# Patient Record
Sex: FEMALE | Race: WHITE | NOT HISPANIC OR LATINO | Employment: OTHER | URBAN - METROPOLITAN AREA
[De-identification: names, ages, dates, MRNs, and addresses within clinical notes are randomized per-mention and may not be internally consistent; named-entity substitution may affect disease eponyms.]

---

## 2017-03-28 ENCOUNTER — HOSPITAL ENCOUNTER (OUTPATIENT)
Dept: OTHER | Facility: HOSPITAL | Age: 62
Discharge: HOME | End: 2017-03-28
Attending: FAMILY MEDICINE | Admitting: FAMILY MEDICINE

## 2017-09-18 ENCOUNTER — HOSPITAL ENCOUNTER (OUTPATIENT)
Dept: OTHER | Facility: HOSPITAL | Age: 62
Discharge: HOME | End: 2017-09-18
Attending: FAMILY MEDICINE | Admitting: FAMILY MEDICINE

## 2017-10-09 ENCOUNTER — HOSPITAL ENCOUNTER (OUTPATIENT)
Dept: OTHER | Facility: HOSPITAL | Age: 62
Discharge: HOME | End: 2017-10-09
Attending: FAMILY MEDICINE | Admitting: FAMILY MEDICINE

## 2018-08-23 ENCOUNTER — HOSPITAL ENCOUNTER (OUTPATIENT)
Dept: GENERAL RADIOLOGY | Facility: HOSPITAL | Age: 63
Discharge: HOME | End: 2018-08-23
Attending: FAMILY MEDICINE | Admitting: FAMILY MEDICINE

## 2019-05-20 ENCOUNTER — HOSPITAL ENCOUNTER (OUTPATIENT)
Dept: OTHER | Facility: HOSPITAL | Age: 64
Discharge: HOME | End: 2019-05-20
Attending: FAMILY MEDICINE | Admitting: FAMILY MEDICINE

## 2019-05-20 ENCOUNTER — CONVERSION ENCOUNTER (OUTPATIENT)
Dept: FAMILY MEDICINE CLINIC | Facility: CLINIC | Age: 64
End: 2019-05-20

## 2019-06-12 VITALS
SYSTOLIC BLOOD PRESSURE: 138 MMHG | HEIGHT: 66 IN | OXYGEN SATURATION: 97 % | WEIGHT: 210 LBS | RESPIRATION RATE: 16 BRPM | HEART RATE: 90 BPM | DIASTOLIC BLOOD PRESSURE: 70 MMHG | BODY MASS INDEX: 33.75 KG/M2

## 2019-09-04 RX ORDER — ROSUVASTATIN CALCIUM 5 MG/1
5 TABLET, COATED ORAL DAILY
Qty: 90 TABLET | Refills: 4 | Status: SHIPPED | OUTPATIENT
Start: 2019-09-04 | End: 2020-12-31

## 2019-09-18 ENCOUNTER — TELEPHONE (OUTPATIENT)
Dept: FAMILY MEDICINE CLINIC | Facility: CLINIC | Age: 64
End: 2019-09-18

## 2019-09-18 DIAGNOSIS — Z12.31 SCREENING MAMMOGRAM, ENCOUNTER FOR: Primary | ICD-10-CM

## 2019-10-01 ENCOUNTER — TELEPHONE (OUTPATIENT)
Dept: FAMILY MEDICINE CLINIC | Facility: CLINIC | Age: 64
End: 2019-10-01

## 2019-10-02 ENCOUNTER — TELEPHONE (OUTPATIENT)
Dept: FAMILY MEDICINE CLINIC | Facility: CLINIC | Age: 64
End: 2019-10-02

## 2019-10-04 ENCOUNTER — HOSPITAL ENCOUNTER (OUTPATIENT)
Dept: MAMMOGRAPHY | Facility: HOSPITAL | Age: 64
Discharge: HOME | End: 2019-10-04
Admitting: FAMILY MEDICINE

## 2019-10-04 DIAGNOSIS — Z12.31 SCREENING MAMMOGRAM, ENCOUNTER FOR: ICD-10-CM

## 2019-10-04 PROCEDURE — 77067 SCR MAMMO BI INCL CAD: CPT

## 2019-10-04 PROCEDURE — 77063 BREAST TOMOSYNTHESIS BI: CPT

## 2019-10-14 ENCOUNTER — OFFICE VISIT (OUTPATIENT)
Dept: FAMILY MEDICINE CLINIC | Facility: CLINIC | Age: 64
End: 2019-10-14

## 2019-10-14 VITALS
HEART RATE: 62 BPM | OXYGEN SATURATION: 97 % | SYSTOLIC BLOOD PRESSURE: 136 MMHG | TEMPERATURE: 98.6 F | DIASTOLIC BLOOD PRESSURE: 80 MMHG | RESPIRATION RATE: 16 BRPM | HEIGHT: 65 IN | BODY MASS INDEX: 35.85 KG/M2 | WEIGHT: 215.2 LBS

## 2019-10-14 DIAGNOSIS — Z12.31 VISIT FOR SCREENING MAMMOGRAM: ICD-10-CM

## 2019-10-14 DIAGNOSIS — Z12.11 COLON CANCER SCREENING: ICD-10-CM

## 2019-10-14 DIAGNOSIS — E66.09 CLASS 2 OBESITY DUE TO EXCESS CALORIES WITHOUT SERIOUS COMORBIDITY WITH BODY MASS INDEX (BMI) OF 36.0 TO 36.9 IN ADULT: ICD-10-CM

## 2019-10-14 DIAGNOSIS — Z23 NEED FOR PNEUMOCOCCAL VACCINATION: ICD-10-CM

## 2019-10-14 DIAGNOSIS — Z00.01 ANNUAL VISIT FOR GENERAL ADULT MEDICAL EXAMINATION WITH ABNORMAL FINDINGS: Primary | ICD-10-CM

## 2019-10-14 DIAGNOSIS — E78.2 MIXED HYPERLIPIDEMIA: ICD-10-CM

## 2019-10-14 DIAGNOSIS — Z78.0 ASYMPTOMATIC MENOPAUSAL STATE: ICD-10-CM

## 2019-10-14 DIAGNOSIS — J30.1 CHRONIC ALLERGIC RHINITIS DUE TO POLLEN: ICD-10-CM

## 2019-10-14 DIAGNOSIS — Z23 NEED FOR INFLUENZA VACCINATION: ICD-10-CM

## 2019-10-14 DIAGNOSIS — Z12.4 SCREENING FOR MALIGNANT NEOPLASM OF CERVIX: ICD-10-CM

## 2019-10-14 DIAGNOSIS — Z11.59 NEED FOR HEPATITIS C SCREENING TEST: ICD-10-CM

## 2019-10-14 PROBLEM — E66.3 OVERWEIGHT: Status: ACTIVE | Noted: 2019-10-14

## 2019-10-14 PROBLEM — E66.812 CLASS 2 OBESITY DUE TO EXCESS CALORIES WITHOUT SERIOUS COMORBIDITY IN ADULT: Status: ACTIVE | Noted: 2019-10-14

## 2019-10-14 PROBLEM — N13.30 HYDRONEPHROSIS OF LEFT KIDNEY: Status: ACTIVE | Noted: 2019-10-14

## 2019-10-14 PROBLEM — E78.5 HYPERLIPIDEMIA: Status: ACTIVE | Noted: 2019-10-14

## 2019-10-14 PROBLEM — N13.30 HYDRONEPHROSIS OF LEFT KIDNEY: Status: RESOLVED | Noted: 2019-10-14 | Resolved: 2019-10-14

## 2019-10-14 PROBLEM — Z85.3 HISTORY OF MALIGNANT NEOPLASM OF FEMALE BREAST: Status: ACTIVE | Noted: 2019-10-14

## 2019-10-14 LAB
BILIRUB BLD-MCNC: NEGATIVE MG/DL
CLARITY, POC: CLEAR
COLOR UR: YELLOW
GLUCOSE UR STRIP-MCNC: NEGATIVE MG/DL
KETONES UR QL: NEGATIVE
LEUKOCYTE EST, POC: ABNORMAL
NITRITE UR-MCNC: NEGATIVE MG/ML
PH UR: 6 [PH] (ref 5–8)
PROT UR STRIP-MCNC: NEGATIVE MG/DL
RBC # UR STRIP: ABNORMAL /UL
SP GR UR: 1 (ref 1–1.03)
UROBILINOGEN UR QL: NORMAL

## 2019-10-14 PROCEDURE — 90674 CCIIV4 VAC NO PRSV 0.5 ML IM: CPT | Performed by: FAMILY MEDICINE

## 2019-10-14 PROCEDURE — 81002 URINALYSIS NONAUTO W/O SCOPE: CPT | Performed by: FAMILY MEDICINE

## 2019-10-14 PROCEDURE — 99397 PER PM REEVAL EST PAT 65+ YR: CPT | Performed by: FAMILY MEDICINE

## 2019-10-14 PROCEDURE — 90471 IMMUNIZATION ADMIN: CPT | Performed by: FAMILY MEDICINE

## 2019-10-14 RX ORDER — CETIRIZINE HYDROCHLORIDE 10 MG/1
1 TABLET ORAL DAILY
COMMUNITY
Start: 2019-05-20 | End: 2020-06-05

## 2019-10-14 RX ORDER — EPINEPHRINE 0.3 MG/.3ML
0.3 INJECTION SUBCUTANEOUS AS NEEDED
Refills: 3 | COMMUNITY
Start: 2019-09-19

## 2019-10-14 RX ORDER — FLUTICASONE PROPIONATE 50 MCG
2 SPRAY, SUSPENSION (ML) NASAL DAILY
COMMUNITY
Start: 2019-05-20 | End: 2019-10-14

## 2019-10-14 RX ORDER — ALBUTEROL SULFATE 90 UG/1
2 AEROSOL, METERED RESPIRATORY (INHALATION)
COMMUNITY
Start: 2019-05-20 | End: 2019-12-16

## 2019-10-14 RX ORDER — BUDESONIDE AND FORMOTEROL FUMARATE DIHYDRATE 160; 4.5 UG/1; UG/1
2 AEROSOL RESPIRATORY (INHALATION) 2 TIMES DAILY
Refills: 11 | COMMUNITY
Start: 2019-09-19 | End: 2020-12-22

## 2019-10-14 RX ORDER — MONTELUKAST SODIUM 10 MG/1
1 TABLET ORAL DAILY
COMMUNITY
Start: 2019-05-20 | End: 2020-05-26

## 2019-10-14 NOTE — PROGRESS NOTES
Subjective   Montserrat Cueva is a 64 y.o. female.     Chief Complaint   Patient presents with   • Annual Exam       The patient is here: for coordination of medical care to discuss health maintenance and disease prevention to follow up on multiple medical problems. Overall has: moderate activity with work/home activities, good appetite, feels well with minor complaints, good energy level and is sleeping well. Nutrition: balanced diet. Last tetanus shot was 2012.     Hyperlipidemia   This is a chronic problem. The current episode started more than 1 year ago. The problem is uncontrolled. She has no history of diabetes. Pertinent negatives include no chest pain, leg pain, myalgias or shortness of breath. Current antihyperlipidemic treatment includes statins. The current treatment provides significant improvement of lipids. Risk factors for coronary artery disease include dyslipidemia, obesity and post-menopausal.        I personally reviewed and updated the patient's allergies, medications, problem list, and past medical, surgical, social, and family history.     Allergies:  No Known Allergies    Social History:  Social History     Socioeconomic History   • Marital status:      Spouse name: Not on file   • Number of children: Not on file   • Years of education: Not on file   • Highest education level: Not on file   Tobacco Use   • Smoking status: Never Smoker   • Smokeless tobacco: Never Used   Substance and Sexual Activity   • Alcohol use: No     Frequency: Never   • Drug use: No   • Sexual activity: Defer       Family History:  Family History   Problem Relation Age of Onset   • Heart disease Father    • Stroke Father    • Heart disease Brother    • Kidney disease Brother    • Leukemia Son        Past Medical History :  Patient Active Problem List   Diagnosis   • Chronic allergic rhinitis due to pollen   • History of malignant neoplasm of female breast   • Hyperlipidemia   • Asymptomatic menopausal state   •  Class 2 obesity due to excess calories without serious comorbidity in adult   • Screening for malignant neoplasm of cervix   • Annual visit for general adult medical examination with abnormal findings   • Visit for screening mammogram   • Colon cancer screening       Medication List:    Current Outpatient Medications:   •  albuterol sulfate HFA (PROVENTIL HFA) 108 (90 Base) MCG/ACT inhaler, Inhale 2 puffs Every 4 (Four) Hours., Disp: , Rfl:   •  aspirin 81 MG tablet, Take 1 tablet by mouth Daily., Disp: , Rfl:   •  cetirizine (ZYRTEC ALLERGY) 10 MG tablet, Take 1 tablet by mouth Daily., Disp: , Rfl:   •  EPINEPHrine (EPIPEN) 0.3 MG/0.3ML solution auto-injector injection, Inject 0.3 mL into the appropriate muscle as directed by prescriber As Needed for Allergies., Disp: , Rfl: 3  •  montelukast (SINGULAIR) 10 MG tablet, Take 1 tablet by mouth Daily., Disp: , Rfl:   •  rosuvastatin (CRESTOR) 5 MG tablet, Take 1 tablet by mouth Daily., Disp: 90 tablet, Rfl: 4  •  SYMBICORT 160-4.5 MCG/ACT inhaler, Inhale 2 puffs 2 (Two) Times a Day., Disp: , Rfl: 11    Past Surgical History:  Past Surgical History:   Procedure Laterality Date   • MASTECTOMY MODIFIED RADICAL / SIMPLE / COMPLETE Right    • TOTAL ABDOMINAL HYSTERECTOMY      Comments: Ovaries Remain       Depression Screen:   PHQ-2/PHQ-9 Depression Screening 10/14/2019   Little interest or pleasure in doing things 0   Feeling down, depressed, or hopeless 0   Total Score 0       Fall Risk Screen:  ANTONY Fall Risk Assessment was completed, and patient is at LOW risk for falls.Assessment completed on:10/14/2019    Review Of Systems:  Review of Systems   Constitutional: Negative for activity change, appetite change, fatigue, fever, unexpected weight gain and unexpected weight loss.   HENT: Negative for congestion, ear pain, hearing loss, rhinorrhea, sinus pressure, sore throat, swollen glands, trouble swallowing and voice change.    Eyes: Negative for visual disturbance.  "  Respiratory: Negative for apnea, cough, shortness of breath and wheezing.    Cardiovascular: Negative for chest pain and palpitations.   Gastrointestinal: Negative for abdominal pain, blood in stool, constipation, diarrhea, nausea, vomiting and indigestion.   Endocrine: Negative for cold intolerance, heat intolerance, polydipsia and polyuria.   Genitourinary: Negative for breast discharge, breast lump, breast pain, dysuria, flank pain, frequency, pelvic pain and vaginal bleeding.   Musculoskeletal: Negative for arthralgias, joint swelling and myalgias.   Skin: Negative for rash, skin lesions and bruise.   Allergic/Immunologic: Negative for environmental allergies and immunocompromised state.   Neurological: Negative for dizziness, syncope, weakness, numbness, headache and memory problem.   Hematological: Negative for adenopathy. Does not bruise/bleed easily.   Psychiatric/Behavioral: Negative for suicidal ideas and depressed mood. The patient is not nervous/anxious.        Vital Signs:  Visit Vitals  /80 (BP Location: Left arm, Patient Position: Sitting, Cuff Size: Adult)   Pulse 62   Temp 98.6 °F (37 °C) (Oral)   Resp 16   Ht 165.1 cm (65\")   Wt 97.6 kg (215 lb 3.2 oz)   LMP 01/01/1985   SpO2 97% Comment: Room air   BMI 35.81 kg/m²       Physical Exam:  Physical Exam   Constitutional: She is oriented to person, place, and time. She appears well-developed and well-nourished. No distress.   HENT:   Head: Normocephalic. Hair is normal.   Right Ear: Tympanic membrane and external ear normal.   Left Ear: Tympanic membrane and external ear normal.   Nose: Nose normal. No mucosal edema.   Mouth/Throat: Uvula is midline, oropharynx is clear and moist and mucous membranes are normal.   Eyes: Conjunctivae and EOM are normal. Pupils are equal, round, and reactive to light. Right eye exhibits no discharge. Left eye exhibits no discharge.   Neck: Normal range of motion. Neck supple. No JVD present. No muscular " tenderness present. No thyromegaly present.   Cardiovascular: Normal rate, regular rhythm and normal heart sounds. PMI is not displaced. Exam reveals no gallop and no friction rub.   No murmur heard.  Pulses:       Carotid pulses are 2+ on the right side, and 2+ on the left side.       Femoral pulses are 2+ on the right side, and 2+ on the left side.       Dorsalis pedis pulses are 2+ on the right side, and 2+ on the left side.   Pulmonary/Chest: Effort normal and breath sounds normal. No respiratory distress. She has no decreased breath sounds. She has no wheezes. She has no rhonchi. She has no rales. Left breast exhibits no inverted nipple, no mass, no nipple discharge, no skin change and no tenderness (Her right breast is absent ).   Abdominal: Soft. Bowel sounds are normal. She exhibits no distension, no abdominal bruit and no mass. There is no hepatosplenomegaly. There is no CVA tenderness. Hernia confirmed negative in the right inguinal area and confirmed negative in the left inguinal area.   Musculoskeletal: Normal range of motion. She exhibits no edema, tenderness or deformity.   Lymphadenopathy:     She has no cervical adenopathy.        Right: No inguinal and no supraclavicular adenopathy present.        Left: No inguinal and no supraclavicular adenopathy present.   Neurological: She is alert and oriented to person, place, and time. She has normal strength and normal reflexes. No cranial nerve deficit or sensory deficit. She exhibits normal muscle tone. Coordination normal.   Skin: No ecchymosis, no lesion and no rash noted. No erythema.   Psychiatric: She has a normal mood and affect. Her speech is normal and behavior is normal. Judgment and thought content normal. Cognition and memory are normal. She does not express impulsivity. She expresses no suicidal ideation. She exhibits normal recent memory and normal remote memory.         Assessment and Plan:  Problem List Items Addressed This Visit         Medium    Hyperlipidemia    Overview     Stable on meds.         Current Assessment & Plan     Lipid abnormalities are improving with treatment.  Pharmacotherapy as ordered.  Lipids will be reassessed in 1 year.         Relevant Orders    CBC & Differential (Completed)    Comprehensive Metabolic Panel (Completed)    Lipid Panel With / Chol / HDL Ratio (Completed)    TSH (Completed)       Low    Chronic allergic rhinitis due to pollen    Overview     She has had exacerbation of sx s/p testing, she is soon to begin immunoRx         Class 2 obesity due to excess calories without serious comorbidity in adult    Overview     Diet and exercise encouraged and discussed. .  She has rejoined wt watchers.             Unprioritized    Asymptomatic menopausal state    Overview     declines  dexa scan 02/12/2014. WNL Repeat 2019         Screening for malignant neoplasm of cervix    Overview     S/P [id number] BSO for fibroids.  Pap smear 01/04/2013. WNL         Annual visit for general adult medical examination with abnormal findings - Primary    Relevant Orders    POCT urinalysis dipstick, manual (Completed)    Visit for screening mammogram    Overview     Last mammogram left breast 10/04/2019. normal         Colon cancer screening    Overview     Cologuard 05/16/2016- negative. Repeat 2019           Other Visit Diagnoses     Need for influenza vaccination        Relevant Orders    Flucelvax Quad=>4Years (PFS) (Completed)    Need for pneumococcal vaccination        Relevant Medications    pneumococcal conj. 13-valent (PREVNAR-13) vaccine 0.5 mL (Completed)    Need for hepatitis C screening test        Relevant Orders    Hepatitis C Antibody (Completed)          An After Visit Summary and PPPS were given to the patient.

## 2019-10-15 LAB
ALBUMIN SERPL-MCNC: 4.5 G/DL (ref 3.6–4.8)
ALBUMIN/GLOB SERPL: 1.5 {RATIO} (ref 1.2–2.2)
ALP SERPL-CCNC: 73 IU/L (ref 39–117)
ALT SERPL-CCNC: 21 IU/L (ref 0–32)
AST SERPL-CCNC: 23 IU/L (ref 0–40)
BASOPHILS # BLD AUTO: 0 X10E3/UL (ref 0–0.2)
BASOPHILS NFR BLD AUTO: 1 %
BILIRUB SERPL-MCNC: 0.4 MG/DL (ref 0–1.2)
BUN SERPL-MCNC: 13 MG/DL (ref 8–27)
BUN/CREAT SERPL: 21 (ref 12–28)
CALCIUM SERPL-MCNC: 9.4 MG/DL (ref 8.7–10.3)
CHLORIDE SERPL-SCNC: 102 MMOL/L (ref 96–106)
CHOLEST SERPL-MCNC: 164 MG/DL (ref 100–199)
CHOLEST/HDLC SERPL: 3 RATIO (ref 0–4.4)
CO2 SERPL-SCNC: 26 MMOL/L (ref 20–29)
CREAT SERPL-MCNC: 0.63 MG/DL (ref 0.57–1)
EOSINOPHIL # BLD AUTO: 0.3 X10E3/UL (ref 0–0.4)
EOSINOPHIL NFR BLD AUTO: 5 %
ERYTHROCYTE [DISTWIDTH] IN BLOOD BY AUTOMATED COUNT: 15 % (ref 12.3–15.4)
GLOBULIN SER CALC-MCNC: 3.1 G/DL (ref 1.5–4.5)
GLUCOSE SERPL-MCNC: 83 MG/DL (ref 65–99)
HCT VFR BLD AUTO: 41 % (ref 34–46.6)
HCV AB S/CO SERPL IA: <0.1 S/CO RATIO (ref 0–0.9)
HDLC SERPL-MCNC: 54 MG/DL
HGB BLD-MCNC: 13.7 G/DL (ref 11.1–15.9)
IMM GRANULOCYTES # BLD AUTO: 0 X10E3/UL (ref 0–0.1)
IMM GRANULOCYTES NFR BLD AUTO: 0 %
LDLC SERPL CALC-MCNC: 95 MG/DL (ref 0–99)
LYMPHOCYTES # BLD AUTO: 2.2 X10E3/UL (ref 0.7–3.1)
LYMPHOCYTES NFR BLD AUTO: 34 %
MCH RBC QN AUTO: 29.3 PG (ref 26.6–33)
MCHC RBC AUTO-ENTMCNC: 33.4 G/DL (ref 31.5–35.7)
MCV RBC AUTO: 88 FL (ref 79–97)
MONOCYTES # BLD AUTO: 0.6 X10E3/UL (ref 0.1–0.9)
MONOCYTES NFR BLD AUTO: 10 %
NEUTROPHILS # BLD AUTO: 3.3 X10E3/UL (ref 1.4–7)
NEUTROPHILS NFR BLD AUTO: 50 %
PLATELET # BLD AUTO: 329 X10E3/UL (ref 150–450)
POTASSIUM SERPL-SCNC: 4.3 MMOL/L (ref 3.5–5.2)
PROT SERPL-MCNC: 7.6 G/DL (ref 6–8.5)
RBC # BLD AUTO: 4.67 X10E6/UL (ref 3.77–5.28)
SODIUM SERPL-SCNC: 143 MMOL/L (ref 134–144)
TRIGL SERPL-MCNC: 75 MG/DL (ref 0–149)
TSH SERPL DL<=0.005 MIU/L-ACNC: 0.45 UIU/ML (ref 0.45–4.5)
VLDLC SERPL CALC-MCNC: 15 MG/DL (ref 5–40)
WBC # BLD AUTO: 6.4 X10E3/UL (ref 3.4–10.8)

## 2019-12-06 ENCOUNTER — OFFICE VISIT (OUTPATIENT)
Dept: FAMILY MEDICINE CLINIC | Facility: CLINIC | Age: 64
End: 2019-12-06

## 2019-12-06 VITALS
WEIGHT: 201.2 LBS | HEART RATE: 82 BPM | OXYGEN SATURATION: 99 % | RESPIRATION RATE: 16 BRPM | HEIGHT: 65 IN | TEMPERATURE: 98.6 F | SYSTOLIC BLOOD PRESSURE: 140 MMHG | BODY MASS INDEX: 33.52 KG/M2 | DIASTOLIC BLOOD PRESSURE: 82 MMHG

## 2019-12-06 DIAGNOSIS — E66.3 OVERWEIGHT: ICD-10-CM

## 2019-12-06 DIAGNOSIS — H92.11 DISCHARGE OF RIGHT EAR PRESENT: Primary | ICD-10-CM

## 2019-12-06 DIAGNOSIS — B96.89 ACUTE BACTERIAL SINUSITIS: ICD-10-CM

## 2019-12-06 DIAGNOSIS — J01.90 ACUTE BACTERIAL SINUSITIS: ICD-10-CM

## 2019-12-06 DIAGNOSIS — H72.91 PERFORATION OF RIGHT TYMPANIC MEMBRANE: ICD-10-CM

## 2019-12-06 PROCEDURE — 99213 OFFICE O/P EST LOW 20 MIN: CPT | Performed by: FAMILY MEDICINE

## 2019-12-06 RX ORDER — CEPHALEXIN 500 MG/1
500 CAPSULE ORAL 3 TIMES DAILY
Qty: 30 CAPSULE | Refills: 0 | Status: SHIPPED | OUTPATIENT
Start: 2019-12-06 | End: 2019-12-16 | Stop reason: SINTOL

## 2019-12-06 RX ORDER — OFLOXACIN 3 MG/ML
5 SOLUTION AURICULAR (OTIC) DAILY
Qty: 10 ML | Refills: 0 | Status: SHIPPED | OUTPATIENT
Start: 2019-12-06 | End: 2020-02-11

## 2019-12-06 NOTE — PROGRESS NOTES
Subjective   Montserrat Cueva is a 64 y.o. female.     Chief Complaint   Patient presents with   • Ear Drainage       Ear Drainage    There is pain in the right ear. This is a new problem. The current episode started today. The problem occurs constantly. The problem has been unchanged. There has been no fever. Associated symptoms include ear discharge. Pertinent negatives include no abdominal pain, coughing, headaches, neck pain or sore throat.            I personally reviewed and updated the patient's allergies, medications, problem list, and past medical, surgical, social, and family history.     Family History   Problem Relation Age of Onset   • Heart disease Father    • Stroke Father    • Heart disease Brother    • Kidney disease Brother    • Leukemia Son        Social History     Tobacco Use   • Smoking status: Never Smoker   • Smokeless tobacco: Never Used   Substance Use Topics   • Alcohol use: No     Frequency: Never   • Drug use: No       Past Surgical History:   Procedure Laterality Date   • MASTECTOMY MODIFIED RADICAL / SIMPLE / COMPLETE Right    • TOTAL ABDOMINAL HYSTERECTOMY      Comments: Ovaries Remain       Patient Active Problem List   Diagnosis   • Chronic allergic rhinitis due to pollen   • History of malignant neoplasm of female breast   • Hyperlipidemia   • Asymptomatic menopausal state   • Class 2 obesity due to excess calories without serious comorbidity in adult   • Screening for malignant neoplasm of cervix   • Annual visit for general adult medical examination with abnormal findings   • Visit for screening mammogram   • Colon cancer screening   • Discharge of right ear present   • Perforation of right tympanic membrane         Current Outpatient Medications:   •  aspirin 81 MG tablet, Take 1 tablet by mouth Daily., Disp: , Rfl:   •  cetirizine (ZYRTEC ALLERGY) 10 MG tablet, Take 1 tablet by mouth Daily., Disp: , Rfl:   •  EPINEPHrine (EPIPEN) 0.3 MG/0.3ML solution auto-injector injection,  "Inject 0.3 mL into the appropriate muscle as directed by prescriber As Needed for Allergies., Disp: , Rfl: 3  •  montelukast (SINGULAIR) 10 MG tablet, Take 1 tablet by mouth Daily., Disp: , Rfl:   •  rosuvastatin (CRESTOR) 5 MG tablet, Take 1 tablet by mouth Daily., Disp: 90 tablet, Rfl: 4  •  SYMBICORT 160-4.5 MCG/ACT inhaler, Inhale 2 puffs 2 (Two) Times a Day., Disp: , Rfl: 11  •  ofloxacin (FLOXIN OTIC) 0.3 % otic solution, Administer 5 drops to the right ear Daily., Disp: 10 mL, Rfl: 0         Review of Systems   Constitutional: Negative for chills and diaphoresis.   HENT: Positive for ear discharge. Negative for sore throat.    Eyes: Negative for visual disturbance.   Respiratory: Negative for cough and shortness of breath.    Cardiovascular: Negative for chest pain and palpitations.   Gastrointestinal: Negative for abdominal pain and nausea.   Endocrine: Negative for polydipsia and polyphagia.   Musculoskeletal: Negative for neck pain and neck stiffness.   Skin: Negative for color change and pallor.   Neurological: Negative for seizures and syncope.   Hematological: Negative for adenopathy.       Objective   /82 (BP Location: Left arm, Patient Position: Sitting, Cuff Size: Adult)   Pulse 82   Temp 98.6 °F (37 °C)   Resp 16   Ht 165.1 cm (65\")   Wt 91.3 kg (201 lb 3.2 oz)   LMP 01/01/1985   SpO2 99%   Breastfeeding No   BMI 33.48 kg/m²   Wt Readings from Last 3 Encounters:   12/16/19 89.7 kg (197 lb 12.8 oz)   12/06/19 91.3 kg (201 lb 3.2 oz)   10/14/19 97.6 kg (215 lb 3.2 oz)     Physical Exam   Constitutional: She is oriented to person, place, and time. She appears well-developed and well-nourished.   HENT:   Head: Normocephalic.   Right Ear: Hearing, external ear and ear canal normal. Tympanic membrane is erythematous. A middle ear effusion is present.   Left Ear: Hearing, external ear and ear canal normal. Tympanic membrane is erythematous. A middle ear effusion is present.   Nose: " Congestion present. Right sinus exhibits maxillary sinus tenderness and frontal sinus tenderness. Left sinus exhibits maxillary sinus tenderness and frontal sinus tenderness.   Mouth/Throat: Posterior oropharyngeal erythema present. No tonsillar abscesses. Tonsils are 1+ on the right. Tonsils are 1+ on the left.   Eyes: Pupils are equal, round, and reactive to light. Conjunctivae, EOM and lids are normal.   Neck: No Brudzinski's sign and no Kernig's sign noted.   Cardiovascular: Normal rate, regular rhythm, S1 normal, S2 normal, normal heart sounds and normal pulses. Exam reveals no gallop and no friction rub.   No murmur heard.  Pulmonary/Chest: Effort normal. No accessory muscle usage or stridor. No respiratory distress. She has no decreased breath sounds. She has wheezes in the right upper field, the right middle field, the right lower field, the left upper field, the left middle field and the left lower field. She has rhonchi in the right upper field, the right middle field, the right lower field, the left upper field, the left middle field and the left lower field. She has no rales.   Abdominal: Soft. Normal appearance and bowel sounds are normal. She exhibits no distension and no mass. There is no tenderness. No hernia.   Neurological: She is alert and oriented to person, place, and time. No cranial nerve deficit. Coordination and gait normal.   Skin: Skin is warm and dry. Turgor is normal. She is not diaphoretic. No pallor.         Assessment/Plan         Acute bacterial sinusitis.  Start antibiotics.  Continue antihistamine.  Follow-up recheck  TM perforation.  Small/peripheral.  Start eardrops.  Follow-up recheck.  Call or return if worsening symptoms  Problem List Items Addressed This Visit        Respiratory    RESOLVED: Acute bacterial sinusitis       Nervous and Auditory    Perforation of right tympanic membrane       Other    Discharge of right ear present - Primary      Other Visit Diagnoses      Overweight                  Expected course, medications, and adverse effects discussed.  Call or return if worsening or persistent symptoms.

## 2019-12-16 ENCOUNTER — OFFICE VISIT (OUTPATIENT)
Dept: FAMILY MEDICINE CLINIC | Facility: CLINIC | Age: 64
End: 2019-12-16

## 2019-12-16 VITALS
HEIGHT: 65 IN | SYSTOLIC BLOOD PRESSURE: 130 MMHG | BODY MASS INDEX: 32.96 KG/M2 | TEMPERATURE: 98.1 F | RESPIRATION RATE: 18 BRPM | WEIGHT: 197.8 LBS | DIASTOLIC BLOOD PRESSURE: 80 MMHG | HEART RATE: 72 BPM | OXYGEN SATURATION: 96 %

## 2019-12-16 DIAGNOSIS — E78.2 MIXED HYPERLIPIDEMIA: ICD-10-CM

## 2019-12-16 DIAGNOSIS — H72.91 PERFORATION OF RIGHT TYMPANIC MEMBRANE: ICD-10-CM

## 2019-12-16 DIAGNOSIS — J30.1 CHRONIC ALLERGIC RHINITIS DUE TO POLLEN: ICD-10-CM

## 2019-12-16 DIAGNOSIS — R19.7 DIARRHEA, UNSPECIFIED TYPE: Primary | ICD-10-CM

## 2019-12-16 DIAGNOSIS — E66.09 CLASS 1 OBESITY DUE TO EXCESS CALORIES WITHOUT SERIOUS COMORBIDITY WITH BODY MASS INDEX (BMI) OF 32.0 TO 32.9 IN ADULT: ICD-10-CM

## 2019-12-16 PROBLEM — B96.89 ACUTE BACTERIAL SINUSITIS: Status: RESOLVED | Noted: 2019-12-06 | Resolved: 2019-12-16

## 2019-12-16 PROBLEM — J01.90 ACUTE BACTERIAL SINUSITIS: Status: RESOLVED | Noted: 2019-12-06 | Resolved: 2019-12-16

## 2019-12-16 PROCEDURE — 99214 OFFICE O/P EST MOD 30 MIN: CPT | Performed by: FAMILY MEDICINE

## 2019-12-16 NOTE — PROGRESS NOTES
Subjective   Montserrat Cueva is a 64 y.o. female.     Chief Complaint   Patient presents with   • Diarrhea       Diarrhea    This is a new problem. The current episode started in the past 7 days (Montserrat states the diarrhea started Friday evening. She was prescribed Keflex 500mg 3 times a day x 10 days on 12/6/2019). The problem occurs more than 10 times per day. The problem has been gradually worsening. The stool consistency is described as watery. The patient states that diarrhea awakens her from sleep. Pertinent negatives include no abdominal pain, arthralgias, bloating, coughing, fever, headaches, increased  flatus, myalgias, URI, vomiting or weight loss. Nothing aggravates the symptoms. There are no known risk factors. She has tried increased fluids (imodium) for the symptoms. The treatment provided mild relief.   Hyperlipidemia   This is a chronic problem. The current episode started more than 1 year ago. Recent lipid tests were reviewed and are normal. Exacerbating diseases include obesity. Pertinent negatives include no chest pain, leg pain, myalgias or shortness of breath. Current antihyperlipidemic treatment includes statins. Risk factors for coronary artery disease include obesity, dyslipidemia and post-menopausal.        The following portions of the patient's history were reviewed and updated as appropriate: allergies, current medications, past family history, past medical history, past social history, past surgical history and problem list.    Allergies:  No Known Allergies    Social History:  Social History     Socioeconomic History   • Marital status:      Spouse name: Not on file   • Number of children: Not on file   • Years of education: Not on file   • Highest education level: Not on file   Tobacco Use   • Smoking status: Never Smoker   • Smokeless tobacco: Never Used   Substance and Sexual Activity   • Alcohol use: No     Frequency: Never   • Drug use: No   • Sexual activity: Defer        Family History:  Family History   Problem Relation Age of Onset   • Heart disease Father    • Stroke Father    • Heart disease Brother    • Kidney disease Brother    • Leukemia Son        Past Medical History :  Patient Active Problem List   Diagnosis   • Chronic allergic rhinitis due to pollen   • History of malignant neoplasm of female breast   • Hyperlipidemia   • Asymptomatic menopausal state   • Class 2 obesity due to excess calories without serious comorbidity in adult   • Screening for malignant neoplasm of cervix   • Annual visit for general adult medical examination with abnormal findings   • Visit for screening mammogram   • Colon cancer screening   • Discharge of right ear present   • Perforation of right tympanic membrane       Medication List:    Current Outpatient Medications:   •  aspirin 81 MG tablet, Take 1 tablet by mouth Daily., Disp: , Rfl:   •  cetirizine (ZYRTEC ALLERGY) 10 MG tablet, Take 1 tablet by mouth Daily., Disp: , Rfl:   •  EPINEPHrine (EPIPEN) 0.3 MG/0.3ML solution auto-injector injection, Inject 0.3 mL into the appropriate muscle as directed by prescriber As Needed for Allergies., Disp: , Rfl: 3  •  montelukast (SINGULAIR) 10 MG tablet, Take 1 tablet by mouth Daily., Disp: , Rfl:   •  ofloxacin (FLOXIN OTIC) 0.3 % otic solution, Administer 5 drops to the right ear Daily., Disp: 10 mL, Rfl: 0  •  rosuvastatin (CRESTOR) 5 MG tablet, Take 1 tablet by mouth Daily., Disp: 90 tablet, Rfl: 4  •  SYMBICORT 160-4.5 MCG/ACT inhaler, Inhale 2 puffs 2 (Two) Times a Day., Disp: , Rfl: 11    Past Surgical History:  Past Surgical History:   Procedure Laterality Date   • MASTECTOMY MODIFIED RADICAL / SIMPLE / COMPLETE Right    • TOTAL ABDOMINAL HYSTERECTOMY      Comments: Ovaries Remain       Review of Systems:  Review of Systems   Constitutional: Negative for appetite change, fatigue, fever and unexpected weight loss.   HENT: Positive for congestion (moderate sxs of 5 days duration and  "worsening. ), postnasal drip, sinus pressure and tinnitus. Negative for ear pain ( she was seen last week with suspected TM ruputre, she is much improved  her upper respiratory sxs continue) and sore throat.    Eyes: Negative for visual disturbance.   Respiratory: Negative for cough, shortness of breath and wheezing.    Cardiovascular: Negative for chest pain, palpitations and leg swelling.   Gastrointestinal: Positive for diarrhea. Negative for abdominal pain, bloating, constipation, flatus, nausea and vomiting.   Endocrine: Negative.  Negative for cold intolerance, heat intolerance, polydipsia and polyuria.   Genitourinary: Negative for dysuria, frequency and hematuria.   Musculoskeletal: Negative for arthralgias and myalgias.   Skin: Negative for rash and bruise.   Allergic/Immunologic: Negative for environmental allergies (She is  presently on allergies. ).   Neurological: Negative for dizziness, syncope, weakness and headache.   Hematological: Negative for adenopathy. Does not bruise/bleed easily.   Psychiatric/Behavioral: Negative for depressed mood.       Physical Exam:  Vital Signs:  Visit Vitals  /80 (BP Location: Left arm, Patient Position: Sitting, Cuff Size: Adult)   Pulse 72   Temp 98.1 °F (36.7 °C)   Resp 18   Ht 165.1 cm (65\")   Wt 89.7 kg (197 lb 12.8 oz)   LMP 01/01/1985   SpO2 96% Comment: room air   BMI 32.92 kg/m²       Physical Exam   Constitutional: She is oriented to person, place, and time. She appears well-developed and well-nourished. No distress.   HENT:   Right Ear: Tympanic membrane and external ear normal.   Left Ear: Tympanic membrane and external ear normal.   Mouth/Throat: Oropharynx is clear and moist.   Eyes: Conjunctivae are normal.   Neck: Neck supple. No JVD present. No thyromegaly present.   Cardiovascular: Normal rate, regular rhythm, normal heart sounds and intact distal pulses. Exam reveals no gallop and no friction rub.   No murmur heard.  Pulmonary/Chest: Effort " normal and breath sounds normal. No respiratory distress. She has no wheezes. She has no rales.   Musculoskeletal: She exhibits no edema.   Lymphadenopathy:     She has no cervical adenopathy.   Neurological: She is alert and oriented to person, place, and time. Coordination normal.   Skin: Skin is warm. No rash noted. No erythema.   Psychiatric: She has a normal mood and affect.   Vitals reviewed.      Assessment and Plan:  Problem List Items Addressed This Visit        Medium    Hyperlipidemia    Overview     Stable on meds. Compliant         Current Assessment & Plan     Lipid abnormalities are improving with treatment.  Pharmacotherapy as ordered.  Lipids will be reassessed in 6 months.            Low    Chronic allergic rhinitis due to pollen    Overview     She is on immunRx and improved.   Mild exacerbation, resume meds and follow         Class 2 obesity due to excess calories without serious comorbidity in adult    Overview     Diet and exercise encouraged and discussed. .  She has rejoined wt watchers. She is congratulated on successful wt loss            Unprioritized    Perforation of right tympanic membrane      Other Visit Diagnoses     Diarrhea, unspecified type    -  Primary    negative cologuard 10/2019, sxs are improved and began after antibotic Rx cephalexin. Probiotic an follow.           An After Visit Summary and PPPS were given to the patient.

## 2020-01-23 ENCOUNTER — TELEPHONE (OUTPATIENT)
Dept: FAMILY MEDICINE CLINIC | Facility: CLINIC | Age: 65
End: 2020-01-23

## 2020-01-31 RX ORDER — AMOXICILLIN 500 MG/1
CAPSULE ORAL
Qty: 60 CAPSULE | Refills: 0 | OUTPATIENT
Start: 2020-01-31

## 2020-02-11 ENCOUNTER — OFFICE VISIT (OUTPATIENT)
Dept: FAMILY MEDICINE CLINIC | Facility: CLINIC | Age: 65
End: 2020-02-11

## 2020-02-11 VITALS
BODY MASS INDEX: 32.19 KG/M2 | RESPIRATION RATE: 18 BRPM | SYSTOLIC BLOOD PRESSURE: 138 MMHG | TEMPERATURE: 98.3 F | DIASTOLIC BLOOD PRESSURE: 70 MMHG | OXYGEN SATURATION: 99 % | HEIGHT: 65 IN | WEIGHT: 193.2 LBS | HEART RATE: 72 BPM

## 2020-02-11 DIAGNOSIS — L29.9 PRURITUS: ICD-10-CM

## 2020-02-11 DIAGNOSIS — L72.0 INCLUSION CYST: Primary | ICD-10-CM

## 2020-02-11 PROCEDURE — 11402 EXC TR-EXT B9+MARG 1.1-2 CM: CPT | Performed by: FAMILY MEDICINE

## 2020-02-11 NOTE — PROGRESS NOTES
Subjective   Montserrat Cueva is a 64 y.o. female.     Chief Complaint   Patient presents with   • Skin Lesion       Lesion:   Montserrat Cueva is here today for a lesion. The lesion appeared gradual and has been occurring for years. The course has been increasing in size. The lesion is characterized as red, swollen, evidence of infection. The lesion is located over back. The symptoms have been associated with recent enlargement, recurrent physical trama due to location, risk of rupture, pain, inflammation and infection.        The following portions of the patient's history were reviewed and updated as appropriate: allergies, current medications, past family history, past medical history, past social history, past surgical history and problem list.    Allergies:  No Known Allergies    Social History:  Social History     Socioeconomic History   • Marital status:      Spouse name: Not on file   • Number of children: Not on file   • Years of education: Not on file   • Highest education level: Not on file   Tobacco Use   • Smoking status: Never Smoker   • Smokeless tobacco: Never Used   Substance and Sexual Activity   • Alcohol use: No     Frequency: Never   • Drug use: No   • Sexual activity: Defer       Family History:  Family History   Problem Relation Age of Onset   • Heart disease Father    • Stroke Father    • Heart disease Brother    • Kidney disease Brother    • Leukemia Son        Past Medical History :  Patient Active Problem List   Diagnosis   • Chronic allergic rhinitis due to pollen   • History of malignant neoplasm of female breast   • Hyperlipidemia   • Asymptomatic menopausal state   • Class 2 obesity due to excess calories without serious comorbidity in adult   • Screening for malignant neoplasm of cervix   • Annual visit for general adult medical examination with abnormal findings   • Visit for screening mammogram   • Colon cancer screening   • Discharge of right ear present   • Perforation of  "right tympanic membrane       Medication List:  Outpatient Encounter Medications as of 2/11/2020   Medication Sig Dispense Refill   • aspirin 81 MG tablet Take 1 tablet by mouth Daily.     • cetirizine (ZYRTEC ALLERGY) 10 MG tablet Take 1 tablet by mouth Daily.     • EPINEPHrine (EPIPEN) 0.3 MG/0.3ML solution auto-injector injection Inject 0.3 mL into the appropriate muscle as directed by prescriber As Needed for Allergies.  3   • montelukast (SINGULAIR) 10 MG tablet Take 1 tablet by mouth Daily.     • rosuvastatin (CRESTOR) 5 MG tablet Take 1 tablet by mouth Daily. 90 tablet 4   • SYMBICORT 160-4.5 MCG/ACT inhaler Inhale 2 puffs 2 (Two) Times a Day.  11   • [DISCONTINUED] ofloxacin (FLOXIN OTIC) 0.3 % otic solution Administer 5 drops to the right ear Daily. 10 mL 0     No facility-administered encounter medications on file as of 2/11/2020.        Past Surgical History:  Past Surgical History:   Procedure Laterality Date   • MASTECTOMY MODIFIED RADICAL / SIMPLE / COMPLETE Right    • TOTAL ABDOMINAL HYSTERECTOMY      Comments: Ovaries Remain       Review of Systems:  Review of Systems   Constitutional: Negative for fever.   Respiratory: Negative for shortness of breath.    Cardiovascular: Negative for chest pain.   Skin: Positive for skin lesions (there is a lesion at the mid back in the bra line that has recently increased in size and darkened in color. It is puruititc).       I have reviewed and confirmed the accuracy of the ROS as documented by the MA/LPN/RN Nyla Alcazar MD    Vital Signs:  Visit Vitals  /70 (BP Location: Left arm, Patient Position: Sitting, Cuff Size: Adult)   Pulse 72   Temp 98.3 °F (36.8 °C) (Oral)   Resp 18   Ht 165.1 cm (65\")   Wt 87.6 kg (193 lb 3.2 oz)   LMP 01/01/1985   SpO2 99% Comment: Room air   BMI 32.15 kg/m²       Physical Exam   Cardiovascular: Normal rate, regular rhythm and normal heart sounds. Exam reveals no gallop and no friction rub.   No murmur " heard.  Pulmonary/Chest: Effort normal and breath sounds normal.   Skin:   There is a 6 mm flesh colored lesion with a central back center. The lesion is consistent with an inclusion cyst, inflamed with minimal surrounding erythema.      Excision of lesion   Date/Time: 2/11/2020 2:26 PM  Performed by: Nyla Alcazar MD  Authorized by: Nyla Alcazar MD   Preparation: Patient was prepped and draped in the usual sterile fashion.  Local anesthesia used: yes  Anesthesia: local infiltration    Anesthesia:  Local anesthesia used: yes  Local Anesthetic: lidocaine 2% without epinephrine  Anesthetic total: 3 mL    Sedation:  Patient sedated: no    Comments: The 6 mm lesion in the center of the back at T 12 was prepped with betadine. A 1.5 cm elliptical incision was made and the lesion was removed without event. Bleeding was minimal. The wound was closed with 4-0 nylon, with good results. Specimen was sent for pathology.  Montserrat receive wound care precautions and will follow up for suture removal in 14 days or prn problems.         Assessment and Plan:  Problem List Items Addressed This Visit     None      Visit Diagnoses     Inclusion cyst    -  Primary    Relevant Orders    Reference Histopathology    Pruritus              An After Visit Summary and PPPS were given to the patient.

## 2020-02-14 LAB
DX ICD CODE: NORMAL
PATH REPORT.FINAL DX SPEC: NORMAL
PATH REPORT.GROSS SPEC: NORMAL
PATH REPORT.SITE OF ORIGIN SPEC: NORMAL
PATHOLOGIST NAME: NORMAL
PAYMENT PROCEDURE: NORMAL

## 2020-02-24 ENCOUNTER — OFFICE VISIT (OUTPATIENT)
Dept: FAMILY MEDICINE CLINIC | Facility: CLINIC | Age: 65
End: 2020-02-24

## 2020-02-24 VITALS
WEIGHT: 191 LBS | RESPIRATION RATE: 18 BRPM | OXYGEN SATURATION: 98 % | BODY MASS INDEX: 31.82 KG/M2 | TEMPERATURE: 98.4 F | HEART RATE: 72 BPM | HEIGHT: 65 IN | SYSTOLIC BLOOD PRESSURE: 122 MMHG | DIASTOLIC BLOOD PRESSURE: 80 MMHG

## 2020-02-24 DIAGNOSIS — Z48.02 VISIT FOR SUTURE REMOVAL: Primary | ICD-10-CM

## 2020-02-24 PROCEDURE — 99024 POSTOP FOLLOW-UP VISIT: CPT | Performed by: FAMILY MEDICINE

## 2020-02-24 NOTE — PROGRESS NOTES
Subjective   Montserrat Cueva is a 64 y.o. female.     Chief Complaint   Patient presents with   • Suture / Staple Removal       Suture / Staple Removal   The sutures were placed 11 to 14 days ago (02/11/2020. Pathology shows epidermoid cyst.). She tried antibiotic ointment use and regular soap and water washings since the wound repair. The treatment provided no relief. There has been no drainage from the wound. There is no redness present. There is no swelling present. There is no pain present.        The following portions of the patient's history were reviewed and updated as appropriate: allergies, current medications, past family history, past medical history, past social history, past surgical history and problem list.    Allergies:  No Known Allergies    Social History:  Social History     Socioeconomic History   • Marital status:      Spouse name: Not on file   • Number of children: Not on file   • Years of education: Not on file   • Highest education level: Not on file   Tobacco Use   • Smoking status: Never Smoker   • Smokeless tobacco: Never Used   Substance and Sexual Activity   • Alcohol use: No     Frequency: Never   • Drug use: No   • Sexual activity: Defer       Family History:  Family History   Problem Relation Age of Onset   • Heart disease Father    • Stroke Father    • Heart disease Brother    • Kidney disease Brother    • Leukemia Son        Past Medical History :  Patient Active Problem List   Diagnosis   • Chronic allergic rhinitis due to pollen   • History of malignant neoplasm of female breast   • Hyperlipidemia   • Asymptomatic menopausal state   • Class 2 obesity due to excess calories without serious comorbidity in adult   • Screening for malignant neoplasm of cervix   • Annual visit for general adult medical examination with abnormal findings   • Visit for screening mammogram   • Colon cancer screening   • Discharge of right ear present   • Perforation of right tympanic membrane  "      Medication List:  Outpatient Encounter Medications as of 2/24/2020   Medication Sig Dispense Refill   • aspirin 81 MG tablet Take 1 tablet by mouth Daily.     • cetirizine (ZYRTEC ALLERGY) 10 MG tablet Take 1 tablet by mouth Daily.     • EPINEPHrine (EPIPEN) 0.3 MG/0.3ML solution auto-injector injection Inject 0.3 mL into the appropriate muscle as directed by prescriber As Needed for Allergies.  3   • montelukast (SINGULAIR) 10 MG tablet Take 1 tablet by mouth Daily.     • rosuvastatin (CRESTOR) 5 MG tablet Take 1 tablet by mouth Daily. 90 tablet 4   • SYMBICORT 160-4.5 MCG/ACT inhaler Inhale 2 puffs 2 (Two) Times a Day.  11     No facility-administered encounter medications on file as of 2/24/2020.        Past Surgical History:  Past Surgical History:   Procedure Laterality Date   • MASTECTOMY MODIFIED RADICAL / SIMPLE / COMPLETE Right    • TOTAL ABDOMINAL HYSTERECTOMY      Comments: Ovaries Remain       Review of Systems:  Review of Systems   Skin:        She reports that her surgical wound is well healed.       I have reviewed and confirmed the accuracy of the ROS as documented by the MA/LPN/RN Nyla Alcazar MD    Vital Signs:  Visit Vitals  /80 (BP Location: Right arm, Patient Position: Sitting, Cuff Size: Adult)   Pulse 72   Temp 98.4 °F (36.9 °C) (Oral)   Resp 18   Ht 165.1 cm (65\")   Wt 86.6 kg (191 lb)   LMP 01/01/1985   SpO2 98% Comment: Room air   BMI 31.78 kg/m²       Physical Exam   Skin:   The surgical wound in the right mid back is well healed. The on e suture was removed without difficulty.        Assessment and Plan:  Problem List Items Addressed This Visit     None      Visit Diagnoses     Visit for suture removal    -  Primary    Wound well healed suture removed. Continue wound care and notify us prn problems.           An After Visit Summary and PPPS were given to the patient.     "

## 2020-05-26 RX ORDER — MONTELUKAST SODIUM 10 MG/1
10 TABLET ORAL NIGHTLY
Qty: 90 TABLET | Refills: 3 | Status: SHIPPED | OUTPATIENT
Start: 2020-05-26 | End: 2021-05-17

## 2020-06-05 ENCOUNTER — OFFICE VISIT (OUTPATIENT)
Dept: FAMILY MEDICINE CLINIC | Facility: CLINIC | Age: 65
End: 2020-06-05

## 2020-06-05 VITALS
BODY MASS INDEX: 32.59 KG/M2 | HEART RATE: 86 BPM | WEIGHT: 195.6 LBS | TEMPERATURE: 98 F | HEIGHT: 65 IN | OXYGEN SATURATION: 97 % | DIASTOLIC BLOOD PRESSURE: 82 MMHG | RESPIRATION RATE: 18 BRPM | SYSTOLIC BLOOD PRESSURE: 132 MMHG

## 2020-06-05 DIAGNOSIS — L23.7 ALLERGIC CONTACT DERMATITIS DUE TO PLANTS, EXCEPT FOOD: Primary | ICD-10-CM

## 2020-06-05 DIAGNOSIS — J30.1 CHRONIC ALLERGIC RHINITIS DUE TO POLLEN: ICD-10-CM

## 2020-06-05 PROCEDURE — 99213 OFFICE O/P EST LOW 20 MIN: CPT | Performed by: FAMILY MEDICINE

## 2020-06-05 RX ORDER — FEXOFENADINE HCL 180 MG/1
180 TABLET ORAL DAILY
COMMUNITY
End: 2020-12-22

## 2020-06-05 RX ORDER — TRIAMCINOLONE ACETONIDE 1 MG/G
CREAM TOPICAL 2 TIMES DAILY
Qty: 80 G | Refills: 3 | Status: SHIPPED | OUTPATIENT
Start: 2020-06-05 | End: 2020-12-22

## 2020-06-05 RX ORDER — PREDNISONE 10 MG/1
10 TABLET ORAL TAKE AS DIRECTED
Qty: 35 TABLET | Refills: 0 | Status: SHIPPED | OUTPATIENT
Start: 2020-06-05 | End: 2020-06-20

## 2020-06-05 NOTE — PROGRESS NOTES
Subjective   Montserrat Cueva is a 64 y.o. female.     Chief Complaint   Patient presents with   • Rash       She is followed by allergy with [facility]. She reports slight increase in wheezing and cough. Mild nasal congestion. She is on maximum allergy meds.     Rash   This is a new problem. The current episode started in the past 7 days. The affected locations include the neck. The rash is characterized by redness and itchiness. She was exposed to nothing. Associated symptoms include congestion and coughing. Pertinent negatives include no eye pain, fatigue, fever, shortness of breath or sore throat. Past treatments include topical steroids. The treatment provided no relief.        The following portions of the patient's history were reviewed and updated as appropriate: allergies, current medications, past family history, past medical history, past social history, past surgical history and problem list.    Allergies:  No Known Allergies    Social History:  Social History     Socioeconomic History   • Marital status:      Spouse name: Not on file   • Number of children: Not on file   • Years of education: Not on file   • Highest education level: Not on file   Tobacco Use   • Smoking status: Never Smoker   • Smokeless tobacco: Never Used   Substance and Sexual Activity   • Alcohol use: No     Frequency: Never   • Drug use: No   • Sexual activity: Defer       Family History:  Family History   Problem Relation Age of Onset   • Heart disease Father    • Stroke Father    • Heart disease Brother    • Kidney disease Brother    • Leukemia Son        Past Medical History :  Patient Active Problem List   Diagnosis   • Chronic allergic rhinitis due to pollen   • History of malignant neoplasm of female breast   • Hyperlipidemia   • Asymptomatic menopausal state   • Class 2 obesity due to excess calories without serious comorbidity in adult   • Screening for malignant neoplasm of cervix   • Annual visit for general adult  medical examination with abnormal findings   • Visit for screening mammogram   • Colon cancer screening   • Discharge of right ear present   • Perforation of right tympanic membrane       Medication List:  Outpatient Encounter Medications as of 6/5/2020   Medication Sig Dispense Refill   • aspirin 81 MG tablet Take 1 tablet by mouth Daily.     • EPINEPHrine (EPIPEN) 0.3 MG/0.3ML solution auto-injector injection Inject 0.3 mL into the appropriate muscle as directed by prescriber As Needed for Allergies.  3   • fexofenadine (ALLEGRA) 180 MG tablet Take 180 mg by mouth Daily.     • montelukast (SINGULAIR) 10 MG tablet Take 1 tablet by mouth Every Night. 90 tablet 3   • rosuvastatin (CRESTOR) 5 MG tablet Take 1 tablet by mouth Daily. 90 tablet 4   • SYMBICORT 160-4.5 MCG/ACT inhaler Inhale 2 puffs 2 (Two) Times a Day.  11   • predniSONE (DELTASONE) 10 MG tablet Take 1 tablet by mouth Take As Directed for 15 days. 5 tab x 1day, 4 tab x 2 day, 3 tab x 3 day, 2 tab x 4 day, 1 tab x 5 day 35 tablet 0   • triamcinolone (KENALOG) 0.1 % cream Apply  topically to the appropriate area as directed 2 (Two) Times a Day. 80 g 3   • [DISCONTINUED] cetirizine (ZYRTEC ALLERGY) 10 MG tablet Take 1 tablet by mouth Daily.       No facility-administered encounter medications on file as of 6/5/2020.        Past Surgical History:  Past Surgical History:   Procedure Laterality Date   • MASTECTOMY MODIFIED RADICAL / SIMPLE / COMPLETE Right    • TOTAL ABDOMINAL HYSTERECTOMY      Comments: Ovaries Remain       Review of Systems:  Review of Systems   Constitutional: Negative for fatigue, fever, unexpected weight gain and unexpected weight loss.   HENT: Positive for congestion. Negative for sore throat.    Eyes: Negative for pain.   Respiratory: Positive for cough and wheezing (mild intermittent at night only. ). Negative for shortness of breath.    Gastrointestinal: Negative for abdominal pain and nausea.   Endocrine: Negative for cold  "intolerance, heat intolerance, polydipsia and polyuria.   Musculoskeletal: Negative for arthralgias and myalgias.   Skin: Positive for rash.   Neurological: Negative for weakness, numbness and headache.   Hematological: Negative for adenopathy. Does not bruise/bleed easily.       I have reviewed and confirmed the accuracy of the ROS as documented by the MA/LPN/RN Nyla Alcazar MD    Vital Signs:  Visit Vitals  /82 (BP Location: Left arm, Patient Position: Sitting, Cuff Size: Large Adult)   Pulse 86   Temp 98 °F (36.7 °C) (Oral)   Resp 18   Ht 165.1 cm (65\")   Wt 88.7 kg (195 lb 9.6 oz)   LMP 01/01/1985   SpO2 97% Comment: Room air   BMI 32.55 kg/m²       Physical Exam   Constitutional: She is oriented to person, place, and time. She appears well-developed and well-nourished. No distress.   Eyes: Conjunctivae are normal.   Neck: Neck supple. No JVD present. No thyromegaly present.   Cardiovascular: Normal rate, regular rhythm, normal heart sounds and intact distal pulses. Exam reveals no gallop and no friction rub.   No murmur heard.  Pulmonary/Chest: Effort normal and breath sounds normal. No respiratory distress. She has no wheezes. She has no rales.   Musculoskeletal: She exhibits no edema.   Lymphadenopathy:     She has no cervical adenopathy.   Neurological: She is alert and oriented to person, place, and time. Coordination normal.   Skin: Skin is warm. Rash noted. No erythema.   There is a vesicular rash over the left lateral neck.    Psychiatric: She has a normal mood and affect.   Vitals reviewed.      Assessment and Plan:  Problem List Items Addressed This Visit        Low    Chronic allergic rhinitis due to pollen    Overview     She is on [facility] and improved.   Mild exacerbation, resume meds and follow         Relevant Medications    predniSONE (DELTASONE) 10 MG tablet      Other Visit Diagnoses     Allergic contact dermatitis due to plants, except food    -  Primary    Relevant Medications    " predniSONE (DELTASONE) 10 MG tablet    triamcinolone (KENALOG) 0.1 % cream          An After Visit Summary and PPPS were given to the patient.

## 2020-08-13 DIAGNOSIS — J30.1 CHRONIC ALLERGIC RHINITIS DUE TO POLLEN: Primary | ICD-10-CM

## 2020-08-13 RX ORDER — FLUTICASONE PROPIONATE 50 MCG
2 SPRAY, SUSPENSION (ML) NASAL DAILY
Qty: 1 BOTTLE | Refills: 12 | Status: SHIPPED | OUTPATIENT
Start: 2020-08-13 | End: 2020-12-22

## 2020-10-12 DIAGNOSIS — Z12.31 VISIT FOR SCREENING MAMMOGRAM: Primary | ICD-10-CM

## 2020-10-30 ENCOUNTER — HOSPITAL ENCOUNTER (OUTPATIENT)
Dept: MAMMOGRAPHY | Facility: HOSPITAL | Age: 65
Discharge: HOME | End: 2020-10-30
Admitting: FAMILY MEDICINE

## 2020-10-30 DIAGNOSIS — Z12.31 VISIT FOR SCREENING MAMMOGRAM: ICD-10-CM

## 2020-10-30 PROCEDURE — 77067 SCR MAMMO BI INCL CAD: CPT

## 2020-10-30 PROCEDURE — 77063 BREAST TOMOSYNTHESIS BI: CPT

## 2020-11-03 ENCOUNTER — OFFICE VISIT (OUTPATIENT)
Dept: FAMILY MEDICINE CLINIC | Facility: CLINIC | Age: 65
End: 2020-11-03

## 2020-11-03 VITALS
TEMPERATURE: 98.3 F | RESPIRATION RATE: 18 BRPM | OXYGEN SATURATION: 97 % | BODY MASS INDEX: 34.32 KG/M2 | WEIGHT: 206 LBS | HEART RATE: 86 BPM | DIASTOLIC BLOOD PRESSURE: 76 MMHG | HEIGHT: 65 IN | SYSTOLIC BLOOD PRESSURE: 128 MMHG

## 2020-11-03 DIAGNOSIS — Z78.0 ASYMPTOMATIC MENOPAUSAL STATE: ICD-10-CM

## 2020-11-03 DIAGNOSIS — Z00.00 WELCOME TO MEDICARE PREVENTIVE VISIT: Primary | ICD-10-CM

## 2020-11-03 DIAGNOSIS — Z12.31 ENCOUNTER FOR SCREENING MAMMOGRAM FOR MALIGNANT NEOPLASM OF BREAST: ICD-10-CM

## 2020-11-03 DIAGNOSIS — E78.2 MIXED HYPERLIPIDEMIA: ICD-10-CM

## 2020-11-03 DIAGNOSIS — E66.09 CLASS 1 OBESITY DUE TO EXCESS CALORIES WITH SERIOUS COMORBIDITY AND BODY MASS INDEX (BMI) OF 34.0 TO 34.9 IN ADULT: ICD-10-CM

## 2020-11-03 DIAGNOSIS — J30.1 CHRONIC ALLERGIC RHINITIS DUE TO POLLEN: ICD-10-CM

## 2020-11-03 DIAGNOSIS — Z12.4 SCREENING FOR MALIGNANT NEOPLASM OF CERVIX: ICD-10-CM

## 2020-11-03 DIAGNOSIS — Z12.11 COLON CANCER SCREENING: ICD-10-CM

## 2020-11-03 PROBLEM — E66.811 CLASS 1 OBESITY DUE TO EXCESS CALORIES WITH SERIOUS COMORBIDITY AND BODY MASS INDEX (BMI) OF 34.0 TO 34.9 IN ADULT: Status: ACTIVE | Noted: 2019-10-14

## 2020-11-03 LAB
BILIRUB BLD-MCNC: NEGATIVE MG/DL
CLARITY, POC: CLEAR
COLOR UR: YELLOW
GLUCOSE UR STRIP-MCNC: NEGATIVE MG/DL
KETONES UR QL: NEGATIVE
LEUKOCYTE EST, POC: ABNORMAL
NITRITE UR-MCNC: NEGATIVE MG/ML
PH UR: 5 [PH] (ref 5–8)
PROT UR STRIP-MCNC: NEGATIVE MG/DL
RBC # UR STRIP: NEGATIVE /UL
SP GR UR: 1.02 (ref 1–1.03)
UROBILINOGEN UR QL: NORMAL

## 2020-11-03 PROCEDURE — 99213 OFFICE O/P EST LOW 20 MIN: CPT | Performed by: FAMILY MEDICINE

## 2020-11-03 PROCEDURE — 81002 URINALYSIS NONAUTO W/O SCOPE: CPT | Performed by: FAMILY MEDICINE

## 2020-11-03 PROCEDURE — G0402 INITIAL PREVENTIVE EXAM: HCPCS | Performed by: FAMILY MEDICINE

## 2020-11-03 NOTE — PROGRESS NOTES
The ABCs of the Annual Wellness Visit  Welcome to Medicare Wellness Visit    Chief Complaint   Patient presents with   • Welcome To Medicare   • Hyperlipidemia       Subjective   History of Present Illness:  Montserrat Cueva is a 65 y.o. female who presents for a [place] [facility] [employer] Wellness Visit. The patient is here: for coordination of medical care to discuss health maintenance and disease prevention. Overall has: minimal activity with work/home activities, exercises 1 time per week, good appetite, feels well with no complaints, good energy level and is sleeping well. Nutrition: balanced diet. Last tetanus shot was 2012.    Hyperlipidemia  This is a chronic problem. The current episode started more than 1 year ago. The problem is controlled. Recent lipid tests were reviewed and are normal. Exacerbating diseases include obesity. She has no history of diabetes. Pertinent negatives include no chest pain, myalgias or shortness of breath. Current antihyperlipidemic treatment includes statins. The current treatment provides significant improvement of lipids. Risk factors for coronary artery disease include family history, dyslipidemia, post-menopausal and obesity.       HEALTH RISK ASSESSMENT    Recent Hospitalizations:  No hospitalization(s) within the last year.    Current Medical Providers:  Patient Care Team:  Nyla Alcazar MD as PCP - General  OttonielNyla MD as PCP - Family Medicine    Smoking Status:  Social History     Tobacco Use   Smoking Status Never Smoker   Smokeless Tobacco Never Used       Alcohol Consumption:  Social History     Substance and Sexual Activity   Alcohol Use No   • Frequency: Never       Depression Screen:   PHQ-2/PHQ-9 Depression Screening 11/3/2020   Little interest or pleasure in doing things 0   Feeling down, depressed, or hopeless 0   Trouble falling or staying asleep, or sleeping too much 0   Feeling tired or having little energy 0   Poor appetite or overeating 0   Feeling  bad about yourself - or that you are a failure or have let yourself or your family down 0   Trouble concentrating on things, such as reading the newspaper or watching television 0   Moving or speaking so slowly that other people could have noticed. Or the opposite - being so fidgety or restless that you have been moving around a lot more than usual 0   Thoughts that you would be better off dead, or of hurting yourself in some way 0   Total Score 0   If you checked off any problems, how difficult have these problems made it for you to do your work, take care of things at home, or get along with other people? Not difficult at all     I spent more than 16 minutes asking patient questions, counseling and documenting in the chart    Fall Risk Screen:  ANTONY Fall Risk Assessment was completed, and patient is at LOW risk for falls.Assessment completed on:11/3/2020    Health Habits and Functional and Cognitive Screening:  Functional & Cognitive Status 11/3/2020   Do you have difficulty preparing food and eating? No   Do you have difficulty bathing yourself, getting dressed or grooming yourself? No   Do you have difficulty using the toilet? No   Do you have difficulty moving around from place to place? No   Do you have trouble with steps or getting out of a bed or a chair? No   Current Diet Well Balanced Diet   Dental Exam Up to date        Dental Exam Comment [facility]   Eye Exam Not up to date        Eye Exam Comment [facility]   Exercise (times per week) 1 times per week   Current Exercises Include No Regular Exercise   Do you need help using the phone?  No   Are you deaf or do you have serious difficulty hearing?  No   Do you need help with transportation? No   Do you need help shopping? No   Do you need help preparing meals?  No   Do you need help with housework?  No   Do you need help with laundry? No   Do you need help taking your medications? No   Do you need help managing money? No   Do you ever drive or ride in  a car without wearing a seat belt? No   Have you felt unusual stress, anger or loneliness in the last month? No   Who do you live with? Spouse   If you need help, do you have trouble finding someone available to you? No   Have you been bothered in the last four weeks by sexual problems? No   Do you have difficulty concentrating, remembering or making decisions? No       Does the patient have evidence of cognitive impairment? No    Asprin use counseling:Taking ASA appropriately as indicated    Visual Acuity:   Visual Acuity Screening    Right eye Left eye Both eyes   Without correction: 20/25 20/50 20/25   With correction:          Recent Lab Results:        Age-appropriate Screening Schedule:  Refer to the list below for future screening recommendations based on patient's age, sex and/or medical conditions. Orders for these recommended tests are listed in the plan section. The patient has been provided with a written plan.    Health Maintenance   Topic Date Due   • ZOSTER VACCINE (2 of 3) 01/13/2016   • INFLUENZA VACCINE  08/01/2020   • LIPID PANEL  10/14/2020   • TDAP/TD VACCINES (2 - Td) 04/12/2022   • MAMMOGRAM  10/30/2022          The following portions of the patient's history were reviewed and updated as appropriate: allergies, current medications, past family history, past medical history, past social history, past surgical history and problem list.    Outpatient Medications Prior to Visit   Medication Sig Dispense Refill   • aspirin 81 MG tablet Take 1 tablet by mouth Daily.     • EPINEPHrine (EPIPEN) 0.3 MG/0.3ML solution auto-injector injection Inject 0.3 mL into the appropriate muscle as directed by prescriber As Needed for Allergies.  3   • fexofenadine (ALLEGRA) 180 MG tablet Take 180 mg by mouth Daily.     • fluticasone (FLONASE) 50 MCG/ACT nasal spray 2 sprays into the nostril(s) as directed by provider Daily. 1 bottle 12   • montelukast (SINGULAIR) 10 MG tablet Take 1 tablet by mouth Every Night. 90  tablet 3   • rosuvastatin (CRESTOR) 5 MG tablet Take 1 tablet by mouth Daily. 90 tablet 4   • SYMBICORT 160-4.5 MCG/ACT inhaler Inhale 2 puffs 2 (Two) Times a Day.  11   • triamcinolone (KENALOG) 0.1 % cream Apply  topically to the appropriate area as directed 2 (Two) Times a Day. 80 g 3     No facility-administered medications prior to visit.        Patient Active Problem List   Diagnosis   • Chronic allergic rhinitis due to pollen   • History of malignant neoplasm of female breast   • Hyperlipidemia   • Asymptomatic menopausal state   • Class 2 obesity due to excess calories without serious comorbidity in adult   • Screening for malignant neoplasm of cervix   • Annual visit for general adult medical examination with abnormal findings   • Encounter for screening mammogram for malignant neoplasm of breast   • Colon cancer screening   • Discharge of right ear present   • Perforation of right tympanic membrane       Advanced Care Planning:  ACP discussion was held with the patient during this visit. Patient does not have an advance directive, declines further assistance.    A face-to-face visit was completed today with patient.  Counseling explanation, and discussion of advanced directives was performed.   This is the first advance care visit  In a near life ending situation, from which she is not expected to recover functionally, and she is not able to speak for herself, she does not want life sustaining measures. We discussed feeding tubes, ventilators and cardiac support as well as dialysis.    I spent more than 16 minutes discussing Advanced Care Planning information and documenting in the chart.    Review of Systems   Constitutional: Negative for activity change, appetite change, fatigue, fever and unexpected weight change.   HENT: Negative for congestion, hearing loss, rhinorrhea, sinus pain, sore throat, tinnitus, trouble swallowing and voice change.    Eyes: Negative for visual disturbance.   Respiratory:  "Negative for apnea, cough, shortness of breath and wheezing.    Cardiovascular: Negative for chest pain and palpitations.   Gastrointestinal: Negative for abdominal pain, blood in stool, constipation, diarrhea, nausea and vomiting.   Endocrine: Negative for cold intolerance, heat intolerance, polydipsia and polyuria.   Genitourinary: Negative for difficulty urinating, dysuria, flank pain, frequency and hematuria.   Musculoskeletal: Negative for arthralgias, joint swelling and myalgias.   Skin: Negative for rash.   Allergic/Immunologic: Positive for environmental allergies (sxs are stable and of many years duration. ). Negative for immunocompromised state.   Neurological: Negative for dizziness, syncope, weakness, numbness and headaches.   Hematological: Negative for adenopathy. Does not bruise/bleed easily.   Psychiatric/Behavioral: Negative for dysphoric mood and suicidal ideas. The patient is not nervous/anxious.        I have reviewed and confirmed the accuracy of the HPI and ROS as documented by the MA/LPN/RN Deirdre Simon MA    Compared to one year ago, the patient feels her physical health is the same.  Compared to one year ago, the patient feels her mental health is the same.    Reviewed chart for potential of high risk medication in the elderly: yes  Reviewed chart for potential of harmful drug interactions in the elderly:yes    Objective      Vitals:    11/03/20 0956   BP: 128/76   BP Location: Right arm   Patient Position: Sitting   Cuff Size: Adult   Pulse: 86   Resp: 18   Temp: 98.3 °F (36.8 °C)   TempSrc: Temporal   SpO2: 97%   Weight: 93.4 kg (206 lb)   Height: 165.1 cm (65\")       Body mass index is 34.28 kg/m².  Discussed the patient's BMI with her. The BMI is above average; BMI management plan is completed.    Physical Exam  Constitutional:       General: She is not in acute distress.     Appearance: She is well-developed.   HENT:      Head: Normocephalic. Hair is normal.      Right Ear: " Tympanic membrane and external ear normal.      Left Ear: Tympanic membrane and external ear normal.      Nose: Nose normal. No mucosal edema.      Mouth/Throat:      Pharynx: Uvula midline.   Eyes:      General:         Right eye: No discharge.         Left eye: No discharge.      Conjunctiva/sclera: Conjunctivae normal.      Pupils: Pupils are equal, round, and reactive to light.   Neck:      Musculoskeletal: Normal range of motion and neck supple. No muscular tenderness.      Thyroid: No thyromegaly.      Vascular: No JVD.   Cardiovascular:      Rate and Rhythm: Normal rate and regular rhythm.      Chest Wall: PMI is not displaced.      Pulses:           Carotid pulses are 2+ on the right side and 2+ on the left side.       Femoral pulses are 2+ on the right side and 2+ on the left side.       Dorsalis pedis pulses are 2+ on the right side and 2+ on the left side.      Heart sounds: Normal heart sounds. No murmur. No friction rub. No gallop.       Comments: Negative Homans' no edema  Pulmonary:      Effort: Pulmonary effort is normal. No respiratory distress.      Breath sounds: Normal breath sounds. No decreased breath sounds, wheezing, rhonchi or rales.   Chest:      Chest wall: No mass.      Breasts:         Right: No tenderness.         Left: No inverted nipple, mass, nipple discharge, skin change or tenderness.   Abdominal:      General: Bowel sounds are normal. There is no distension or abdominal bruit.      Palpations: Abdomen is soft. There is no mass.      Tenderness: There is no abdominal tenderness.      Hernia: There is no hernia in the left inguinal area.   Musculoskeletal: Normal range of motion.         General: No tenderness or deformity.   Lymphadenopathy:      Cervical: No cervical adenopathy.      Upper Body:      Right upper body: No supraclavicular or axillary adenopathy.      Left upper body: No supraclavicular or axillary adenopathy.      Lower Body: No right inguinal adenopathy. No left  inguinal adenopathy.   Skin:     General: Skin is warm and dry.      Findings: No ecchymosis, erythema, lesion or rash.   Neurological:      Mental Status: She is alert and oriented to person, place, and time.      Cranial Nerves: No cranial nerve deficit.      Sensory: No sensory deficit.      Motor: No abnormal muscle tone.      Coordination: Coordination normal.      Deep Tendon Reflexes: Reflexes are normal and symmetric. Reflexes normal.   Psychiatric:         Speech: Speech normal.         Behavior: Behavior normal.         Thought Content: Thought content normal. Thought content does not include suicidal ideation.         Cognition and Memory: She does not exhibit impaired recent memory or impaired remote memory.         Judgment: Judgment normal. Judgment is not impulsive.            ECG 12 Lead    Date/Time: 11/14/2020 9:37 AM  Performed by: Nyla Alcazar MD  Authorized by: Nyla Alcazar MD   Comparison: not compared with previous ECG   Rhythm: sinus rhythm  Rate: normal  BPM: 70  ST Segments: ST segments normal  T Waves: T waves normal  Other findings: non-specific ST-T wave changes    Clinical impression: normal ECG              Assessment/Plan   Medicare Risks and Personalized Health Plan  CMS Preventative Services Quick Reference  Cardiovascular risk    The above risks/problems have been discussed with the patient.  Pertinent information has been shared with the patient in the After Visit Summary.  Follow up plans and orders are seen below in the Assessment/Plan Section.    Problem List Items Addressed This Visit        Medium    Hyperlipidemia    Overview     Stable on meds. Compliant            Unprioritized    Asymptomatic menopausal state    Overview     Declines  dexa scan 02/12/2014. WNL Repeat 2019         Screening for malignant neoplasm of cervix    Overview     S/P [id number] BSO for fibroids.  Pap smear 01/04/2013. WNL         Annual visit for general adult medical examination with abnormal  findings - Primary    Encounter for screening mammogram for malignant neoplasm of breast    Overview     Last mammogram left breast 10/30/2020. normal         Colon cancer screening    Overview     Cologuard 10/17/2019- negative. Repeat 2022             Follow Up:  No follow-ups on file.     An After Visit Summary and PPPS were given to the patient.       I wore protective equipment throughout this patient encounter to include mask and eye protection. Hand hygiene was performed before donning protective equipment and after removal when leaving the room.     Site care done- cleaned with alcohol swab, procedure tolerated well, dressing applied. Venipuncture was obtained after 1 time(s). 2 tubes were drawn. Needle gauge used was 22.

## 2020-11-04 LAB
ALBUMIN SERPL-MCNC: 4 G/DL (ref 3.8–4.8)
ALBUMIN/GLOB SERPL: 1.3 {RATIO} (ref 1.2–2.2)
ALP SERPL-CCNC: 78 IU/L (ref 39–117)
ALT SERPL-CCNC: 13 IU/L (ref 0–32)
AST SERPL-CCNC: 15 IU/L (ref 0–40)
BASOPHILS # BLD AUTO: 0 X10E3/UL (ref 0–0.2)
BASOPHILS NFR BLD AUTO: 0 %
BILIRUB SERPL-MCNC: 0.3 MG/DL (ref 0–1.2)
BUN SERPL-MCNC: 14 MG/DL (ref 8–27)
BUN/CREAT SERPL: 20 (ref 12–28)
CALCIUM SERPL-MCNC: 9.2 MG/DL (ref 8.7–10.3)
CHLORIDE SERPL-SCNC: 106 MMOL/L (ref 96–106)
CHOLEST SERPL-MCNC: 164 MG/DL (ref 100–199)
CHOLEST/HDLC SERPL: 2.9 RATIO (ref 0–4.4)
CO2 SERPL-SCNC: 23 MMOL/L (ref 20–29)
CREAT SERPL-MCNC: 0.7 MG/DL (ref 0.57–1)
EOSINOPHIL # BLD AUTO: 0.2 X10E3/UL (ref 0–0.4)
EOSINOPHIL NFR BLD AUTO: 3 %
ERYTHROCYTE [DISTWIDTH] IN BLOOD BY AUTOMATED COUNT: 13.5 % (ref 11.7–15.4)
GLOBULIN SER CALC-MCNC: 3 G/DL (ref 1.5–4.5)
GLUCOSE SERPL-MCNC: 92 MG/DL (ref 65–99)
HCT VFR BLD AUTO: 40.7 % (ref 34–46.6)
HDLC SERPL-MCNC: 57 MG/DL
HGB BLD-MCNC: 13.4 G/DL (ref 11.1–15.9)
IMM GRANULOCYTES # BLD AUTO: 0 X10E3/UL (ref 0–0.1)
IMM GRANULOCYTES NFR BLD AUTO: 0 %
LDLC SERPL CALC-MCNC: 93 MG/DL (ref 0–99)
LYMPHOCYTES # BLD AUTO: 1.9 X10E3/UL (ref 0.7–3.1)
LYMPHOCYTES NFR BLD AUTO: 31 %
MCH RBC QN AUTO: 28.3 PG (ref 26.6–33)
MCHC RBC AUTO-ENTMCNC: 32.9 G/DL (ref 31.5–35.7)
MCV RBC AUTO: 86 FL (ref 79–97)
MONOCYTES # BLD AUTO: 0.6 X10E3/UL (ref 0.1–0.9)
MONOCYTES NFR BLD AUTO: 10 %
NEUTROPHILS # BLD AUTO: 3.5 X10E3/UL (ref 1.4–7)
NEUTROPHILS NFR BLD AUTO: 56 %
PLATELET # BLD AUTO: 301 X10E3/UL (ref 150–450)
POTASSIUM SERPL-SCNC: 4.3 MMOL/L (ref 3.5–5.2)
PROT SERPL-MCNC: 7 G/DL (ref 6–8.5)
RBC # BLD AUTO: 4.73 X10E6/UL (ref 3.77–5.28)
SODIUM SERPL-SCNC: 140 MMOL/L (ref 134–144)
TRIGL SERPL-MCNC: 74 MG/DL (ref 0–149)
TSH SERPL DL<=0.005 MIU/L-ACNC: 0.62 UIU/ML (ref 0.45–4.5)
VLDLC SERPL CALC-MCNC: 14 MG/DL (ref 5–40)
WBC # BLD AUTO: 6.3 X10E3/UL (ref 3.4–10.8)

## 2020-11-14 PROBLEM — H92.11 DISCHARGE OF RIGHT EAR PRESENT: Status: RESOLVED | Noted: 2019-12-06 | Resolved: 2020-11-14

## 2020-11-14 PROCEDURE — G0403 EKG FOR INITIAL PREVENT EXAM: HCPCS | Performed by: FAMILY MEDICINE

## 2020-12-22 ENCOUNTER — OFFICE VISIT (OUTPATIENT)
Dept: FAMILY MEDICINE CLINIC | Facility: CLINIC | Age: 65
End: 2020-12-22

## 2020-12-22 VITALS
BODY MASS INDEX: 31.23 KG/M2 | HEIGHT: 67 IN | SYSTOLIC BLOOD PRESSURE: 140 MMHG | TEMPERATURE: 98.4 F | WEIGHT: 199 LBS | OXYGEN SATURATION: 96 % | DIASTOLIC BLOOD PRESSURE: 80 MMHG | RESPIRATION RATE: 18 BRPM | HEART RATE: 88 BPM

## 2020-12-22 DIAGNOSIS — R50.9 FEVER, UNSPECIFIED FEVER CAUSE: ICD-10-CM

## 2020-12-22 DIAGNOSIS — E66.09 CLASS 1 OBESITY DUE TO EXCESS CALORIES WITH SERIOUS COMORBIDITY AND BODY MASS INDEX (BMI) OF 34.0 TO 34.9 IN ADULT: ICD-10-CM

## 2020-12-22 DIAGNOSIS — R51.9 ACUTE NONINTRACTABLE HEADACHE, UNSPECIFIED HEADACHE TYPE: Primary | ICD-10-CM

## 2020-12-22 PROCEDURE — 99213 OFFICE O/P EST LOW 20 MIN: CPT | Performed by: FAMILY MEDICINE

## 2020-12-23 LAB — SARS-COV-2 RNA RESP QL NAA+PROBE: DETECTED

## 2020-12-31 RX ORDER — ROSUVASTATIN CALCIUM 5 MG/1
5 TABLET, COATED ORAL DAILY
Qty: 90 TABLET | Refills: 4 | Status: SHIPPED | OUTPATIENT
Start: 2020-12-31 | End: 2022-01-14

## 2021-01-08 ENCOUNTER — OFFICE VISIT (OUTPATIENT)
Dept: FAMILY MEDICINE CLINIC | Facility: CLINIC | Age: 66
End: 2021-01-08

## 2021-01-08 VITALS
HEART RATE: 82 BPM | BODY MASS INDEX: 31.11 KG/M2 | OXYGEN SATURATION: 97 % | HEIGHT: 67 IN | SYSTOLIC BLOOD PRESSURE: 120 MMHG | DIASTOLIC BLOOD PRESSURE: 62 MMHG | RESPIRATION RATE: 18 BRPM | WEIGHT: 198.2 LBS | TEMPERATURE: 97.1 F

## 2021-01-08 DIAGNOSIS — E78.2 MIXED HYPERLIPIDEMIA: ICD-10-CM

## 2021-01-08 DIAGNOSIS — R07.9 CHEST PAIN, UNSPECIFIED TYPE: ICD-10-CM

## 2021-01-08 DIAGNOSIS — R00.2 PALPITATIONS: ICD-10-CM

## 2021-01-08 DIAGNOSIS — E66.09 CLASS 1 OBESITY DUE TO EXCESS CALORIES WITH SERIOUS COMORBIDITY AND BODY MASS INDEX (BMI) OF 34.0 TO 34.9 IN ADULT: ICD-10-CM

## 2021-01-08 DIAGNOSIS — I48.0 PAROXYSMAL ATRIAL FIBRILLATION (HCC): Primary | ICD-10-CM

## 2021-01-08 PROCEDURE — 93000 ELECTROCARDIOGRAM COMPLETE: CPT | Performed by: FAMILY MEDICINE

## 2021-01-08 PROCEDURE — 99214 OFFICE O/P EST MOD 30 MIN: CPT | Performed by: FAMILY MEDICINE

## 2021-01-08 RX ORDER — SOTALOL HYDROCHLORIDE 80 MG/1
80 TABLET ORAL 2 TIMES DAILY
Qty: 60 TABLET | Refills: 12 | Status: SHIPPED | OUTPATIENT
Start: 2021-01-08 | End: 2021-01-11

## 2021-01-08 RX ORDER — VITAMIN B COMPLEX
1000 TABLET ORAL 2 TIMES DAILY
COMMUNITY
End: 2021-08-31

## 2021-01-08 RX ORDER — MULTIVIT-MIN/IRON/FOLIC ACID/K 18-600-40
1 CAPSULE ORAL DAILY
COMMUNITY
End: 2021-10-12

## 2021-01-08 RX ORDER — FEXOFENADINE HCL 180 MG/1
180 TABLET ORAL DAILY
COMMUNITY
End: 2021-02-15

## 2021-01-08 RX ORDER — MULTIPLE VITAMINS W/ MINERALS TAB 9MG-400MCG
1 TAB ORAL DAILY
COMMUNITY
End: 2023-01-18 | Stop reason: SDUPTHER

## 2021-01-08 NOTE — ASSESSMENT & PLAN NOTE
Lipid abnormalities are improving with treatment.  Pharmacotherapy as ordered.  She is compliant.

## 2021-01-08 NOTE — ASSESSMENT & PLAN NOTE
With rapid ventricular response. She is stable with minimal sxs. Begin Sotalol, and Eliquis GXT and echo arranged.  Cardiology referral. Rest F/u in 72 hrs. She will present to ED over the weekend should she develop, chest pain or SOA.   The etiology of Atrial fibrillation was discussed.   Her previous lab from 11/1/2020 was reviewed, no anemia, normal TSH. CBC CMP TSH reviewed and normal.   She will follow up in 72 hrs

## 2021-01-08 NOTE — PROGRESS NOTES
Subjective   Montserrat Cueva is a 65 y.o. female.     Chief Complaint   Patient presents with   • Palpitations   • Hyperlipidemia       Palpitations   This is a new problem. The current episode started yesterday. The problem occurs intermittently. The problem has been gradually worsening. Nothing aggravates the symptoms. Associated symptoms include chest pain. Pertinent negatives include no anxiety, chest fullness, coughing, dizziness, fever, malaise/fatigue, nausea, near-syncope, numbness, shortness of breath, syncope, vomiting or weakness. She has tried nothing for the symptoms. The treatment provided no relief. Risk factors include dyslipidemia, post menopause, obesity and family history.   Hyperlipidemia  This is a chronic problem. The current episode started more than 1 year ago. The problem is controlled. Recent lipid tests were reviewed and are normal. Exacerbating diseases include obesity. She has no history of diabetes. Associated symptoms include chest pain. Pertinent negatives include no shortness of breath. Current antihyperlipidemic treatment includes statins. The current treatment provides significant improvement of lipids. Risk factors for coronary artery disease include dyslipidemia, post-menopausal, obesity and family history.   Chest Pain   This is a new problem. The current episode started today. The onset quality is sudden. The problem occurs intermittently. The problem has been gradually improving. The pain is present in the substernal region. The pain is at a severity of 2/10. Associated symptoms include palpitations. Pertinent negatives include no abdominal pain, cough, dizziness, fever, malaise/fatigue, nausea, near-syncope, numbness, shortness of breath, syncope, vomiting or weakness.   Her past medical history is significant for hyperlipidemia.   Pertinent negatives for past medical history include no diabetes.        The following portions of the patient's history were reviewed and updated  as appropriate: allergies, current medications, past family history, past medical history, past social history, past surgical history and problem list.    Allergies:  No Known Allergies    Social History:  Social History     Socioeconomic History   • Marital status:      Spouse name: Not on file   • Number of children: Not on file   • Years of education: Not on file   • Highest education level: Not on file   Tobacco Use   • Smoking status: Never Smoker   • Smokeless tobacco: Never Used   Substance and Sexual Activity   • Alcohol use: No     Frequency: Never   • Drug use: No   • Sexual activity: Defer       Family History:  Family History   Problem Relation Age of Onset   • Heart disease Father    • Stroke Father    • Heart disease Brother    • Kidney disease Brother    • Leukemia Son    • Breast cancer Neg Hx    • Ovarian cancer Neg Hx        Past Medical History :  Patient Active Problem List   Diagnosis   • Chronic allergic rhinitis due to pollen   • History of malignant neoplasm of female breast   • Mixed hyperlipidemia   • Asymptomatic menopausal state   • Class 1 obesity due to excess calories with serious comorbidity and body mass index (BMI) of 34.0 to 34.9 in adult   • Screening for malignant neoplasm of cervix   • Annual visit for general adult medical examination with abnormal findings   • Encounter for screening mammogram for malignant neoplasm of breast   • Colon cancer screening   • Perforation of right tympanic membrane   • Paroxysmal atrial fibrillation (CMS/HCC)       Medication List:  Outpatient Encounter Medications as of 1/8/2021   Medication Sig Dispense Refill   • Ascorbic Acid (Vitamin C) 500 MG capsule Take 1 capsule by mouth Daily.     • Cholecalciferol (Vitamin D) 25 MCG (1000 UT) tablet Take 1,000 Units by mouth 2 (two) times a day.     • EPINEPHrine (EPIPEN) 0.3 MG/0.3ML solution auto-injector injection Inject 0.3 mL into the appropriate muscle as directed by prescriber As Needed  for Allergies.  3   • fexofenadine (ALLEGRA) 180 MG tablet Take 180 mg by mouth Daily.     • montelukast (SINGULAIR) 10 MG tablet Take 1 tablet by mouth Every Night. 90 tablet 3   • multivitamin with minerals (MULTIVITAMIN ADULT PO) Take 1 tablet by mouth Daily.     • rosuvastatin (CRESTOR) 5 MG tablet Take 1 tablet by mouth Daily. 90 tablet 4   • Zinc Sulfate (ZINC 15 PO) Take 1 tablet by mouth Daily.     • [DISCONTINUED] aspirin 81 MG tablet Take 1 tablet by mouth Daily.     • apixaban (Eliquis) 5 MG tablet tablet Take 1 tablet by mouth 2 (Two) Times a Day. 60 tablet 0   • [DISCONTINUED] Loratadine (CLARITIN PO) Take 1 tablet by mouth Daily.     • [DISCONTINUED] sotalol (Betapace) 80 MG tablet Take 1 tablet by mouth 2 (Two) Times a Day. 60 tablet 12     No facility-administered encounter medications on file as of 1/8/2021.        Past Surgical History:  Past Surgical History:   Procedure Laterality Date   • BREAST BIOPSY     • MASTECTOMY MODIFIED RADICAL / SIMPLE / COMPLETE Right    • TOTAL ABDOMINAL HYSTERECTOMY      Comments: Ovaries Remain       Review of Systems:  Review of Systems   Constitutional: Negative for fever, malaise/fatigue and unexpected weight gain.   Respiratory: Negative for cough, shortness of breath and wheezing.    Cardiovascular: Positive for chest pain and palpitations. Negative for leg swelling, syncope and near-syncope.   Gastrointestinal: Negative for abdominal pain, nausea and vomiting.   Endocrine: Negative for cold intolerance, heat intolerance, polyphagia and polyuria.   Skin: Negative for rash.   Neurological: Negative for dizziness, weakness, numbness and headache.   Hematological: Negative for adenopathy. Does not bruise/bleed easily.   Psychiatric/Behavioral: The patient is not nervous/anxious.        I have reviewed and confirmed the accuracy of the HPI and ROS as documented by the MA/LPN/RN Nyla Alcazar MD    Vital Signs:  Visit Vitals  /62 (BP Location: Left arm,  "Patient Position: Sitting, Cuff Size: Large Adult)   Pulse 82   Temp 97.1 °F (36.2 °C) (Temporal)   Resp 18   Ht 170.2 cm (67\")   Wt 89.9 kg (198 lb 3.2 oz)   LMP 01/01/1985   SpO2 97% Comment: Room air   BMI 31.04 kg/m²       Physical Exam  Constitutional:       General: She is not in acute distress.     Appearance: She is well-developed.   Neck:      Musculoskeletal: Neck supple.      Thyroid: No thyromegaly.      Vascular: No JVD.   Cardiovascular:      Rate and Rhythm: Normal rate and regular rhythm.      Pulses:           Dorsalis pedis pulses are 2+ on the right side and 2+ on the left side.      Heart sounds: Normal heart sounds. No murmur. No friction rub. No gallop.    Pulmonary:      Effort: Pulmonary effort is normal. No respiratory distress.      Breath sounds: Normal breath sounds. No wheezing or rales.   Musculoskeletal:      Right lower leg: No edema.      Left lower leg: No edema.   Lymphadenopathy:      Cervical: No cervical adenopathy.   Skin:     General: Skin is warm.      Findings: No erythema or rash.   Neurological:      Mental Status: She is alert and oriented to person, place, and time.      Coordination: Coordination normal.              ECG 12 Lead    Date/Time: 1/8/2021 2:22 PM  Performed by: Nyla Alcazar MD  Authorized by: Nyla Alcazar MD   Comparison: compared with previous ECG   Comparison to previous ECG: Sinus rhythmn  Rhythm: atrial fibrillation  Rate: tachycardic  BPM: 134  Other findings: non-specific ST-T wave changes    Clinical impression: abnormal EKG  Comments: Atrial fibrillation with rapid ventricular response. Non specific ST and T wave changes.             Assessment and Plan:  Problem List Items Addressed This Visit        High    Paroxysmal atrial fibrillation (CMS/HCC) - Primary    Overview     Onset 01/08/2021           Current Assessment & Plan     With rapid ventricular response. She is stable with minimal sxs. Begin Sotalol, and Eliquis GXT and echo " arranged.  Cardiology referral. Rest F/u in 72 hrs. She will present to ED over the weekend should she develop, chest pain or SOA.   The etiology of Atrial fibrillation was discussed.   Her previous lab from 11/1/2020 was reviewed, no anemia, normal TSH. CBC CMP TSH reviewed and normal.   She will follow up in 72 hrs         Relevant Medications    apixaban (Eliquis) 5 MG tablet tablet    Other Relevant Orders    Stress Test With Myocardial Perfusion One Day (Completed)    Adult Transthoracic Echo Complete W/ Cont if Necessary Per Protocol (Completed)       Medium    Mixed hyperlipidemia    Overview     Controlled compliant with meds.          Current Assessment & Plan     Lipid abnormalities are improving with treatment.  Pharmacotherapy as ordered.  She is compliant.             Low    Class 1 obesity due to excess calories with serious comorbidity and body mass index (BMI) of 34.0 to 34.9 in adult      Other Visit Diagnoses     Chest pain, unspecified type         Mild and intermittent presently resolved. More likely from Afib. Will arrange GXT to be complete. She has risk factors    Relevant Orders    Stress Test With Myocardial Perfusion One Day (Completed)    Palpitations        Secondary to Atrial fibrillation as above. To date she is not decompensated.     Relevant Orders    ECG 12 Lead (Completed)          An After Visit Summary and PPPS were given to the patient.       I wore protective equipment throughout this patient encounter to include mask and eye protection. Hand hygiene was performed before donning protective equipment and after removal when leaving the room.

## 2021-01-11 ENCOUNTER — OFFICE VISIT (OUTPATIENT)
Dept: FAMILY MEDICINE CLINIC | Facility: CLINIC | Age: 66
End: 2021-01-11

## 2021-01-11 VITALS
DIASTOLIC BLOOD PRESSURE: 70 MMHG | SYSTOLIC BLOOD PRESSURE: 110 MMHG | HEIGHT: 67 IN | WEIGHT: 199 LBS | BODY MASS INDEX: 31.23 KG/M2 | TEMPERATURE: 97.7 F | HEART RATE: 75 BPM | RESPIRATION RATE: 16 BRPM | OXYGEN SATURATION: 98 %

## 2021-01-11 DIAGNOSIS — E66.09 CLASS 1 OBESITY DUE TO EXCESS CALORIES WITH SERIOUS COMORBIDITY AND BODY MASS INDEX (BMI) OF 34.0 TO 34.9 IN ADULT: ICD-10-CM

## 2021-01-11 DIAGNOSIS — I48.0 PAROXYSMAL ATRIAL FIBRILLATION (HCC): Primary | ICD-10-CM

## 2021-01-11 DIAGNOSIS — E78.2 MIXED HYPERLIPIDEMIA: ICD-10-CM

## 2021-01-11 PROCEDURE — 99214 OFFICE O/P EST MOD 30 MIN: CPT | Performed by: FAMILY MEDICINE

## 2021-01-11 PROCEDURE — 93000 ELECTROCARDIOGRAM COMPLETE: CPT | Performed by: FAMILY MEDICINE

## 2021-01-11 RX ORDER — SOTALOL HYDROCHLORIDE 80 MG/1
40 TABLET ORAL 2 TIMES DAILY
Qty: 60 TABLET | Refills: 12 | Status: SHIPPED | OUTPATIENT
Start: 2021-01-11 | End: 2021-07-02

## 2021-01-11 NOTE — PROGRESS NOTES
Subjective   Montserrat Cueva is a 65 y.o. female.     Chief Complaint   Patient presents with   • Atrial Fibrillation   • Hyperlipidemia       Montserrat was last seen in office on 01/08/2021. An ekg was performed which showed atrial fibrilation. She was started on Eliquis and sotalol at that time. She reports that she is doing well on medication.    Palpitations   This is a new problem. The current episode started in the past 7 days (Last seen in office 01/08/2021). The problem occurs intermittently. The problem has been gradually worsening. Nothing aggravates the symptoms. Pertinent negatives include no anxiety, chest fullness, chest pain, fever, nausea, numbness, shortness of breath, vomiting or weakness. She has tried beta blockers (beta blocker) for the symptoms. The treatment provided no relief. Risk factors include dyslipidemia, post menopause, obesity and family history.   Atrial Fibrillation  Presents for initial visit. The condition has lasted for 3 days. Symptoms include palpitations (resolved. ). Symptoms are negative for chest pain, shortness of breath and weakness. Past treatments include anticoagulant and beta blockers. Past medical history includes atrial fibrillation and hyperlipidemia.   Hyperlipidemia  This is a chronic problem. The current episode started more than 1 year ago. The problem is controlled. Recent lipid tests were reviewed and are normal. Exacerbating diseases include obesity. She has no history of diabetes. Pertinent negatives include no chest pain or shortness of breath. Current antihyperlipidemic treatment includes statins. The current treatment provides significant improvement of lipids. Risk factors for coronary artery disease include dyslipidemia, post-menopausal, obesity and family history.        The following portions of the patient's history were reviewed and updated as appropriate: allergies, current medications, past family history, past medical history, past social history,  past surgical history and problem list.    Allergies:  No Known Allergies    Social History:  Social History     Socioeconomic History   • Marital status:      Spouse name: Not on file   • Number of children: Not on file   • Years of education: Not on file   • Highest education level: Not on file   Tobacco Use   • Smoking status: Never Smoker   • Smokeless tobacco: Never Used   Substance and Sexual Activity   • Alcohol use: No     Frequency: Never   • Drug use: No   • Sexual activity: Defer       Family History:  Family History   Problem Relation Age of Onset   • Heart disease Father    • Stroke Father    • Heart disease Brother    • Kidney disease Brother    • Leukemia Son    • Breast cancer Neg Hx    • Ovarian cancer Neg Hx        Past Medical History :  Patient Active Problem List   Diagnosis   • Chronic allergic rhinitis due to pollen   • History of malignant neoplasm of female breast   • Mixed hyperlipidemia   • Asymptomatic menopausal state   • Class 1 obesity due to excess calories with serious comorbidity and body mass index (BMI) of 34.0 to 34.9 in adult   • Screening for malignant neoplasm of cervix   • Annual visit for general adult medical examination with abnormal findings   • Encounter for screening mammogram for malignant neoplasm of breast   • Colon cancer screening   • Perforation of right tympanic membrane   • Paroxysmal atrial fibrillation (CMS/HCC)       Medication List:  Outpatient Encounter Medications as of 1/11/2021   Medication Sig Dispense Refill   • apixaban (Eliquis) 5 MG tablet tablet Take 1 tablet by mouth 2 (Two) Times a Day. 60 tablet 0   • Ascorbic Acid (Vitamin C) 500 MG capsule Take 1 capsule by mouth Daily.     • Cholecalciferol (Vitamin D) 25 MCG (1000 UT) tablet Take 1,000 Units by mouth 2 (two) times a day.     • EPINEPHrine (EPIPEN) 0.3 MG/0.3ML solution auto-injector injection Inject 0.3 mL into the appropriate muscle as directed by prescriber As Needed for Allergies.   "3   • fexofenadine (ALLEGRA) 180 MG tablet Take 180 mg by mouth Daily.     • montelukast (SINGULAIR) 10 MG tablet Take 1 tablet by mouth Every Night. 90 tablet 3   • multivitamin with minerals (MULTIVITAMIN ADULT PO) Take 1 tablet by mouth Daily.     • rosuvastatin (CRESTOR) 5 MG tablet Take 1 tablet by mouth Daily. 90 tablet 4   • sotalol (Betapace) 80 MG tablet Take 0.5 tablets by mouth 2 (Two) Times a Day. 60 tablet 12   • Zinc Sulfate (ZINC 15 PO) Take 1 tablet by mouth Daily.     • [DISCONTINUED] sotalol (Betapace) 80 MG tablet Take 1 tablet by mouth 2 (Two) Times a Day. 60 tablet 12     No facility-administered encounter medications on file as of 1/11/2021.        Past Surgical History:  Past Surgical History:   Procedure Laterality Date   • BREAST BIOPSY     • MASTECTOMY MODIFIED RADICAL / SIMPLE / COMPLETE Right    • TOTAL ABDOMINAL HYSTERECTOMY      Comments: Ovaries Remain       Review of Systems:  Review of Systems   Constitutional: Negative for fatigue and fever.   Respiratory: Negative for shortness of breath.    Cardiovascular: Positive for palpitations (resolved. ). Negative for chest pain and leg swelling.   Gastrointestinal: Negative for nausea and vomiting.   Endocrine: Negative for cold intolerance, heat intolerance, polydipsia and polyuria.   Neurological: Negative for weakness, numbness and headache.   Psychiatric/Behavioral: The patient is not nervous/anxious.        I have reviewed and confirmed the accuracy of the HPI and ROS as documented by the MA/LPN/RN Nyla Alcazar MD    Vital Signs:  Visit Vitals  /70 (BP Location: Right arm, Patient Position: Sitting, Cuff Size: Adult)   Pulse 75   Temp 97.7 °F (36.5 °C) (Temporal)   Resp 16   Ht 170.2 cm (67\")   Wt 90.3 kg (199 lb)   LMP 01/01/1985   SpO2 98% Comment: Room air   BMI 31.17 kg/m²       Physical Exam  Constitutional:       General: She is not in acute distress.     Appearance: She is well-developed.   Neck:      " Musculoskeletal: Neck supple.      Thyroid: No thyromegaly.      Vascular: No JVD.   Cardiovascular:      Rate and Rhythm: Normal rate and regular rhythm.      Pulses:           Dorsalis pedis pulses are 2+ on the right side and 2+ on the left side.      Heart sounds: Normal heart sounds. No murmur. No friction rub. No gallop.       Comments: Negative Santos's  Pulmonary:      Effort: Pulmonary effort is normal. No respiratory distress.      Breath sounds: Normal breath sounds. No wheezing or rales.   Musculoskeletal:      Right lower leg: No edema.      Left lower leg: No edema.   Lymphadenopathy:      Cervical: No cervical adenopathy.   Skin:     General: Skin is warm.      Findings: No erythema or rash.   Neurological:      Mental Status: She is alert and oriented to person, place, and time.      Coordination: Coordination normal.         ECG 12 Lead    Date/Time: 1/11/2021 5:49 PM  Performed by: Nyla Alcazar MD  Authorized by: Nyla Alcazar MD   Comparison: compared with previous ECG from 1/8/2021  Comparison to previous ECG: Atrial Fibrillation  Rhythm: sinus rhythm  Rate: normal  BPM: 64  Conduction: conduction normal  QRS axis: normal  Other findings: non-specific ST-T wave changes    Clinical impression: normal ECG          Assessment and Plan:  Problem List Items Addressed This Visit        High    Paroxysmal atrial fibrillation (CMS/HCC) - Primary    Overview     Onset 01/08/2021  Converted 01/11/2021         Current Assessment & Plan     Decrease sotalol to 40 mg twice per day given pulse is 64. She is feeling mild fatigue  Echo and GXT pending notify her of results  Cardiology referral.   Continue Eliquis   She has stopped caffine         Relevant Medications    sotalol (Betapace) 80 MG tablet       Medium    Mixed hyperlipidemia    Overview     Controlled compliant with meds.             Low    Class 1 obesity due to excess calories with serious comorbidity and body mass index (BMI) of 34.0 to  34.9 in adult          An After Visit Summary and PPPS were given to the patient.       I wore protective equipment throughout this patient encounter to include mask and eye protection. Hand hygiene was performed before donning protective equipment and after removal when leaving the room.

## 2021-01-11 NOTE — ASSESSMENT & PLAN NOTE
Decrease sotalol to 40 mg twice per day given pulse is 64. She is feeling mild fatigue  Echo and GXT pending notify her of results  Cardiology referral.   Continue Eliquis   She has stopped caffine

## 2021-01-12 ENCOUNTER — HOSPITAL ENCOUNTER (OUTPATIENT)
Dept: NUCLEAR MEDICINE | Facility: HOSPITAL | Age: 66
Discharge: HOME | End: 2021-01-12

## 2021-01-12 ENCOUNTER — HOSPITAL ENCOUNTER (OUTPATIENT)
Dept: CARDIOLOGY | Facility: HOSPITAL | Age: 66
Discharge: HOME | End: 2021-01-12

## 2021-01-12 VITALS
HEIGHT: 67 IN | SYSTOLIC BLOOD PRESSURE: 136 MMHG | WEIGHT: 199 LBS | DIASTOLIC BLOOD PRESSURE: 76 MMHG | BODY MASS INDEX: 31.23 KG/M2

## 2021-01-12 DIAGNOSIS — I48.0 PAROXYSMAL ATRIAL FIBRILLATION (HCC): ICD-10-CM

## 2021-01-12 DIAGNOSIS — R07.9 CHEST PAIN, UNSPECIFIED TYPE: ICD-10-CM

## 2021-01-12 LAB
BH CV ECHO MEAS - ACS: 2 CM
BH CV ECHO MEAS - AI DEC SLOPE: 149 CM/SEC^2
BH CV ECHO MEAS - AI DEC TIME: 2.9 SEC
BH CV ECHO MEAS - AI MAX PG: 68.6 MMHG
BH CV ECHO MEAS - AI MAX VEL: 413.1 CM/SEC
BH CV ECHO MEAS - AI P1/2T: 812 MSEC
BH CV ECHO MEAS - AO MAX PG (FULL): 4.4 MMHG
BH CV ECHO MEAS - AO MAX PG: 6.7 MMHG
BH CV ECHO MEAS - AO MEAN PG (FULL): 2.5 MMHG
BH CV ECHO MEAS - AO MEAN PG: 3.7 MMHG
BH CV ECHO MEAS - AO ROOT AREA (BSA CORRECTED): 1.7
BH CV ECHO MEAS - AO ROOT AREA: 9.1 CM^2
BH CV ECHO MEAS - AO ROOT DIAM: 3.4 CM
BH CV ECHO MEAS - AO V2 MAX: 129.6 CM/SEC
BH CV ECHO MEAS - AO V2 MEAN: 91.8 CM/SEC
BH CV ECHO MEAS - AO V2 VTI: 31.1 CM
BH CV ECHO MEAS - AORTIC HR: 59.3 BPM
BH CV ECHO MEAS - AORTIC R-R: 1 SEC
BH CV ECHO MEAS - AVA(I,A): 2.1 CM^2
BH CV ECHO MEAS - AVA(I,D): 2.1 CM^2
BH CV ECHO MEAS - AVA(V,A): 2.1 CM^2
BH CV ECHO MEAS - AVA(V,D): 2.1 CM^2
BH CV ECHO MEAS - BSA(HAYCOCK): 2.1 M^2
BH CV ECHO MEAS - BSA: 2 M^2
BH CV ECHO MEAS - BZI_BMI: 31.2 KILOGRAMS/M^2
BH CV ECHO MEAS - BZI_METRIC_HEIGHT: 170.2 CM
BH CV ECHO MEAS - BZI_METRIC_WEIGHT: 90.3 KG
BH CV ECHO MEAS - CI(AO): 8.3 L/MIN/M^2
BH CV ECHO MEAS - CI(LVOT): 1.9 L/MIN/M^2
BH CV ECHO MEAS - CO(AO): 16.7 L/MIN
BH CV ECHO MEAS - CO(LVOT): 3.9 L/MIN
BH CV ECHO MEAS - EDV(CUBED): 142.8 ML
BH CV ECHO MEAS - EDV(MOD-SP4): 110.4 ML
BH CV ECHO MEAS - EDV(TEICH): 131.1 ML
BH CV ECHO MEAS - EF(CUBED): 61.8 %
BH CV ECHO MEAS - EF(MOD-BP): 61 %
BH CV ECHO MEAS - EF(MOD-SP4): 60.8 %
BH CV ECHO MEAS - EF(TEICH): 52.9 %
BH CV ECHO MEAS - ESV(CUBED): 54.6 ML
BH CV ECHO MEAS - ESV(MOD-SP4): 43.3 ML
BH CV ECHO MEAS - ESV(TEICH): 61.7 ML
BH CV ECHO MEAS - FS: 27.4 %
BH CV ECHO MEAS - IVS/LVPW: 0.99
BH CV ECHO MEAS - IVSD: 1.2 CM
BH CV ECHO MEAS - LA DIMENSION(2D): 3.9 CM
BH CV ECHO MEAS - LV DIASTOLIC VOL/BSA (35-75): 54.7 ML/M^2
BH CV ECHO MEAS - LV MASS(C)D: 259.3 GRAMS
BH CV ECHO MEAS - LV MASS(C)DI: 128.5 GRAMS/M^2
BH CV ECHO MEAS - LV MAX PG: 2.3 MMHG
BH CV ECHO MEAS - LV MEAN PG: 1.2 MMHG
BH CV ECHO MEAS - LV SYSTOLIC VOL/BSA (12-30): 21.4 ML/M^2
BH CV ECHO MEAS - LV V1 MAX: 76.1 CM/SEC
BH CV ECHO MEAS - LV V1 MEAN: 50.7 CM/SEC
BH CV ECHO MEAS - LV V1 VTI: 18.1 CM
BH CV ECHO MEAS - LVIDD: 5.2 CM
BH CV ECHO MEAS - LVIDS: 3.8 CM
BH CV ECHO MEAS - LVOT AREA: 3.6 CM^2
BH CV ECHO MEAS - LVOT DIAM: 2.1 CM
BH CV ECHO MEAS - LVPWD: 1.2 CM
BH CV ECHO MEAS - MR MAX PG: 78.9 MMHG
BH CV ECHO MEAS - MR MAX VEL: 444 CM/SEC
BH CV ECHO MEAS - MV A MAX VEL: 80.2 CM/SEC
BH CV ECHO MEAS - MV DEC SLOPE: 284.7 CM/SEC^2
BH CV ECHO MEAS - MV DEC TIME: 0.26 SEC
BH CV ECHO MEAS - MV E MAX VEL: 74.7 CM/SEC
BH CV ECHO MEAS - MV E/A: 0.93
BH CV ECHO MEAS - MV MAX PG: 2.7 MMHG
BH CV ECHO MEAS - MV MEAN PG: 1.5 MMHG
BH CV ECHO MEAS - MV V2 MAX: 81.5 CM/SEC
BH CV ECHO MEAS - MV V2 MEAN: 58.9 CM/SEC
BH CV ECHO MEAS - MV V2 VTI: 26.2 CM
BH CV ECHO MEAS - MVA(VTI): 2.5 CM^2
BH CV ECHO MEAS - PA MAX PG (FULL): 1.2 MMHG
BH CV ECHO MEAS - PA MAX PG: 2.4 MMHG
BH CV ECHO MEAS - PA V2 MAX: 77.9 CM/SEC
BH CV ECHO MEAS - PULM A REVS DUR: 0.15 SEC
BH CV ECHO MEAS - PULM A REVS VEL: 28.8 CM/SEC
BH CV ECHO MEAS - PULM DIAS VEL: 41.2 CM/SEC
BH CV ECHO MEAS - PULM S/D: 1.4
BH CV ECHO MEAS - PULM SYS VEL: 57.7 CM/SEC
BH CV ECHO MEAS - PVA(V,A): 1.4 CM^2
BH CV ECHO MEAS - PVA(V,D): 1.4 CM^2
BH CV ECHO MEAS - QP/QS: 0.38
BH CV ECHO MEAS - RV MAX PG: 1.3 MMHG
BH CV ECHO MEAS - RV MEAN PG: 0.63 MMHG
BH CV ECHO MEAS - RV V1 MAX: 56.5 CM/SEC
BH CV ECHO MEAS - RV V1 MEAN: 37.7 CM/SEC
BH CV ECHO MEAS - RV V1 VTI: 13.2 CM
BH CV ECHO MEAS - RVDD: 2.8 CM
BH CV ECHO MEAS - RVOT AREA: 1.9 CM^2
BH CV ECHO MEAS - RVOT DIAM: 1.6 CM
BH CV ECHO MEAS - SI(AO): 139.6 ML/M^2
BH CV ECHO MEAS - SI(CUBED): 43.7 ML/M^2
BH CV ECHO MEAS - SI(LVOT): 32.6 ML/M^2
BH CV ECHO MEAS - SI(MOD-SP4): 33.3 ML/M^2
BH CV ECHO MEAS - SI(TEICH): 34.4 ML/M^2
BH CV ECHO MEAS - SV(AO): 281.8 ML
BH CV ECHO MEAS - SV(CUBED): 88.2 ML
BH CV ECHO MEAS - SV(LVOT): 65.8 ML
BH CV ECHO MEAS - SV(MOD-SP4): 67.1 ML
BH CV ECHO MEAS - SV(RVOT): 25.1 ML
BH CV ECHO MEAS - SV(TEICH): 69.3 ML
BH CV STRESS BP STAGE 1: NORMAL
BH CV STRESS BP STAGE 2: NORMAL
BH CV STRESS DURATION MIN STAGE 1: 3
BH CV STRESS DURATION MIN STAGE 2: 3
BH CV STRESS DURATION MIN STAGE 3: 3
BH CV STRESS DURATION SEC STAGE 1: 0
BH CV STRESS DURATION SEC STAGE 2: 0
BH CV STRESS DURATION SEC STAGE 3: 0
BH CV STRESS GRADE STAGE 1: 10
BH CV STRESS GRADE STAGE 2: 12
BH CV STRESS GRADE STAGE 3: 14
BH CV STRESS HR STAGE 1: 106
BH CV STRESS HR STAGE 2: 117
BH CV STRESS HR STAGE 3: 115
BH CV STRESS METS STAGE 1: 5
BH CV STRESS METS STAGE 2: 7.5
BH CV STRESS METS STAGE 3: 10
BH CV STRESS PROTOCOL 1: NORMAL
BH CV STRESS RECOVERY BP: NORMAL MMHG
BH CV STRESS RECOVERY HR: 88 BPM
BH CV STRESS SPEED STAGE 1: 1.7
BH CV STRESS SPEED STAGE 2: 2.5
BH CV STRESS SPEED STAGE 3: 3.4
BH CV STRESS STAGE 1: 1
BH CV STRESS STAGE 2: 2
BH CV STRESS STAGE 3: 3
LV EF NUC BP: 48 %
MAXIMAL PREDICTED HEART RATE: 154 BPM
PERCENT MAX PREDICTED HR: 75.97 %
STRESS BASELINE BP: NORMAL MMHG
STRESS BASELINE HR: 64 BPM
STRESS PERCENT HR: 89 %
STRESS POST PEAK BP: NORMAL MMHG
STRESS POST PEAK HR: 117 BPM
STRESS TARGET HR: 131 BPM

## 2021-01-12 PROCEDURE — 93018 CV STRESS TEST I&R ONLY: CPT | Performed by: NURSE PRACTITIONER

## 2021-01-12 PROCEDURE — 78452 HT MUSCLE IMAGE SPECT MULT: CPT | Performed by: INTERNAL MEDICINE

## 2021-01-12 PROCEDURE — 0 TECHNETIUM SESTAMIBI: Performed by: FAMILY MEDICINE

## 2021-01-12 PROCEDURE — A9500 TC99M SESTAMIBI: HCPCS | Performed by: FAMILY MEDICINE

## 2021-01-12 PROCEDURE — 78452 HT MUSCLE IMAGE SPECT MULT: CPT

## 2021-01-12 PROCEDURE — 93306 TTE W/DOPPLER COMPLETE: CPT | Performed by: INTERNAL MEDICINE

## 2021-01-12 PROCEDURE — 93306 TTE W/DOPPLER COMPLETE: CPT

## 2021-01-12 PROCEDURE — 93017 CV STRESS TEST TRACING ONLY: CPT

## 2021-01-12 RX ADMIN — TECHNETIUM TC 99M SESTAMIBI 1 DOSE: 1 INJECTION INTRAVENOUS at 11:00

## 2021-01-12 RX ADMIN — TECHNETIUM TC 99M SESTAMIBI 1 DOSE: 1 INJECTION INTRAVENOUS at 12:20

## 2021-01-15 ENCOUNTER — OFFICE VISIT (OUTPATIENT)
Dept: CARDIOLOGY | Facility: CLINIC | Age: 66
End: 2021-01-15

## 2021-01-15 VITALS
WEIGHT: 195 LBS | BODY MASS INDEX: 30.61 KG/M2 | HEART RATE: 58 BPM | HEIGHT: 67 IN | DIASTOLIC BLOOD PRESSURE: 76 MMHG | SYSTOLIC BLOOD PRESSURE: 131 MMHG

## 2021-01-15 DIAGNOSIS — E78.2 MIXED HYPERLIPIDEMIA: ICD-10-CM

## 2021-01-15 DIAGNOSIS — I48.0 PAROXYSMAL ATRIAL FIBRILLATION (HCC): Primary | ICD-10-CM

## 2021-01-15 PROCEDURE — 99204 OFFICE O/P NEW MOD 45 MIN: CPT | Performed by: INTERNAL MEDICINE

## 2021-01-15 PROCEDURE — 93000 ELECTROCARDIOGRAM COMPLETE: CPT | Performed by: INTERNAL MEDICINE

## 2021-01-15 NOTE — PROGRESS NOTES
Date of Office Visit: 01/15/2021  Encounter Provider: Dr. Amilcar Raya  Place of Service: [facility] CARDIOLOGY [place]  Patient Name: Montserrat Cueva  :1955  Nyla Alcazar MD    Chief Complaint   Patient presents with   • Atrial Fibrillation     consult/stress test/echo   • Hyperlipidemia     History of Present Illness:    I am pleased to see Mrs. Cueva in my office today as a new consultation.    As you know, patient is 65-year-old white female whose past medical history significant for hyperlipidemia, who is referred to me for new onset atrial fibrillation.    In 2020, prior to  patient contracted COVID-19 infection.  Patient recovered from this.  However in 2021, patient developed symptom of palpitation and was evaluated in PCP office.  EKG showed atrial fibrillation with rapid ventricular response.  Patient was started on sotalol 80 mg twice daily and Eliquis 5 mg twice daily.  Patient cardioverted.  Subsequently sotalol was decreased to 40 mg twice daily because of relative bradycardia.    In 2021, patient underwent stress test which was negative for ischemia and echocardiogram showed normal left ventricle size and function with EF of 60 to 65%.  Mild LVH is noted.    EKG showed normal sinus rhythm with bradycardia and QTc interval of 413 ms.    At present patient is maintaining sinus rhythm.  My intention is to discontinue sotalol in future.  She has underlying LVH that may be not desirable for long-term use.  I would see the patient in 3 months.  If she continues to be in sinus rhythm, I will discontinue sotalol and Eliquis at that time would be continued.          Past Medical History:   Diagnosis Date   • Asymptomatic menopausal state     Impression: dexa scan 2014. WNL Repeat 2019   • Atrial fibrillation (CMS/HCC)    • Breast cancer (CMS/HCC)     right   • Chronic allergic rhinitis due to pollen    • Disorder of bone and cartilage     Impression:  -0.3 spine, -0.7 FN, -1.3 hip, 11/2009 Wt bearing exercise encouraged 11/09   • Drug therapy     6 months in 1993   • History of malignant neoplasm of female breast     Impression: stage II, s/p right modified mastectomy   • Hydronephrosis of left kidney    • Hyperlipidemia, unspecified     Impression: Stable on meds.   • Menopausal syndrome     Impression: stage II, s/p right modified mastectomy   • Microscopic hematuria     Impression: previously negative cx No hx of stones.   • Plantar fasciitis, right     Impression: She is receiving orthospec from her podiatrist.         Past Surgical History:   Procedure Laterality Date   • BREAST BIOPSY     • MASTECTOMY MODIFIED RADICAL / SIMPLE / COMPLETE Right    • TOTAL ABDOMINAL HYSTERECTOMY      Comments: Ovaries Remain           Current Outpatient Medications:   •  apixaban (Eliquis) 5 MG tablet tablet, Take 1 tablet by mouth 2 (Two) Times a Day., Disp: 60 tablet, Rfl: 0  •  Ascorbic Acid (Vitamin C) 500 MG capsule, Take 1 capsule by mouth Daily., Disp: , Rfl:   •  Cholecalciferol (Vitamin D) 25 MCG (1000 UT) tablet, Take 1,000 Units by mouth 2 (two) times a day., Disp: , Rfl:   •  EPINEPHrine (EPIPEN) 0.3 MG/0.3ML solution auto-injector injection, Inject 0.3 mL into the appropriate muscle as directed by prescriber As Needed for Allergies., Disp: , Rfl: 3  •  fexofenadine (ALLEGRA) 180 MG tablet, Take 180 mg by mouth Daily., Disp: , Rfl:   •  montelukast (SINGULAIR) 10 MG tablet, Take 1 tablet by mouth Every Night., Disp: 90 tablet, Rfl: 3  •  multivitamin with minerals (MULTIVITAMIN ADULT PO), Take 1 tablet by mouth Daily., Disp: , Rfl:   •  rosuvastatin (CRESTOR) 5 MG tablet, Take 1 tablet by mouth Daily., Disp: 90 tablet, Rfl: 4  •  sotalol (Betapace) 80 MG tablet, Take 0.5 tablets by mouth 2 (Two) Times a Day., Disp: 60 tablet, Rfl: 12  •  Zinc Sulfate (ZINC 15 PO), Take 1 tablet by mouth Daily., Disp: , Rfl:       Social History     Socioeconomic History   •  "Marital status:      Spouse name: Not on file   • Number of children: Not on file   • Years of education: Not on file   • Highest education level: Not on file   Tobacco Use   • Smoking status: Never Smoker   • Smokeless tobacco: Never Used   Substance and Sexual Activity   • Alcohol use: No     Frequency: Never   • Drug use: No   • Sexual activity: Defer         Review of Systems   Constitution: Negative for chills and fever.   HENT: Negative for ear discharge and nosebleeds.    Eyes: Negative for discharge and redness.   Cardiovascular: Negative for chest pain, orthopnea, palpitations, paroxysmal nocturnal dyspnea and syncope.   Respiratory: Negative for cough, shortness of breath and wheezing.    Endocrine: Negative for heat intolerance.   Skin: Negative for rash.   Musculoskeletal: Negative for arthritis and myalgias.   Gastrointestinal: Negative for abdominal pain, melena, nausea and vomiting.   Genitourinary: Negative for dysuria and hematuria.   Neurological: Negative for dizziness, light-headedness, numbness and tremors.   Psychiatric/Behavioral: Positive for memory loss. Negative for depression. The patient is not nervous/anxious.        Procedures      ECG 12 Lead    Date/Time: 1/15/2021 12:50 PM  Performed by: Amilcar Raya MD  Authorized by: Amilcar Raya MD   Comparison: compared with previous ECG   Similar to previous ECG  Rhythm: sinus rhythm and sinus bradycardia    Clinical impression: normal ECG  Comments: QTc interval is 413 ms            ECG 12 Lead    (Results Pending)           Objective:    /76   Pulse 58   Ht 170.2 cm (67.01\")   Wt 88.5 kg (195 lb)   LMP 01/01/1985   BMI 30.53 kg/m²         Constitutional:       Appearance: Well-developed.   Eyes:      General: No scleral icterus.        Right eye: No discharge.   HENT:      Head: Normocephalic and atraumatic.   Neck:      Thyroid: No thyromegaly.      Lymphadenopathy: No cervical adenopathy.   Pulmonary:      Effort: " Pulmonary effort is normal. No respiratory distress.      Breath sounds: Normal breath sounds. No wheezing. No rales.   Cardiovascular:      Normal rate. Regular rhythm.      No gallop.   Abdominal:      Tenderness: There is no abdominal tenderness.   Skin:     Findings: No erythema or rash.   Neurological:      Mental Status: Alert and oriented to person, place, and time.             Assessment:       Diagnosis Plan   1. Paroxysmal atrial fibrillation (CMS/HCC)  ECG 12 Lead   2. Mixed hyperlipidemia  ECG 12 Lead            Plan:       Assessment and plan/MDM:    1.  Paroxysmal atrial fibrillation:    Patient was found to be in atrial fibrillation during her Covid infection but she recovered with sotalol and on Eliquis.  At present I will continue low-dose of sotalol.  I intend to stop it after 2 to 3 months.    2.  Hyperlipidemia:    Continue Crestor.    3.  Ischemic evaluation:    Patient underwent stress test and it is negative for ischemia.    4.  LVH:    Echocardiogram showed mild LVH.  EF is normal.  At this stage recommend observation.  Her blood pressure is very well controlled

## 2021-02-02 ENCOUNTER — TELEPHONE (OUTPATIENT)
Dept: CARDIOLOGY | Facility: CLINIC | Age: 66
End: 2021-02-02

## 2021-02-10 ENCOUNTER — TELEPHONE (OUTPATIENT)
Dept: FAMILY MEDICINE CLINIC | Facility: CLINIC | Age: 66
End: 2021-02-10

## 2021-02-10 DIAGNOSIS — J02.9 PHARYNGITIS, UNSPECIFIED ETIOLOGY: Primary | ICD-10-CM

## 2021-02-10 RX ORDER — AMOXICILLIN 500 MG/1
500 TABLET, FILM COATED ORAL 3 TIMES DAILY
Qty: 30 TABLET | Refills: 0 | Status: SHIPPED | OUTPATIENT
Start: 2021-02-10 | End: 2021-02-15

## 2021-02-10 NOTE — TELEPHONE ENCOUNTER
Montserrat called   alec sore throat no fever  unable to come in for visit, ask that amoxicillin be sent to pharmacy, so  can  on  his way home today.      Per Dr Alcazar amoxicillin  500 mg one 3 x  days for 10 days was sent to [facility] if symptoms do not improve to call.

## 2021-02-15 ENCOUNTER — HOSPITAL ENCOUNTER (OUTPATIENT)
Dept: GENERAL RADIOLOGY | Facility: HOSPITAL | Age: 66
Discharge: HOME | End: 2021-02-15
Admitting: FAMILY MEDICINE

## 2021-02-15 ENCOUNTER — OFFICE VISIT (OUTPATIENT)
Dept: FAMILY MEDICINE CLINIC | Facility: CLINIC | Age: 66
End: 2021-02-15

## 2021-02-15 VITALS
DIASTOLIC BLOOD PRESSURE: 80 MMHG | HEART RATE: 82 BPM | OXYGEN SATURATION: 96 % | WEIGHT: 200.4 LBS | RESPIRATION RATE: 16 BRPM | HEIGHT: 67 IN | TEMPERATURE: 96.8 F | BODY MASS INDEX: 31.45 KG/M2 | SYSTOLIC BLOOD PRESSURE: 148 MMHG

## 2021-02-15 DIAGNOSIS — E78.2 MIXED HYPERLIPIDEMIA: ICD-10-CM

## 2021-02-15 DIAGNOSIS — E66.09 CLASS 1 OBESITY DUE TO EXCESS CALORIES WITH SERIOUS COMORBIDITY AND BODY MASS INDEX (BMI) OF 34.0 TO 34.9 IN ADULT: ICD-10-CM

## 2021-02-15 DIAGNOSIS — I48.0 PAROXYSMAL ATRIAL FIBRILLATION (HCC): ICD-10-CM

## 2021-02-15 DIAGNOSIS — R07.89 OTHER CHEST PAIN: ICD-10-CM

## 2021-02-15 DIAGNOSIS — J90 PLEURAL EFFUSION ON RIGHT: ICD-10-CM

## 2021-02-15 DIAGNOSIS — R07.81 PLEURITIC CHEST PAIN: Primary | ICD-10-CM

## 2021-02-15 PROCEDURE — 93000 ELECTROCARDIOGRAM COMPLETE: CPT | Performed by: FAMILY MEDICINE

## 2021-02-15 PROCEDURE — 71046 X-RAY EXAM CHEST 2 VIEWS: CPT

## 2021-02-15 PROCEDURE — 99214 OFFICE O/P EST MOD 30 MIN: CPT | Performed by: FAMILY MEDICINE

## 2021-02-15 RX ORDER — LORATADINE 10 MG/1
10 TABLET ORAL DAILY
COMMUNITY
End: 2021-06-24

## 2021-02-15 NOTE — PROGRESS NOTES
Chief Complaint  Chest Pain, Atrial Fibrillation, and Hyperlipidemia    Subjective     CC  Problem List  Visit Diagnosis   Encounters  Notes  Medications  Labs  Result Review Imaging  Media    Montserrat Cueva presents to [facility] for   Chest Pain   This is a new problem. The current episode started in the past 7 days. The problem has been waxing and waning. The pain is present in the lateral region (right). The pain is at a severity of 5/10. The pain is moderate. The quality of the pain is described as sharp. The pain radiates to the upper back. Pertinent negatives include no abdominal pain, cough, dizziness, fever, headaches, lower extremity edema, nausea, near-syncope, numbness, orthopnea, palpitations, PND, shortness of breath, sputum production or weakness. She has tried NSAIDs for the symptoms. The treatment provided mild relief.   Her past medical history is significant for hyperlipidemia.   Pertinent negatives for past medical history include no diabetes.   Her family medical history is significant for heart disease and stroke. Prior workup: She has a hx of COVID.   Atrial Fibrillation  Presents for follow-up visit. Symptoms include chest pain (right sided and at times aggravated by inhaling. ). Symptoms are negative for dizziness, palpitations, shortness of breath and weakness. The symptoms have been stable. Past medical history includes atrial fibrillation and hyperlipidemia. There are no medication compliance problems.   Hyperlipidemia  This is a chronic problem. The current episode started more than 1 year ago. The problem is controlled. Recent lipid tests were reviewed and are normal. Exacerbating diseases include obesity. She has no history of diabetes. Associated symptoms include chest pain (right sided and at times aggravated by inhaling. ). Pertinent negatives include no myalgias or shortness of breath. Current antihyperlipidemic treatment includes  "statins. The current treatment provides significant improvement of lipids. Risk factors for coronary artery disease include dyslipidemia, post-menopausal, obesity and family history.       Review of Systems   Constitutional: Negative for fever, unexpected weight gain and unexpected weight loss.   Respiratory: Negative for cough, sputum production, shortness of breath and wheezing.    Cardiovascular: Positive for chest pain (right sided and at times aggravated by inhaling. ). Negative for palpitations, orthopnea, leg swelling, PND and near-syncope.   Gastrointestinal: Negative for abdominal pain and nausea.   Endocrine: Negative for cold intolerance, heat intolerance, polydipsia and polyuria.   Genitourinary: Negative for frequency.   Musculoskeletal: Negative for arthralgias and myalgias.   Skin: Negative for rash.   Neurological: Negative for dizziness, weakness and numbness.   Hematological: Negative for adenopathy. Does not bruise/bleed easily.        Objective   Vital Signs:   /80 (BP Location: Left arm, Patient Position: Sitting, Cuff Size: Adult)   Pulse 82   Temp 96.8 °F (36 °C) (Temporal)   Resp 16   Ht 170.2 cm (67\")   Wt 90.9 kg (200 lb 6.4 oz)   SpO2 96% Comment: Room air  BMI 31.39 kg/m²     Physical Exam  Constitutional:       General: She is not in acute distress.     Appearance: Normal appearance.   HENT:      Right Ear: Tympanic membrane normal.      Left Ear: Tympanic membrane normal.      Mouth/Throat:      Pharynx: No oropharyngeal exudate or posterior oropharyngeal erythema.   Eyes:      Pupils: Pupils are equal, round, and reactive to light.   Neck:      Musculoskeletal: Neck supple.      Vascular: No JVD.   Cardiovascular:      Rate and Rhythm: Normal rate and regular rhythm.      Pulses:           Carotid pulses are 2+ on the right side and 2+ on the left side.       Dorsalis pedis pulses are 2+ on the right side and 2+ on the left side.      Heart sounds: No murmur.      " Comments: Negative Santos's  Pulmonary:      Effort: Pulmonary effort is normal.      Breath sounds: Normal breath sounds. No wheezing.   Musculoskeletal:      Right lower leg: No edema.      Left lower leg: No edema.   Lymphadenopathy:      Cervical: No cervical adenopathy.   Skin:     Findings: No rash.   Neurological:      Mental Status: She is alert.        Result Review :Labs  Result Review  Imaging  Med Tab  Media            ECG 12 Lead    Date/Time: 2/15/2021 9:23 AM  Performed by: Nyla Alcazar MD  Authorized by: Nyla Alcazar MD           EKG interpretation   Sinus Rhythm rate 67   Normal tracing.   Similar to previous tracing 01/15/2021, Sinus rhythm compared to atrial fibrillation on tracing 01.08/2021  Nyla Ottoniel 02/15/2021     Assessment and Plan CC Problem List  Visit Diagnosis  ROS  Review (Popup)  Health Maintenance  Quality  BestPractice  Medications  SmartSets  SnapShot Encounters  Media  Problem List Items Addressed This Visit        High    Paroxysmal atrial fibrillation (CMS/HCC)    Overview     Onset 01/08/2021  No recurrence she is on anti coagulation and sotalol and tolerating well.          Relevant Medications    apixaban (Eliquis) 5 MG tablet tablet       Medium    Mixed hyperlipidemia    Overview     Controlled compliant with meds.             Low    Class 1 obesity due to excess calories with serious comorbidity and body mass index (BMI) of 34.0 to 34.9 in adult    Overview     Healthy diet and regular exercise encouraged.            Other Visit Diagnoses     Pleuritic chest pain    -  Primary    negative exam, negative EKG, right pleural effusion is likely etiology    Relevant Orders    ECG 12 Lead    XR Chest PA & Lateral (Completed)    Pleural effusion on right        on xray, likely residual of rescent Covid infection. Causing right pleuritic chest pain, Tylenol ice heat cough and deep breath and repeat CXR in 1 mo.     Relevant Medications    loratadine  (Claritin) 10 MG tablet          Follow Up Instructions Charge Capture  Follow-up Communications  No follow-ups on file.  Patient was given instructions and counseling regarding her condition or for health maintenance advice. Please see specific information pulled into the AVS if appropriate.

## 2021-02-25 NOTE — PROGRESS NOTES
Date of Office Visit: 2021  Encounter Provider: Dr. Amilcar Raya  Place of Service: [facility] CARDIOLOGY [place]  Patient Name: Montserrat Cueva  :1955  Nyla Alcazar MD    Chief Complaint   Patient presents with   • Atrial Fibrillation     6 week follow up   • Hyperlipidemia     History of Present Illness    I am pleased to see Mrs. Cueva in my office today as a follow-up    As you know, patient is 65-year-old white female whose past medical history significant for hyperlipidemia, who came today for follow-up    In 2020, prior to Kyle patient contracted COVID-19 infection.  Patient recovered from this.  However in 2021, patient developed symptom of palpitation and was evaluated in PCP office.  EKG showed atrial fibrillation with rapid ventricular response.  Patient was started on sotalol 80 mg twice daily and Eliquis 5 mg twice daily.  Patient cardioverted.  Subsequently sotalol was decreased to 40 mg twice daily because of relative bradycardia.    In 2021, patient underwent stress test which was negative for ischemia and echocardiogram showed normal left ventricle size and function with EF of 60 to 65%.  Mild LVH is noted.    Since the previous visit, patient is overall doing well.  She has fully recovered.  She had right-sided pleuritic chest pain.  Chest x-ray showed small right pleural effusion.  Patient is feeling better.  Patient denies any shortness of breath.  Patient denies any orthopnea or PND.  No syncope.  No leg edema.    EKG showed normal sinus rhythm.  Poor progression of R wave noted.  QT interval is 420 ms.    At this stage, patient is doing well.  At this stage I would recommend to continue sotalol and Eliquis.  In 6 months from now I would stop sotalol and possibly Eliquis.  If patient continues to do well.        Past Medical History:   Diagnosis Date   • Asymptomatic menopausal state     Impression: dexa scan 2014. WNL Repeat 2019   •  Atrial fibrillation (CMS/HCC)    • Breast cancer (CMS/HCC)     right   • Chronic allergic rhinitis due to pollen    • Disorder of bone and cartilage     Impression: -0.3 spine, -0.7 FN, -1.3 hip, 11/2009 Wt bearing exercise encouraged 11/09   • Drug therapy     6 months in 1993   • History of malignant neoplasm of female breast     Impression: stage II, s/p right modified mastectomy   • Hydronephrosis of left kidney    • Hyperlipidemia, unspecified     Impression: Stable on meds.   • Menopausal syndrome     Impression: stage II, s/p right modified mastectomy   • Microscopic hematuria     Impression: previously negative cx No hx of stones.   • Plantar fasciitis, right     Impression: She is receiving orthospec from her podiatrist.         Past Surgical History:   Procedure Laterality Date   • BREAST BIOPSY     • MASTECTOMY MODIFIED RADICAL / SIMPLE / COMPLETE Right    • TOTAL ABDOMINAL HYSTERECTOMY      Comments: Ovaries Remain           Current Outpatient Medications:   •  apixaban (Eliquis) 5 MG tablet tablet, Take 1 tablet by mouth 2 (Two) Times a Day., Disp: 60 tablet, Rfl: 12  •  Ascorbic Acid (Vitamin C) 500 MG capsule, Take 1 capsule by mouth Daily., Disp: , Rfl:   •  Cholecalciferol (Vitamin D) 25 MCG (1000 UT) tablet, Take 1,000 Units by mouth 2 (two) times a day., Disp: , Rfl:   •  EPINEPHrine (EPIPEN) 0.3 MG/0.3ML solution auto-injector injection, Inject 0.3 mL into the appropriate muscle as directed by prescriber As Needed for Allergies., Disp: , Rfl: 3  •  loratadine (Claritin) 10 MG tablet, Take 10 mg by mouth Daily., Disp: , Rfl:   •  montelukast (SINGULAIR) 10 MG tablet, Take 1 tablet by mouth Every Night., Disp: 90 tablet, Rfl: 3  •  multivitamin with minerals (MULTIVITAMIN ADULT PO), Take 1 tablet by mouth Daily., Disp: , Rfl:   •  rosuvastatin (CRESTOR) 5 MG tablet, Take 1 tablet by mouth Daily., Disp: 90 tablet, Rfl: 4  •  sotalol (Betapace) 80 MG tablet, Take 0.5 tablets by mouth 2 (Two) Times a  "Day., Disp: 60 tablet, Rfl: 12  •  Zinc Sulfate (ZINC 15 PO), Take 1 tablet by mouth Daily., Disp: , Rfl:       Social History     Socioeconomic History   • Marital status:      Spouse name: Not on file   • Number of children: Not on file   • Years of education: Not on file   • Highest education level: Not on file   Tobacco Use   • Smoking status: Never Smoker   • Smokeless tobacco: Never Used   Substance and Sexual Activity   • Alcohol use: No     Frequency: Never   • Drug use: No   • Sexual activity: Defer         Review of Systems   Constitution: Negative for chills and fever.   HENT: Negative for ear discharge and nosebleeds.    Eyes: Negative for discharge and redness.   Cardiovascular: Negative for chest pain, orthopnea, palpitations, paroxysmal nocturnal dyspnea and syncope.   Respiratory: Negative for cough, shortness of breath and wheezing.    Endocrine: Negative for heat intolerance.   Skin: Negative for rash.   Musculoskeletal: Negative for arthritis and myalgias.   Gastrointestinal: Negative for abdominal pain, melena, nausea and vomiting.   Genitourinary: Negative for dysuria and hematuria.   Neurological: Negative for dizziness, light-headedness, numbness and tremors.   Psychiatric/Behavioral: Negative for depression. The patient is not nervous/anxious.        Procedures      ECG 12 Lead    Date/Time: 2/26/2021 10:31 AM  Performed by: Amilcar Raya MD  Authorized by: Amilcar Raya MD   Comparison: compared with previous ECG   Similar to previous ECG  Rhythm: sinus rhythm    Clinical impression: normal ECG            ECG 12 Lead    (Results Pending)           Objective:    /77   Pulse 60   Ht 170.2 cm (67.01\")   Wt 88.5 kg (195 lb)   LMP 01/01/1985   BMI 30.53 kg/m²         Constitutional:       Appearance: Well-developed.   Eyes:      General: No scleral icterus.        Right eye: No discharge.   HENT:      Head: Normocephalic and atraumatic.   Neck:      Thyroid: No thyromegaly.      " Lymphadenopathy: No cervical adenopathy.   Pulmonary:      Effort: Pulmonary effort is normal. No respiratory distress.      Breath sounds: Normal breath sounds. No wheezing. No rales.   Cardiovascular:      Normal rate. Regular rhythm.      No gallop.   Abdominal:      Tenderness: There is no abdominal tenderness.   Skin:     Findings: No erythema or rash.   Neurological:      Mental Status: Alert and oriented to person, place, and time.             Assessment:       Diagnosis Plan   1. Mixed hyperlipidemia  ECG 12 Lead   2. Paroxysmal atrial fibrillation (CMS/HCC)  ECG 12 Lead            Plan:       MDM:    1.  Paroxysmal atrial fibrillation:    Patient is maintaining sinus rhythm.  Continue Eliquis and sotalol right now.  Consider stopping sotalol in 6 months and possibly Eliquis and switch to aspirin at that time.    2.  Hyperlipidemia:    Patient is on Crestor

## 2021-02-26 ENCOUNTER — OFFICE VISIT (OUTPATIENT)
Dept: CARDIOLOGY | Facility: CLINIC | Age: 66
End: 2021-02-26

## 2021-02-26 VITALS
SYSTOLIC BLOOD PRESSURE: 125 MMHG | BODY MASS INDEX: 30.61 KG/M2 | WEIGHT: 195 LBS | DIASTOLIC BLOOD PRESSURE: 77 MMHG | HEIGHT: 67 IN | HEART RATE: 60 BPM

## 2021-02-26 DIAGNOSIS — I48.0 PAROXYSMAL ATRIAL FIBRILLATION (HCC): ICD-10-CM

## 2021-02-26 DIAGNOSIS — E78.2 MIXED HYPERLIPIDEMIA: Primary | ICD-10-CM

## 2021-02-26 PROCEDURE — 93000 ELECTROCARDIOGRAM COMPLETE: CPT | Performed by: INTERNAL MEDICINE

## 2021-02-26 PROCEDURE — 99214 OFFICE O/P EST MOD 30 MIN: CPT | Performed by: INTERNAL MEDICINE

## 2021-03-10 ENCOUNTER — HOSPITAL ENCOUNTER (OUTPATIENT)
Dept: GENERAL RADIOLOGY | Facility: HOSPITAL | Age: 66
Discharge: HOME | End: 2021-03-10
Admitting: FAMILY MEDICINE

## 2021-03-10 ENCOUNTER — OFFICE VISIT (OUTPATIENT)
Dept: FAMILY MEDICINE CLINIC | Facility: CLINIC | Age: 66
End: 2021-03-10

## 2021-03-10 VITALS
TEMPERATURE: 97.7 F | DIASTOLIC BLOOD PRESSURE: 82 MMHG | HEIGHT: 67 IN | RESPIRATION RATE: 16 BRPM | WEIGHT: 202.4 LBS | SYSTOLIC BLOOD PRESSURE: 138 MMHG | BODY MASS INDEX: 31.77 KG/M2 | HEART RATE: 63 BPM | OXYGEN SATURATION: 97 %

## 2021-03-10 DIAGNOSIS — I48.0 PAROXYSMAL ATRIAL FIBRILLATION (HCC): ICD-10-CM

## 2021-03-10 DIAGNOSIS — M94.0 COSTOCHONDRITIS: ICD-10-CM

## 2021-03-10 DIAGNOSIS — E78.2 MIXED HYPERLIPIDEMIA: ICD-10-CM

## 2021-03-10 DIAGNOSIS — Z87.01 HISTORY OF PNEUMONIA: Primary | ICD-10-CM

## 2021-03-10 DIAGNOSIS — J90 PLEURAL EFFUSION ON RIGHT: ICD-10-CM

## 2021-03-10 DIAGNOSIS — E66.09 CLASS 1 OBESITY DUE TO EXCESS CALORIES WITH SERIOUS COMORBIDITY AND BODY MASS INDEX (BMI) OF 34.0 TO 34.9 IN ADULT: ICD-10-CM

## 2021-03-10 PROBLEM — Z86.16 HISTORY OF COVID-19: Status: ACTIVE | Noted: 2021-03-10

## 2021-03-10 PROCEDURE — 71046 X-RAY EXAM CHEST 2 VIEWS: CPT

## 2021-03-10 PROCEDURE — 99214 OFFICE O/P EST MOD 30 MIN: CPT | Performed by: FAMILY MEDICINE

## 2021-03-10 NOTE — PROGRESS NOTES
Chief Complaint  Pneumonia, Shoulder Injury, Atrial Fibrillation, and Hyperlipidemia    Subjective     CC  Problem List  Visit Diagnosis   Encounters  Notes  Medications  Labs  Result Review Imaging  Media    Montserrta Cueva presents to [facility] for   Montserrat was seen by Dr. Raya-cardiology for a follow up atrial fibrillation. She remains on sotalol and Eliquas She is scheduled to follow up again on 04/16/2021.    Pneumonia  She complains of frequent throat clearing. There is no chest tightness, cough, hoarse voice, shortness of breath or wheezing. Primary symptoms comments: She has Covid in 10/2020 and was later from to have RLL pneumonia which was treated. She has residual right pleural effusion. She presents for repeated CXR today. She generally feels well. She continues to have mild pain in her right lateral chest wall. It is aggravated by movement and relieved by rest . The current episode started more than 1 month ago (02/15/2021). The problem occurs constantly. The problem has been unchanged. Associated symptoms include chest pain (There is pain along the right lateral chest wall. ). Pertinent negatives include no appetite change, ear congestion, ear pain, fever, headaches, nasal congestion, orthopnea, PND, postnasal drip, sneezing, sore throat or weight loss. She reports no improvement on treatment.   Atrial Fibrillation  Presents for follow-up visit. Symptoms include chest pain (There is pain along the right lateral chest wall. ). Symptoms are negative for hypertension, palpitations, shortness of breath and weakness. The symptoms have been stable. Past medical history includes atrial fibrillation and hyperlipidemia.   Hyperlipidemia  This is a chronic problem. The current episode started more than 1 year ago. The problem is controlled. Recent lipid tests were reviewed and are normal. Exacerbating diseases include obesity. She has no history of diabetes. Associated  "symptoms include chest pain (There is pain along the right lateral chest wall. ). Pertinent negatives include no shortness of breath. Current antihyperlipidemic treatment includes statins. The current treatment provides significant improvement of lipids. Risk factors for coronary artery disease include dyslipidemia, post-menopausal, obesity and family history.       Review of Systems   Constitutional: Negative for appetite change, fever, unexpected weight gain and unexpected weight loss.   HENT: Negative for ear pain, hoarse voice, postnasal drip, sneezing and sore throat.    Eyes: Negative for visual disturbance.   Respiratory: Negative for cough, shortness of breath and wheezing.    Cardiovascular: Positive for chest pain (There is pain along the right lateral chest wall. ). Negative for palpitations and PND.   Gastrointestinal: Negative for constipation, diarrhea, nausea and vomiting.   Endocrine: Negative for cold intolerance, heat intolerance, polydipsia and polyuria.   Neurological: Negative for weakness, numbness and headache.        Objective   Vital Signs:   /82 (BP Location: Right arm, Patient Position: Sitting, Cuff Size: Adult)   Pulse 63   Temp 97.7 °F (36.5 °C) (Temporal)   Resp 16   Ht 170.2 cm (67\")   Wt 91.8 kg (202 lb 6.4 oz)   SpO2 97% Comment: Room air  BMI 31.70 kg/m²     Physical Exam  Constitutional:       General: She is not in acute distress.     Appearance: Normal appearance.   HENT:      Mouth/Throat:      Pharynx: No oropharyngeal exudate or posterior oropharyngeal erythema.   Cardiovascular:      Rate and Rhythm: Normal rate and regular rhythm.      Heart sounds: No murmur.   Pulmonary:      Effort: Pulmonary effort is normal.      Breath sounds: Normal breath sounds. No wheezing.   Chest:      Chest wall: No tenderness.   Musculoskeletal:      Cervical back: Neck supple.   Lymphadenopathy:      Cervical: No cervical adenopathy.   Skin:     Findings: No rash.        Result " Review :Labs  Result Review  Imaging  Med Tab  Media                 Assessment and Plan CC Problem List  Visit Diagnosis  ROS  Review (Popup)  Health Maintenance  Quality  BestPractice  Medications  SmartSets  SnapShot Encounters  Media  Problem List Items Addressed This Visit        High    Paroxysmal atrial fibrillation (CMS/HCC)    Overview     Onset 01/08/2021  No recurrence she is on anti coagulation and sotalol and tolerating well.   She is followed with [facility] she may stop meds in 6 mos.             Medium    Mixed hyperlipidemia    Overview     Controlled compliant with meds         Current Assessment & Plan     Lipid abnormalities are improving with treatment.  Pharmacotherapy as ordered.  Lipids will be reassessed in 6 months.            Low    Class 1 obesity due to excess calories with serious comorbidity and body mass index (BMI) of 34.0 to 34.9 in adult    Overview     Healthy diet and regular exercise encouraged.            Other Visit Diagnoses     History of pneumonia    -  Primary    Sxs resolved, negative exam, negative CXR, She is doing well.     Relevant Orders    XR Chest PA & Lateral (Completed)    Pleural effusion on right        REsolved on CXR 03/10/2021    Costochondritis        Right lateral chest wall, etiology uncertain, begin Tylenol 325 mg every 6 hrs as needed, ROM, and f/u if sxs don't resolve over 1 mo.           Follow Up Instructions Charge Capture  Follow-up Communications  No follow-ups on file.  Patient was given instructions and counseling regarding her condition or for health maintenance advice. Please see specific information pulled into the AVS if appropriate.

## 2021-04-13 ENCOUNTER — TELEPHONE (OUTPATIENT)
Dept: FAMILY MEDICINE CLINIC | Facility: CLINIC | Age: 66
End: 2021-04-13

## 2021-04-13 NOTE — TELEPHONE ENCOUNTER
Caller: Mireya Cueva    Relationship: Self    Best call back number:[phone]     What is the best time to reach you:ASAP  Who are you requesting to speak with (clinical staff, provider,  specific staff member): CLINICAL    Do you know the name of the person who called: MIREYA    What was the call regarding:  WANTS TO KNOW SINCE SHE TOOK THE KAI AND KAI VACCINE 04/06/21 IF SHE IS OK TAKING THE MEDICINE SHE IS ON BECAUSE THEY ARE HAVING TROUBLE WITH BLOOD CLOTS AND THE VACCINE. SHE IS ON ELIQUIS, SOTALOL. SHE ALSO WANTS TO KNOW IF SHE CAN START TAKING HER ALLERGY SHOTS NOW.   Do you require a callback: YES

## 2021-04-13 NOTE — TELEPHONE ENCOUNTER
Spoke with Montserrat ,per Dr Alcazar  you dont need to worry you are on eliquis, so need to worry about blood clots. Dr Alcazar thinks it is ok to re start your allergy injection, may want to check with your allergy office before going in case they  have a rule etc.

## 2021-05-16 DIAGNOSIS — J30.1 CHRONIC ALLERGIC RHINITIS DUE TO POLLEN: Primary | ICD-10-CM

## 2021-05-17 RX ORDER — MONTELUKAST SODIUM 10 MG/1
TABLET ORAL
Qty: 90 TABLET | Refills: 4 | Status: SHIPPED | OUTPATIENT
Start: 2021-05-17 | End: 2022-06-07

## 2021-05-28 ENCOUNTER — TELEPHONE (OUTPATIENT)
Dept: FAMILY MEDICINE CLINIC | Facility: CLINIC | Age: 66
End: 2021-05-28

## 2021-05-28 NOTE — TELEPHONE ENCOUNTER
Caller: Montserrat Cueva    Relationship to patient: Self    Best call back number: [phone]    Chief complaint: PAIN IN RIGHT SIDE AND LOWER BACK, CAN'T SIT OR LAY DOWN    Requested date: TODAY    Additional notes: PATIENT WANTED TO GET IN TO SEE DR AGUAYO TODAY, SHE IS IN A LOT OF PAIN. NO SAME DAYS WITH ANY PROVIDERS, WAS TOLD WE COULD SEND A NOTE OR GO TO ER. SHE DID NOT WANT TO GO TO ER. PLEASE CALL PATIENT IF SHE CAN GET IN TODAY.

## 2021-05-28 NOTE — TELEPHONE ENCOUNTER
Spoke with Montserrat  has been having back, shoulder  x 4 days.No fever, cough, chest pain or any other symptoms. Montserrat  thinks its due to mowing for several hours. She has been using tylenol and seem to help. Offered an appointment today , she wants to see if it gets better id she just does not mow. If not she will arrange an appointment .

## 2021-06-01 ENCOUNTER — HOSPITAL ENCOUNTER (OUTPATIENT)
Dept: GENERAL RADIOLOGY | Facility: HOSPITAL | Age: 66
Discharge: HOME | End: 2021-06-01

## 2021-06-01 ENCOUNTER — TELEPHONE (OUTPATIENT)
Dept: FAMILY MEDICINE CLINIC | Facility: CLINIC | Age: 66
End: 2021-06-01

## 2021-06-01 ENCOUNTER — OFFICE VISIT (OUTPATIENT)
Dept: FAMILY MEDICINE CLINIC | Facility: CLINIC | Age: 66
End: 2021-06-01

## 2021-06-01 VITALS
OXYGEN SATURATION: 96 % | WEIGHT: 206.4 LBS | HEART RATE: 92 BPM | HEIGHT: 67 IN | DIASTOLIC BLOOD PRESSURE: 80 MMHG | BODY MASS INDEX: 32.39 KG/M2 | SYSTOLIC BLOOD PRESSURE: 138 MMHG | TEMPERATURE: 96.2 F | RESPIRATION RATE: 16 BRPM

## 2021-06-01 DIAGNOSIS — E66.09 CLASS 1 OBESITY DUE TO EXCESS CALORIES WITH SERIOUS COMORBIDITY AND BODY MASS INDEX (BMI) OF 34.0 TO 34.9 IN ADULT: ICD-10-CM

## 2021-06-01 DIAGNOSIS — Z85.3 HISTORY OF MALIGNANT NEOPLASM OF FEMALE BREAST: ICD-10-CM

## 2021-06-01 DIAGNOSIS — M54.6 CHRONIC RIGHT-SIDED THORACIC BACK PAIN: ICD-10-CM

## 2021-06-01 DIAGNOSIS — M54.50 LUMBAR BACK PAIN: ICD-10-CM

## 2021-06-01 DIAGNOSIS — Z86.16 HISTORY OF COVID-19: ICD-10-CM

## 2021-06-01 DIAGNOSIS — G89.29 CHRONIC RIGHT-SIDED THORACIC BACK PAIN: ICD-10-CM

## 2021-06-01 DIAGNOSIS — J90 PLEURAL EFFUSION ON RIGHT: Primary | ICD-10-CM

## 2021-06-01 DIAGNOSIS — I48.0 PAROXYSMAL ATRIAL FIBRILLATION (HCC): ICD-10-CM

## 2021-06-01 PROCEDURE — 72110 X-RAY EXAM L-2 SPINE 4/>VWS: CPT

## 2021-06-01 PROCEDURE — 72072 X-RAY EXAM THORAC SPINE 3VWS: CPT

## 2021-06-01 PROCEDURE — 99214 OFFICE O/P EST MOD 30 MIN: CPT | Performed by: FAMILY MEDICINE

## 2021-06-01 NOTE — PROGRESS NOTES
Chief Complaint  Rib    Subjective     CC  Problem List  Visit Diagnosis   Encounters  Notes  Medications  Labs  Result Review Imaging  Media    Montserrat Cueva presents to [facility] for   Montserrat was last seen in office on 03/10/2021 for a follow up on pneumonia that occurred 12/2020 secondary to COVID 19. She had bilateral pneumonia. She had a repeat chest xay in February and March both showing residual inflitrate. . She was have persistent mild SOA .    Pneumonia  There is no chest tightness, cough, frequent throat clearing, hoarse voice, shortness of breath (There is a feelin of fullness in her right lateral chest wall. ) or wheezing. Primary symptoms comments: Pain on right rib area. Episode onset: 02/15/2021. The problem has been unchanged. Pertinent negatives include no appetite change, chest pain, ear congestion, ear pain, fever, headaches, nasal congestion, orthopnea, PND, postnasal drip, sneezing, sore throat or weight loss. She reports no improvement on treatment. Her past medical history is significant for pneumonia.   Atrial Fibrillation  Presents for follow-up (on set was 01/2021 She converted with sotalol. She has had no recurrence. Her cardiac workup was otherwise negative. She is followed with Cardiology, she remians on anticoagulation which she feels is causing fatigue. ) visit. Symptoms are negative for chest pain, dizziness, hypertension, palpitations, shortness of breath (There is a feelin of fullness in her right lateral chest wall. ) and weakness. The symptoms have been stable. Past medical history includes atrial fibrillation.       Review of Systems   Constitutional: Negative for appetite change, fatigue, fever, unexpected weight gain and unexpected weight loss.   HENT: Negative for ear pain, hoarse voice, postnasal drip, sneezing and sore throat.    Respiratory: Negative for apnea, cough, shortness of breath (There is a feelin of fullness in her  "right lateral chest wall. ) and wheezing.    Cardiovascular: Negative for chest pain, palpitations and PND.   Endocrine: Negative for cold intolerance, heat intolerance, polydipsia and polyuria.   Skin: Negative for rash and bruise.   Neurological: Negative for dizziness and weakness.   Hematological: Negative for adenopathy. Does not bruise/bleed easily.        Objective   Vital Signs:   /80 (BP Location: Left arm, Patient Position: Sitting, Cuff Size: Adult)   Pulse 92   Temp 96.2 °F (35.7 °C) (Temporal)   Resp 16   Ht 170.2 cm (67\")   Wt 93.6 kg (206 lb 6.4 oz)   SpO2 96% Comment: Room air  BMI 32.33 kg/m²     Physical Exam  Constitutional:       General: She is not in acute distress.     Appearance: Normal appearance.   HENT:      Mouth/Throat:      Pharynx: No oropharyngeal exudate or posterior oropharyngeal erythema.   Cardiovascular:      Rate and Rhythm: Normal rate and regular rhythm.      Heart sounds: No murmur heard.     Pulmonary:      Effort: Pulmonary effort is normal.      Breath sounds: Normal breath sounds. No wheezing.      Comments: There maybe decreased BS in the Right lower lobe.   Chest:      Chest wall: No tenderness.   Musculoskeletal:      Cervical back: Neck supple.      Thoracic back: Tenderness (over the level of T12) present. No deformity or bony tenderness. Decreased range of motion.      Lumbar back: No deformity or bony tenderness.   Lymphadenopathy:      Cervical: No cervical adenopathy.   Skin:     Findings: No rash.        Result Review :Labs  Result Review  Imaging  Med Tab  Media                 Assessment and Plan CC Problem List  Visit Diagnosis  ROS  Review (Popup)  Health Maintenance  Quality  BestPractice  Medications  SmartSets  SnapShot Encounters  Media  Problem List Items Addressed This Visit        High    Paroxysmal atrial fibrillation (CMS/HCC)    Overview     Onset 01/08/2021  No recurrence she wants to stop anti coagulation will d/c and " begin   mg  She will continue  sotalol She is followed with [facility] her next apt is  07/2021  She understands that there is increase risk of clot off Eliquis.             Low    History of malignant neoplasm of female breast    Overview     stage II, s/p right modified mastectomy 1993         Class 1 obesity due to excess calories with serious comorbidity and body mass index (BMI) of 34.0 to 34.9 in adult    Overview     Healthy diet and regular exercise encouraged         History of COVID-19    Overview     12/2020 with bilateral  Pneumonia.            Other Visit Diagnoses     Pleural effusion on right    -  Primary    CT shows moderate effucsion new from 03/2021. Surgical referral for thoracentsis.     Chronic right-sided thoracic back pain        Relevant Orders    XR Spine Lumbar Complete 4+VW (Completed)    Lumbar back pain        negative exam, negative xray aside from mild DDD, L5-S1    Relevant Orders    XR Spine Thoracic 3 View (Completed)          Follow Up Instructions Charge Capture  Follow-up Communications  No follow-ups on file.  Patient was given instructions and counseling regarding her condition or for health maintenance advice. Please see specific information pulled into the AVS if appropriate.

## 2021-06-01 NOTE — TELEPHONE ENCOUNTER
She stated that you told her to call this am if she is not feeling better and you would work her in today. Please call her back at [phone]

## 2021-06-02 DIAGNOSIS — J90 PLEURAL EFFUSION: Primary | ICD-10-CM

## 2021-06-03 ENCOUNTER — HOSPITAL ENCOUNTER (OUTPATIENT)
Dept: CT IMAGING | Facility: HOSPITAL | Age: 66
Discharge: HOME | End: 2021-06-03
Admitting: FAMILY MEDICINE

## 2021-06-03 DIAGNOSIS — J90 PLEURAL EFFUSION: ICD-10-CM

## 2021-06-03 PROCEDURE — 71250 CT THORAX DX C-: CPT

## 2021-06-04 ENCOUNTER — TELEPHONE (OUTPATIENT)
Dept: FAMILY MEDICINE CLINIC | Facility: CLINIC | Age: 66
End: 2021-06-04

## 2021-06-04 NOTE — TELEPHONE ENCOUNTER
Patient would like a call to have MRI results explained to her. States she had results come through her MyChart & does not understand them at all but is concerned.

## 2021-06-04 NOTE — TELEPHONE ENCOUNTER
Dr Alcazar spoke with Montserrat jackson over report appointment was arranged with Dr Cassi Jamison for 6/17/2021 arrival at 1:15 pm [address]  [facility] [place]

## 2021-06-10 ENCOUNTER — TELEPHONE (OUTPATIENT)
Dept: FAMILY MEDICINE CLINIC | Facility: CLINIC | Age: 66
End: 2021-06-10

## 2021-06-10 NOTE — TELEPHONE ENCOUNTER
Patient needs to speak to you re: getting a new chest xray before going to have her procedure done. She hasn't had pain since she stopped the Eliquis and she believes it is all caused from the medication. Please contact her at . Thanks Naima

## 2021-06-11 NOTE — TELEPHONE ENCOUNTER
Spoke  with  Montserrat   will not need to get another chest xray before seeing the  surgeon for evaluation. She will be looking at CT of chest  for the evaluation.

## 2021-06-17 ENCOUNTER — OFFICE VISIT (OUTPATIENT)
Dept: SURGERY | Facility: CLINIC | Age: 66
End: 2021-06-17

## 2021-06-17 VITALS
TEMPERATURE: 98 F | DIASTOLIC BLOOD PRESSURE: 82 MMHG | HEART RATE: 63 BPM | WEIGHT: 206 LBS | BODY MASS INDEX: 32.33 KG/M2 | HEIGHT: 67 IN | OXYGEN SATURATION: 96 % | SYSTOLIC BLOOD PRESSURE: 151 MMHG

## 2021-06-17 DIAGNOSIS — J90 PLEURAL EFFUSION: Primary | ICD-10-CM

## 2021-06-17 PROCEDURE — 99205 OFFICE O/P NEW HI 60 MIN: CPT | Performed by: THORACIC SURGERY (CARDIOTHORACIC VASCULAR SURGERY)

## 2021-06-17 RX ORDER — ASPIRIN 81 MG/1
81 TABLET ORAL DAILY
COMMUNITY

## 2021-06-18 ENCOUNTER — TELEPHONE (OUTPATIENT)
Dept: SURGERY | Facility: CLINIC | Age: 66
End: 2021-06-18

## 2021-06-18 DIAGNOSIS — J90 PLEURAL EFFUSION: Primary | ICD-10-CM

## 2021-06-18 RX ORDER — DIAZEPAM 2 MG/1
2 TABLET ORAL ONCE
Qty: 1 TABLET | Refills: 0 | Status: SHIPPED | OUTPATIENT
Start: 2021-06-18 | End: 2021-06-18

## 2021-06-18 NOTE — TELEPHONE ENCOUNTER
PATIENT HAS BEEN SCHEDULED FOR CT THORACENTESIS BUT IS VERY NERVOUS ABOUT WHAT TO EXPECT. IR THOUGHT SHE MAY BENEFIT FROM SOMETHING TO HELP WITH ANXIETY THE DAY OF THE PROCEDURE. IR TYPICALLY RECOMMENDS LOW DOSE XANAX OR VALIUM. I HAVE HAD 2 CONVERSATIONS WITH THE PATIENT ABOUT WITH I THINK SHE CAN EXPECT FROM THE PROCEDURE AND SHE IS STILL UNEASY. DO YOU THINK WE CAN SEND HER IN A MEDICATION?

## 2021-06-24 ENCOUNTER — HOSPITAL ENCOUNTER (OUTPATIENT)
Dept: INTERVENTIONAL RADIOLOGY/VASCULAR | Facility: HOSPITAL | Age: 66
Discharge: HOME | End: 2021-06-24

## 2021-06-24 ENCOUNTER — HOSPITAL ENCOUNTER (OUTPATIENT)
Dept: GENERAL RADIOLOGY | Facility: HOSPITAL | Age: 66
Discharge: HOME | End: 2021-06-24

## 2021-06-24 VITALS
DIASTOLIC BLOOD PRESSURE: 68 MMHG | SYSTOLIC BLOOD PRESSURE: 144 MMHG | HEIGHT: 67 IN | OXYGEN SATURATION: 95 % | WEIGHT: 206 LBS | BODY MASS INDEX: 32.33 KG/M2 | HEART RATE: 64 BPM | RESPIRATION RATE: 16 BRPM

## 2021-06-24 DIAGNOSIS — J90 PLEURAL EFFUSION: ICD-10-CM

## 2021-06-24 LAB
APPEARANCE FLD: ABNORMAL
COLOR FLD: ABNORMAL
EOSINOPHIL NFR FLD MANUAL: 8 %
GLUCOSE FLD-MCNC: 79 MG/DL
LDH FLD-CCNC: 233 U/L
LYMPHOCYTES NFR FLD MANUAL: 79 %
METHOD: ABNORMAL
MONOCYTES NFR FLD: 10 %
NEUTROPHILS NFR FLD MANUAL: 3 %
NUC CELL # FLD: 2780 /MM3
PH FLD: 7.5 [PH] (ref 6.5–7.5)
PROT FLD-MCNC: 5.1 G/DL

## 2021-06-24 PROCEDURE — 71045 X-RAY EXAM CHEST 1 VIEW: CPT

## 2021-06-24 PROCEDURE — 76942 ECHO GUIDE FOR BIOPSY: CPT

## 2021-06-24 PROCEDURE — 87205 SMEAR GRAM STAIN: CPT | Performed by: THORACIC SURGERY (CARDIOTHORACIC VASCULAR SURGERY)

## 2021-06-24 PROCEDURE — 88108 CYTOPATH CONCENTRATE TECH: CPT | Performed by: THORACIC SURGERY (CARDIOTHORACIC VASCULAR SURGERY)

## 2021-06-24 PROCEDURE — C1729 CATH, DRAINAGE: HCPCS

## 2021-06-24 PROCEDURE — 83615 LACTATE (LD) (LDH) ENZYME: CPT | Performed by: THORACIC SURGERY (CARDIOTHORACIC VASCULAR SURGERY)

## 2021-06-24 PROCEDURE — 84157 ASSAY OF PROTEIN OTHER: CPT | Performed by: THORACIC SURGERY (CARDIOTHORACIC VASCULAR SURGERY)

## 2021-06-24 PROCEDURE — 87070 CULTURE OTHR SPECIMN AEROBIC: CPT | Performed by: THORACIC SURGERY (CARDIOTHORACIC VASCULAR SURGERY)

## 2021-06-24 PROCEDURE — 89051 BODY FLUID CELL COUNT: CPT | Performed by: THORACIC SURGERY (CARDIOTHORACIC VASCULAR SURGERY)

## 2021-06-24 PROCEDURE — 83986 ASSAY PH BODY FLUID NOS: CPT | Performed by: THORACIC SURGERY (CARDIOTHORACIC VASCULAR SURGERY)

## 2021-06-24 PROCEDURE — 25010000003 LIDOCAINE 1 % SOLUTION: Performed by: RADIOLOGY

## 2021-06-24 PROCEDURE — 82945 GLUCOSE OTHER FLUID: CPT | Performed by: THORACIC SURGERY (CARDIOTHORACIC VASCULAR SURGERY)

## 2021-06-24 RX ORDER — LIDOCAINE HYDROCHLORIDE 10 MG/ML
INJECTION, SOLUTION INFILTRATION; PERINEURAL
Status: COMPLETED | OUTPATIENT
Start: 2021-06-24 | End: 2021-06-24

## 2021-06-24 RX ORDER — CETIRIZINE HYDROCHLORIDE 10 MG/1
10 TABLET ORAL DAILY
COMMUNITY

## 2021-06-24 RX ADMIN — LIDOCAINE HYDROCHLORIDE 5 ML: 10 INJECTION, SOLUTION INFILTRATION; PERINEURAL at 08:16

## 2021-06-24 NOTE — NURSING NOTE
Dr. Carbajal is using ultrasound guidance to snow area on pt's right back for right thoracentesis procedure.

## 2021-06-24 NOTE — NURSING NOTE
Dr. Carbajal placed a 4F drainage catheter into pt's right pleural space and aspirated approx. 30 mL clear, light red pleural fluid. Dr. Carbajal reports pleural fluid is loculated.

## 2021-06-24 NOTE — POST-PROCEDURE NOTE
IR POST OP NOTE    Procedure:US guided R thoracentesis      Pre Op DX:Effusion, R      Post Op DX:Multiloculated effusion      Anesthesia: Local      Findings: Small multiloculated effusion by US. Clear serosanguinous fluid. Limited sample due to septations/loculations.      Complications:No immediate.       Provider Signature: Dr. Junior Carbajal

## 2021-06-24 NOTE — DISCHARGE INSTRUCTIONS
Thoracentesis, Care After  This sheet gives you information about how to care for yourself after your procedure. Your health care provider may also give you more specific instructions. If you have problems or questions, contact your health care provider.  What can I expect after the procedure?  After your procedure, it is common to have some pain at the site where the needle was inserted (puncture site).  Follow these instructions at home:    Care of the puncture site  · Follow instructions from your health care provider about how to take care of your puncture site. Make sure you:  ? Wash your hands with soap and water before you change your bandage (dressing). If soap and water are not available, use hand .  ? Change your dressing as told by your health care provider.  · Check the puncture site every day for signs of infection. Check for:  ? Redness, swelling, or pain.  ? Fluid or blood.  ? Warmth.  ? Pus or a bad smell.  · Do not take baths, swim, or use a hot tub until your health care provider approves.  General instructions  · Take over-the-counter and prescription medicines only as told by your health care provider.  · Do not drive for 24 hours if you were given a medicine to help you relax (sedative) during your procedure.  · Drink enough fluid to keep your urine pale yellow.  · You may return to your normal diet and normal activities as told by your health care provider.  · Keep all follow-up visits as told by your health care provider. This is important.  Contact a health care provider if you:  · Have redness, swelling, or pain at your puncture site.  · Have fluid or blood coming from your puncture site.  · Notice that your puncture site feels warm to the touch.  · Have pus or a bad smell coming from your puncture site.  · Have a fever.  · Have chills.  · Have nausea or vomiting.  · Have trouble breathing.  · Develop a worsening cough.  Get help right away if you:  · Have extreme shortness of  breath.  · Develop chest pain.  · Faint or feel light-headed.  Summary  · After your procedure, it is common to have some pain at the site where the needle was inserted (puncture site).  · Wash your hands with soap and water before you change your bandage (dressing).  · Check your puncture site every day for signs of infection.  · Take over-the-counter and prescription medicines only as told by your health care provider.  This information is not intended to replace advice given to you by your health care provider. Make sure you discuss any questions you have with your health care provider.  Document Revised: 11/30/2018 Document Reviewed: 11/12/2018  Elsevier Patient Education © 2021 [employer]

## 2021-06-24 NOTE — NURSING NOTE
Pt had post procedure CXR that  reviewed. Dr. Carbajal reported no PTX and gave verbal order for pt to be discharged home. Pt was educated on her discharge instructions and voiced understanding. Pt then ambulated with IR RN, independently, to waiting room where she met her  who will be providing her transportation home today. Pt was discharged home in stable condition on room air. Pt remains alert and oriented x3 and denies any pain at procedure site.

## 2021-06-24 NOTE — NURSING NOTE
Dr. Carbajal removed drainage catheter and total of 30 mL pleural fluid removed from pt's right pleural space. Local dressing to be applied.

## 2021-06-25 ENCOUNTER — HOSPITAL ENCOUNTER (OUTPATIENT)
Dept: CT IMAGING | Facility: HOSPITAL | Age: 66
Discharge: HOME | End: 2021-06-25
Admitting: THORACIC SURGERY (CARDIOTHORACIC VASCULAR SURGERY)

## 2021-06-25 DIAGNOSIS — J90 PLEURAL EFFUSION: ICD-10-CM

## 2021-06-25 LAB
LAB AP CASE REPORT: NORMAL
PATH REPORT.FINAL DX SPEC: NORMAL
PATH REPORT.GROSS SPEC: NORMAL

## 2021-06-25 PROCEDURE — 71250 CT THORAX DX C-: CPT

## 2021-06-27 LAB
BACTERIA FLD CULT: NORMAL
GRAM STN SPEC: NORMAL
GRAM STN SPEC: NORMAL

## 2021-07-01 ENCOUNTER — OFFICE VISIT (OUTPATIENT)
Dept: SURGERY | Facility: CLINIC | Age: 66
End: 2021-07-01

## 2021-07-01 VITALS
BODY MASS INDEX: 32.96 KG/M2 | DIASTOLIC BLOOD PRESSURE: 87 MMHG | SYSTOLIC BLOOD PRESSURE: 156 MMHG | TEMPERATURE: 97.5 F | WEIGHT: 210 LBS | OXYGEN SATURATION: 95 % | HEIGHT: 67 IN | HEART RATE: 68 BPM

## 2021-07-01 DIAGNOSIS — J90 PLEURAL EFFUSION: Primary | ICD-10-CM

## 2021-07-01 PROCEDURE — 99213 OFFICE O/P EST LOW 20 MIN: CPT | Performed by: THORACIC SURGERY (CARDIOTHORACIC VASCULAR SURGERY)

## 2021-07-01 NOTE — PROGRESS NOTES
"Chief Complaint  Pleural Effusion (2 week follow up thoracentisis and CT chest )    Subjective          Montserrat Cueva presents to [facility] in followup.   History of Present Illness  Ms. Cueva is a pleasant 65-year-old lady status post COVID-19 infection in December 2020.  After that she is experienced palpitations as well as right-sided back pain.  She was recently found to have a pleural effusion and referred for evaluation.  She denies any significant shortness of breath or dyspnea on exertion.  She has a personal history of breast cancer treated in 1993.  She is a never smoker.  She has no family history of cancer.    She returns today after undergoing a paracentesis.  She tolerated the procedure very well.  She states she is feeling much better.  Objective   Vital Signs:   /87 (BP Location: Left arm, Patient Position: Sitting, Cuff Size: Large Adult)   Pulse 68   Temp 97.5 °F (36.4 °C) (Infrared)   Ht 170.2 cm (67\")   Wt 95.3 kg (210 lb)   SpO2 95%   BMI 32.89 kg/m²     Physical Exam  Vitals and nursing note reviewed.   Constitutional:       Appearance: She is well-developed.   HENT:      Head: Normocephalic and atraumatic.      Nose: Nose normal.   Eyes:      Conjunctiva/sclera: Conjunctivae normal.   Cardiovascular:      Rate and Rhythm: Normal rate.   Pulmonary:      Effort: Pulmonary effort is normal.      Comments: Well-healed thoracentesis incision  Abdominal:      Palpations: Abdomen is soft.   Musculoskeletal:      Cervical back: Neck supple.   Skin:     General: Skin is warm and dry.   Neurological:      Mental Status: She is alert and oriented to person, place, and time.   Psychiatric:         Behavior: Behavior normal.         Thought Content: Thought content normal.         Judgment: Judgment normal.        Result Review :       Data reviewed: Radiologic studies :     I have independently reviewed the CT chest performed on 6/25/2021 which " demonstrates a small right-sided pleural effusion which is improved since her prior CT scan on 6/3/2021.  There is some linear densities in the right middle and upper lobe consistent with atelectasis.  There are no nodules or masses.  There is calcified lymphadenopathy in the hilum and the subcarinal space.  There is no pericardial effusion.     Assessment and Plan      Ms. Cueva is a pleasant 65-year-old lady with a right pleural effusion status post thoracentesis.  Pathology from her thoracentesis demonstrates reactive cells and this is likely related to her COVID-19 infection.  Her CT of the chest has improved significantly from 6/3/2021 to 6/25/2021 even though thoracentesis only obtained 30 cc of fluid.  I expect this will continue to get better over the next few weeks and we will plan to see her again in 3 months with a CT of the chest.  Diagnoses and all orders for this visit:    1. Pleural effusion (Primary)  -     CT Chest Without Contrast; Future      I spent 21 minutes caring for Montserrat on this date of service. This time includes time spent by me in the following activities:preparing for the visit, reviewing tests, obtaining and/or reviewing a separately obtained history, performing a medically appropriate examination and/or evaluation , counseling and educating the patient/family/caregiver, ordering medications, tests, or procedures, referring and communicating with other health care professionals , documenting information in the medical record and independently interpreting results and communicating that information with the patient/family/caregiver  Follow Up   No follow-ups on file.  Patient was given instructions and counseling regarding her condition or for health maintenance advice. Please see specific information pulled into the AVS if appropriate.

## 2021-07-02 ENCOUNTER — OFFICE VISIT (OUTPATIENT)
Dept: CARDIOLOGY | Facility: CLINIC | Age: 66
End: 2021-07-02

## 2021-07-02 VITALS
WEIGHT: 210 LBS | HEART RATE: 61 BPM | DIASTOLIC BLOOD PRESSURE: 81 MMHG | SYSTOLIC BLOOD PRESSURE: 145 MMHG | BODY MASS INDEX: 32.96 KG/M2 | HEIGHT: 67 IN

## 2021-07-02 DIAGNOSIS — I48.0 PAROXYSMAL ATRIAL FIBRILLATION (HCC): ICD-10-CM

## 2021-07-02 DIAGNOSIS — E78.2 MIXED HYPERLIPIDEMIA: Primary | ICD-10-CM

## 2021-07-02 PROCEDURE — 99213 OFFICE O/P EST LOW 20 MIN: CPT | Performed by: INTERNAL MEDICINE

## 2021-07-02 PROCEDURE — 93000 ELECTROCARDIOGRAM COMPLETE: CPT | Performed by: INTERNAL MEDICINE

## 2021-07-28 NOTE — PROGRESS NOTES
"Chief Complaint  Pleural Effusion (New Patient referral Dr. Alcazar )    Subjective          Montserrat Cueva presents to [facility] in consultation.  History of Present Illness  Ms. Cueva is a very pleasant 65-year-old lady with a history of COVID-19 pneumonia in December 2020.  At that time she was started on blood thinner secondary to atrial fibrillation and palpitations.  She has had recurrent right-sided back pain since her COVID-19 pneumonia.  She denies any significant shortness of breath or dyspnea on exertion.    Ms. Cueva has a past history of breast cancer treated with mastectomy in 1993.  She has no smoking history and no family history of cancer.  Objective   Vital Signs:   /82 (BP Location: Left arm, Patient Position: Sitting, Cuff Size: Adult)   Pulse 63   Temp 98 °F (36.7 °C) (Infrared)   Ht 170.2 cm (67\")   Wt 93.4 kg (206 lb)   SpO2 96%   BMI 32.26 kg/m²     Physical Exam  Vitals and nursing note reviewed.   Constitutional:       Appearance: She is well-developed.   HENT:      Head: Normocephalic and atraumatic.      Nose: Nose normal.   Eyes:      Conjunctiva/sclera: Conjunctivae normal.   Cardiovascular:      Rate and Rhythm: Normal rate.   Pulmonary:      Effort: Pulmonary effort is normal.   Abdominal:      Palpations: Abdomen is soft.   Skin:     General: Skin is warm and dry.   Neurological:      Mental Status: She is alert and oriented to person, place, and time.   Psychiatric:         Behavior: Behavior normal.         Thought Content: Thought content normal.         Judgment: Judgment normal.        Result Review :       Data reviewed: Radiologic studies :     I have independently reviewed the CT of the chest performed on 6/3/2021 which demonstrates a moderate-sized right pleural effusion with associated atelectasis.  No lung nodules.  No mediastinal or hilar lymphadenopathy.  No pleural pericardial effusion.     Assessment and Plan      Ms. " Hammad is a very pleasant 65-year-old lady with a history of COVID-19 pneumonia and now a right-sided pleural effusion which I suspect is inflammatory in nature given her history.  I would like to obtain an ultrasound-guided thoracentesis as well as a subsequent CT scan to better evaluate her lung fields.  She will plan to return to the office after she obtains thoracentesis and CT of the chest.  Diagnoses and all orders for this visit:    1. Pleural effusion (Primary)  -     Cancel: Body Fluid Cell Count With Differential - Pleural Fluid, Pleural Cavity; Standing  -     Cancel: pH, Body Fluid - Pleural Fluid, Pleural Cavity; Standing  -     Cancel: Protein, Body Fluid - Pleural Fluid, Pleural Cavity; Standing  -     Cancel: Lactate Dehydrogenase, Body Fluid - Pleural Fluid, Pleural Cavity; Standing  -     Cancel: Glucose, Body Fluid - Pleural Fluid, Pleural Cavity; Standing  -     Cancel: Non-gynecologic Cytology; Standing  -     Cancel: Body Fluid Culture - Body Fluid, Pleural Cavity; Standing  -     Cancel: CT Guided Thoracentesis Right; Future  -     CT Chest Without Contrast; Future  -     US Thoracentesis; Future      I spent 61 minutes caring for Montserrat on this date of service. This time includes time spent by me in the following activities:preparing for the visit, reviewing tests, obtaining and/or reviewing a separately obtained history, performing a medically appropriate examination and/or evaluation , counseling and educating the patient/family/caregiver, ordering medications, tests, or procedures, referring and communicating with other health care professionals  and independently interpreting results and communicating that information with the patient/family/caregiver  Follow Up   No follow-ups on file.  Patient was given instructions and counseling regarding her condition or for health maintenance advice. Please see specific information pulled into the AVS if appropriate.

## 2021-08-05 ENCOUNTER — TELEPHONE (OUTPATIENT)
Dept: FAMILY MEDICINE CLINIC | Facility: CLINIC | Age: 66
End: 2021-08-05

## 2021-08-05 NOTE — TELEPHONE ENCOUNTER
Patient called requesting an appointment in the morning on any day for a rash on her left breast. I offered the 2:30 on Monday and was told she can only come in the morning because she has to get her grandson off the bus. Refused appointment with anyone other than Dr. Alcazar.

## 2021-08-10 ENCOUNTER — OFFICE VISIT (OUTPATIENT)
Dept: FAMILY MEDICINE CLINIC | Facility: CLINIC | Age: 66
End: 2021-08-10

## 2021-08-10 VITALS
RESPIRATION RATE: 18 BRPM | HEIGHT: 67 IN | TEMPERATURE: 97.3 F | BODY MASS INDEX: 33.9 KG/M2 | HEART RATE: 90 BPM | DIASTOLIC BLOOD PRESSURE: 78 MMHG | SYSTOLIC BLOOD PRESSURE: 146 MMHG | WEIGHT: 216 LBS | OXYGEN SATURATION: 98 %

## 2021-08-10 DIAGNOSIS — E66.09 CLASS 1 OBESITY DUE TO EXCESS CALORIES WITH SERIOUS COMORBIDITY AND BODY MASS INDEX (BMI) OF 34.0 TO 34.9 IN ADULT: ICD-10-CM

## 2021-08-10 DIAGNOSIS — L29.9 PRURITUS: ICD-10-CM

## 2021-08-10 DIAGNOSIS — L82.0 INFLAMED SEBORRHEIC KERATOSIS: Primary | ICD-10-CM

## 2021-08-10 PROCEDURE — 11301 SHAVE SKIN LESION 0.6-1.0 CM: CPT | Performed by: FAMILY MEDICINE

## 2021-08-10 NOTE — PROGRESS NOTES
"Chief Complaint  Lesion    Subjective     CC  Problem List  Visit Diagnosis   Encounters  Notes  Medications  Labs  Result Review Imaging  Media    Montserrat Cueva presents to [facility] for   Lesion:   Montserrat Cueva is here today for a lesion. The lesion appeared gradual and has been occurring for years. The course has been stable. The lesion is characterized as no sign of infection. The lesion is located under left breast. The symptoms ha been associated with recurrent physical trama due to location and bleeding.         Review of Systems   Constitutional: Negative for fever.   Respiratory: Negative for shortness of breath.    Cardiovascular: Negative for chest pain and palpitations.   Skin: Positive for skin lesions (There is a lesion under the left breast that has been present for several yrs but has recently increased in size and color.  It is prurutitic).        Objective   Vital Signs:   /78 (BP Location: Left arm, Patient Position: Sitting, Cuff Size: Large Adult)   Pulse 90   Temp 97.3 °F (36.3 °C) (Temporal)   Resp 18   Ht 170.2 cm (67\")   Wt 98 kg (216 lb)   SpO2 98% Comment: Room air  BMI 33.83 kg/m²     Physical Exam  Skin:     Findings: Lesion (There is an 8 mm irregular diry scaly slightly pink inflammed lesion beneath the left breast. ) present.        Result Review :Labs  Result Review  Imaging  Med Tab  Media          Skin Excision    Date/Time: 8/10/2021 2:12 PM  Performed by: Nyla Alcazar MD  Authorized by: Nyla Alcazar MD     Consent:     Consent obtained:  Written    Consent given by:  Patient  Pre-procedure details:     Preparation: Patient was prepped and draped in usual sterile fashion    Anesthesia:     Anesthesia method:  Local infiltration    Local anesthetic:  Lidocaine 2% WITH epi  Procedure details:     Body Area:  Chest (beneath the left breast 8 mm)    Trunk Location:  Left breast  Post-procedure details:     " Patient tolerance of procedure:  Tolerated well, no immediate complications  Comments:      The lesion was cleansed with betadine, anesthestized with 2% lidocaine with epinephrine. The lesion was then shaved under sterile conditions and completely excised. Bleeding was minimal and controlled with cautery. Specimen was sent for pathology. She received wound care precautions.            Assessment and Plan CC Problem List  Visit Diagnosis  ROS  Review (Popup)  Health Maintenance  Quality  BestPractice  Medications  SmartSets  SnapShot Encounters  Media  Problem List Items Addressed This Visit        High    History of malignant neoplasm of female breast - Primary    Overview     stage II, s/p right modified mastectomy 1993            Medium    Mixed hyperlipidemia    Overview     Controlled compliant with meds            Unprioritized    Class 1 obesity due to excess calories with serious comorbidity and body mass index (BMI) of 34.0 to 34.9 in adult    Overview     Healthy diet and regular exercise encouraged               Follow Up Instructions Charge Capture  Follow-up Communications  No follow-ups on file.  Patient was given instructions and counseling regarding her condition or for health maintenance advice. Please see specific information pulled into the AVS if appropriate.

## 2021-08-31 ENCOUNTER — HOSPITAL ENCOUNTER (OUTPATIENT)
Facility: HOSPITAL | Age: 66
Setting detail: OBSERVATION
Discharge: HOME | End: 2021-08-31
Attending: EMERGENCY MEDICINE | Admitting: EMERGENCY MEDICINE

## 2021-08-31 ENCOUNTER — READMISSION MANAGEMENT (OUTPATIENT)
Dept: CALL CENTER | Facility: HOSPITAL | Age: 66
End: 2021-08-31

## 2021-08-31 ENCOUNTER — APPOINTMENT (OUTPATIENT)
Dept: CARDIOLOGY | Facility: HOSPITAL | Age: 66
End: 2021-08-31

## 2021-08-31 ENCOUNTER — APPOINTMENT (OUTPATIENT)
Dept: CT IMAGING | Facility: HOSPITAL | Age: 66
End: 2021-08-31

## 2021-08-31 ENCOUNTER — NURSE TRIAGE (OUTPATIENT)
Dept: CALL CENTER | Facility: HOSPITAL | Age: 66
End: 2021-08-31

## 2021-08-31 VITALS
DIASTOLIC BLOOD PRESSURE: 70 MMHG | HEIGHT: 67 IN | WEIGHT: 211 LBS | BODY MASS INDEX: 33.12 KG/M2 | TEMPERATURE: 97.7 F | RESPIRATION RATE: 20 BRPM | HEART RATE: 65 BPM | SYSTOLIC BLOOD PRESSURE: 123 MMHG | OXYGEN SATURATION: 95 %

## 2021-08-31 DIAGNOSIS — R07.9 CHEST PAIN, UNSPECIFIED TYPE: Primary | ICD-10-CM

## 2021-08-31 DIAGNOSIS — J90 PLEURAL EFFUSION: ICD-10-CM

## 2021-08-31 LAB
ALBUMIN SERPL-MCNC: 4.1 G/DL (ref 3.5–5.2)
ALBUMIN/GLOB SERPL: 1.1 G/DL
ALP SERPL-CCNC: 77 U/L (ref 39–117)
ALT SERPL W P-5'-P-CCNC: 14 U/L (ref 1–33)
ANION GAP SERPL CALCULATED.3IONS-SCNC: 12 MMOL/L (ref 5–15)
APTT PPP: 28.6 SECONDS (ref 24–31)
AST SERPL-CCNC: 18 U/L (ref 1–32)
BASOPHILS # BLD AUTO: 0.1 10*3/MM3 (ref 0–0.2)
BASOPHILS NFR BLD AUTO: 0.6 % (ref 0–1.5)
BILIRUB SERPL-MCNC: 0.2 MG/DL (ref 0–1.2)
BUN SERPL-MCNC: 16 MG/DL (ref 8–23)
BUN/CREAT SERPL: 27.6 (ref 7–25)
CALCIUM SPEC-SCNC: 8.7 MG/DL (ref 8.6–10.5)
CHLORIDE SERPL-SCNC: 104 MMOL/L (ref 98–107)
CO2 SERPL-SCNC: 21 MMOL/L (ref 22–29)
CREAT SERPL-MCNC: 0.58 MG/DL (ref 0.57–1)
D DIMER PPP FEU-MCNC: 0.72 MG/L (FEU) (ref 0–0.59)
DEPRECATED RDW RBC AUTO: 46.4 FL (ref 37–54)
EOSINOPHIL # BLD AUTO: 0.5 10*3/MM3 (ref 0–0.4)
EOSINOPHIL NFR BLD AUTO: 4.3 % (ref 0.3–6.2)
ERYTHROCYTE [DISTWIDTH] IN BLOOD BY AUTOMATED COUNT: 15 % (ref 12.3–15.4)
GFR SERPL CREATININE-BSD FRML MDRD: 104 ML/MIN/1.73
GLOBULIN UR ELPH-MCNC: 3.7 GM/DL
GLUCOSE SERPL-MCNC: 112 MG/DL (ref 65–99)
HCT VFR BLD AUTO: 41.8 % (ref 34–46.6)
HGB BLD-MCNC: 14 G/DL (ref 12–15.9)
HOLD SPECIMEN: NORMAL
INR PPP: 0.96 (ref 0.93–1.1)
LYMPHOCYTES # BLD AUTO: 2.8 10*3/MM3 (ref 0.7–3.1)
LYMPHOCYTES NFR BLD AUTO: 25.8 % (ref 19.6–45.3)
MCH RBC QN AUTO: 29.9 PG (ref 26.6–33)
MCHC RBC AUTO-ENTMCNC: 33.5 G/DL (ref 31.5–35.7)
MCV RBC AUTO: 89.2 FL (ref 79–97)
MONOCYTES # BLD AUTO: 1.1 10*3/MM3 (ref 0.1–0.9)
MONOCYTES NFR BLD AUTO: 10.5 % (ref 5–12)
NEUTROPHILS NFR BLD AUTO: 58.8 % (ref 42.7–76)
NEUTROPHILS NFR BLD AUTO: 6.3 10*3/MM3 (ref 1.7–7)
NRBC BLD AUTO-RTO: 0.1 /100 WBC (ref 0–0.2)
NT-PROBNP SERPL-MCNC: 151.1 PG/ML (ref 0–900)
PLATELET # BLD AUTO: 224 10*3/MM3 (ref 140–450)
PMV BLD AUTO: 9.3 FL (ref 6–12)
POTASSIUM SERPL-SCNC: 3.8 MMOL/L (ref 3.5–5.2)
PROT SERPL-MCNC: 7.8 G/DL (ref 6–8.5)
PROTHROMBIN TIME: 10.7 SECONDS (ref 9.6–11.7)
RBC # BLD AUTO: 4.68 10*6/MM3 (ref 3.77–5.28)
SARS-COV-2 RNA PNL SPEC NAA+PROBE: NOT DETECTED
SODIUM SERPL-SCNC: 137 MMOL/L (ref 136–145)
TROPONIN T SERPL-MCNC: <0.01 NG/ML (ref 0–0.03)
TROPONIN T SERPL-MCNC: <0.01 NG/ML (ref 0–0.03)
WBC # BLD AUTO: 10.7 10*3/MM3 (ref 3.4–10.8)
WHOLE BLOOD HOLD SPECIMEN: NORMAL
WHOLE BLOOD HOLD SPECIMEN: NORMAL

## 2021-08-31 PROCEDURE — 85379 FIBRIN DEGRADATION QUANT: CPT | Performed by: EMERGENCY MEDICINE

## 2021-08-31 PROCEDURE — 87635 SARS-COV-2 COVID-19 AMP PRB: CPT | Performed by: EMERGENCY MEDICINE

## 2021-08-31 PROCEDURE — C9803 HOPD COVID-19 SPEC COLLECT: HCPCS

## 2021-08-31 PROCEDURE — 93306 TTE W/DOPPLER COMPLETE: CPT

## 2021-08-31 PROCEDURE — 84484 ASSAY OF TROPONIN QUANT: CPT | Performed by: EMERGENCY MEDICINE

## 2021-08-31 PROCEDURE — 85610 PROTHROMBIN TIME: CPT | Performed by: EMERGENCY MEDICINE

## 2021-08-31 PROCEDURE — G0378 HOSPITAL OBSERVATION PER HR: HCPCS

## 2021-08-31 PROCEDURE — 85025 COMPLETE CBC W/AUTO DIFF WBC: CPT | Performed by: EMERGENCY MEDICINE

## 2021-08-31 PROCEDURE — 93005 ELECTROCARDIOGRAM TRACING: CPT | Performed by: EMERGENCY MEDICINE

## 2021-08-31 PROCEDURE — 71275 CT ANGIOGRAPHY CHEST: CPT

## 2021-08-31 PROCEDURE — 80053 COMPREHEN METABOLIC PANEL: CPT | Performed by: EMERGENCY MEDICINE

## 2021-08-31 PROCEDURE — 84484 ASSAY OF TROPONIN QUANT: CPT | Performed by: NURSE PRACTITIONER

## 2021-08-31 PROCEDURE — 93306 TTE W/DOPPLER COMPLETE: CPT | Performed by: INTERNAL MEDICINE

## 2021-08-31 PROCEDURE — 83880 ASSAY OF NATRIURETIC PEPTIDE: CPT | Performed by: EMERGENCY MEDICINE

## 2021-08-31 PROCEDURE — 0 IOPAMIDOL PER 1 ML: Performed by: EMERGENCY MEDICINE

## 2021-08-31 PROCEDURE — 99214 OFFICE O/P EST MOD 30 MIN: CPT | Performed by: INTERNAL MEDICINE

## 2021-08-31 PROCEDURE — 99284 EMERGENCY DEPT VISIT MOD MDM: CPT

## 2021-08-31 PROCEDURE — 85730 THROMBOPLASTIN TIME PARTIAL: CPT | Performed by: EMERGENCY MEDICINE

## 2021-08-31 RX ORDER — ISOSORBIDE MONONITRATE 30 MG/1
30 TABLET, EXTENDED RELEASE ORAL
Status: DISCONTINUED | OUTPATIENT
Start: 2021-08-31 | End: 2021-08-31 | Stop reason: HOSPADM

## 2021-08-31 RX ORDER — ACETAMINOPHEN 160 MG/5ML
650 SOLUTION ORAL EVERY 4 HOURS PRN
Status: DISCONTINUED | OUTPATIENT
Start: 2021-08-31 | End: 2021-08-31 | Stop reason: HOSPADM

## 2021-08-31 RX ORDER — ACETAMINOPHEN 650 MG/1
650 SUPPOSITORY RECTAL EVERY 4 HOURS PRN
Status: DISCONTINUED | OUTPATIENT
Start: 2021-08-31 | End: 2021-08-31 | Stop reason: HOSPADM

## 2021-08-31 RX ORDER — ONDANSETRON 2 MG/ML
4 INJECTION INTRAMUSCULAR; INTRAVENOUS EVERY 6 HOURS PRN
Status: DISCONTINUED | OUTPATIENT
Start: 2021-08-31 | End: 2021-08-31 | Stop reason: HOSPADM

## 2021-08-31 RX ORDER — ACETAMINOPHEN 325 MG/1
650 TABLET ORAL EVERY 4 HOURS PRN
Status: DISCONTINUED | OUTPATIENT
Start: 2021-08-31 | End: 2021-08-31 | Stop reason: HOSPADM

## 2021-08-31 RX ORDER — ASPIRIN 81 MG/1
324 TABLET, CHEWABLE ORAL ONCE
Status: COMPLETED | OUTPATIENT
Start: 2021-08-31 | End: 2021-08-31

## 2021-08-31 RX ORDER — ASPIRIN 81 MG/1
81 TABLET ORAL DAILY
Status: DISCONTINUED | OUTPATIENT
Start: 2021-09-01 | End: 2021-08-31 | Stop reason: HOSPADM

## 2021-08-31 RX ORDER — SODIUM CHLORIDE 0.9 % (FLUSH) 0.9 %
10 SYRINGE (ML) INJECTION AS NEEDED
Status: DISCONTINUED | OUTPATIENT
Start: 2021-08-31 | End: 2021-08-31 | Stop reason: HOSPADM

## 2021-08-31 RX ORDER — CETIRIZINE HYDROCHLORIDE 10 MG/1
10 TABLET ORAL DAILY
Status: DISCONTINUED | OUTPATIENT
Start: 2021-08-31 | End: 2021-08-31 | Stop reason: HOSPADM

## 2021-08-31 RX ORDER — IBUPROFEN 400 MG/1
400 TABLET ORAL EVERY 6 HOURS PRN
Status: DISCONTINUED | OUTPATIENT
Start: 2021-08-31 | End: 2021-08-31 | Stop reason: HOSPADM

## 2021-08-31 RX ORDER — ROSUVASTATIN CALCIUM 5 MG/1
5 TABLET, COATED ORAL DAILY
Status: DISCONTINUED | OUTPATIENT
Start: 2021-08-31 | End: 2021-08-31 | Stop reason: HOSPADM

## 2021-08-31 RX ORDER — MONTELUKAST SODIUM 10 MG/1
10 TABLET ORAL NIGHTLY
Status: DISCONTINUED | OUTPATIENT
Start: 2021-08-31 | End: 2021-08-31 | Stop reason: HOSPADM

## 2021-08-31 RX ORDER — SODIUM CHLORIDE 0.9 % (FLUSH) 0.9 %
10 SYRINGE (ML) INJECTION EVERY 12 HOURS SCHEDULED
Status: DISCONTINUED | OUTPATIENT
Start: 2021-08-31 | End: 2021-08-31 | Stop reason: HOSPADM

## 2021-08-31 RX ORDER — ONDANSETRON 4 MG/1
4 TABLET, FILM COATED ORAL EVERY 6 HOURS PRN
Status: DISCONTINUED | OUTPATIENT
Start: 2021-08-31 | End: 2021-08-31 | Stop reason: HOSPADM

## 2021-08-31 RX ADMIN — IOPAMIDOL 100 ML: 755 INJECTION, SOLUTION INTRAVENOUS at 05:28

## 2021-08-31 RX ADMIN — IBUPROFEN 400 MG: 400 TABLET ORAL at 10:17

## 2021-08-31 RX ADMIN — METOPROLOL TARTRATE 12.5 MG: 25 TABLET, FILM COATED ORAL at 10:17

## 2021-08-31 RX ADMIN — Medication 10 ML: at 10:17

## 2021-08-31 RX ADMIN — ASPIRIN 324 MG: 81 TABLET, CHEWABLE ORAL at 06:17

## 2021-08-31 RX ADMIN — ISOSORBIDE MONONITRATE 30 MG: 30 TABLET, EXTENDED RELEASE ORAL at 10:17

## 2021-08-31 RX ADMIN — IBUPROFEN 400 MG: 400 TABLET ORAL at 19:43

## 2021-09-01 ENCOUNTER — TRANSITIONAL CARE MANAGEMENT TELEPHONE ENCOUNTER (OUTPATIENT)
Dept: CALL CENTER | Facility: HOSPITAL | Age: 66
End: 2021-09-01

## 2021-09-01 ENCOUNTER — TELEPHONE (OUTPATIENT)
Dept: SURGERY | Facility: CLINIC | Age: 66
End: 2021-09-01

## 2021-09-01 LAB — QT INTERVAL: 361 MS

## 2021-09-01 NOTE — DISCHARGE SUMMARY
[facility]  DISCHARGE SUMMARY        Prepared For PCP:  Nyla Alcazar MD    Patient Name: Montserrat Cueva  : 1955  MRN: [id number]      Date of Admission:   2021    Date of Discharge:  2021    Length of stay:  LOS: 0 days     Hospital Course     Presenting Problem:   Chest pain [R07.9]  Pleural effusion [J90]  Chest pain, unspecified type [R07.9]      Active Hospital Problems    Diagnosis  POA   • Chest pain [R07.9]  Yes   • Pleural effusion [J90]  Yes      Resolved Hospital Problems   No resolved problems to display.           Hospital Course:   Montserrat Cueva is a 65 y.o. female presented to ER with chest pain. MI ruled out with Troponin T negative x2. CT chest without PE, but noted bilateral small pleural fusion with improvement of right sided and development of left from prior exam. ECHO was stable. She was seen by cardiology with plan for 6 week outpatient follow up. The pain was conssitant with pleuritic pain, likely from plural effusion. Fluid analysis from recent right effusion was negative for malignancy and bacteria.  Patient had Covid pneumonia in . She will be cent home with incentive spirometry and out patient follow up with PCP and cardiology. Nsaid for pain and inflammation.       Recommendation for Outpatient Providers:             Reasons For Change In Medications and Indications for New Medications:        Day of Discharge     HPI: 65-year-old female presents to the ER with a chief complaint of left shoulder pain with progression to left rib pain which started yesterday morning and progressively worsened.  The pain is exacerbated by deep breath.  There are no alleviating factors.  The patient denies subjective fever chills, cough, or sputum production.  The patient had a history of COVID-19 pneumonia in 2020 and noted right pleural effusion with drainage of 30 mL in 2021.  CT evidence on this admission shows that the right pleural  effusion is resolving but patient has had spontaneous development of left pleural effusion.  Pleural fluid evaluation did not show any bacteria and had no evidence of malignancy.     The patient does have a history of breast cancer in 1993 with right mastectomy and chemotherapy, tamoxifen x5 years.  The patient did not have radiation therapy.            Vital Signs:   Temp:  [97.7 °F (36.5 °C)-98.2 °F (36.8 °C)] 97.7 °F (36.5 °C)  Heart Rate:  [64-81] 65  Resp:  [18-20] 20  BP: (109-171)/(64-85) 123/70     ROS:  Review of Systems   Cardiovascular: Negative for leg swelling.   Respiratory: Negative for cough.         Difficulty taking a deep breath.    Gastrointestinal: Negative for nausea.   All other systems reviewed and are negative.       Physical Exam  Vitals and nursing note reviewed.   Constitutional:       Appearance: Normal appearance. She is not ill-appearing.   HENT:      Head: Normocephalic and atraumatic.      Right Ear: External ear normal.      Left Ear: External ear normal.      Nose: Nose normal. No congestion or rhinorrhea.      Mouth/Throat:      Mouth: Mucous membranes are moist.   Eyes:      General: No scleral icterus.        Right eye: No discharge.         Left eye: No discharge.      Extraocular Movements: Extraocular movements intact.      Conjunctiva/sclera: Conjunctivae normal.      Pupils: Pupils are equal, round, and reactive to light.   Cardiovascular:      Rate and Rhythm: Normal rate and regular rhythm.      Pulses: Normal pulses.      Heart sounds: Normal heart sounds. No murmur heard.     Pulmonary:      Effort: Pulmonary effort is normal. No respiratory distress.      Breath sounds: Normal breath sounds. No wheezing or rhonchi.   Abdominal:      General: Bowel sounds are normal.      Palpations: Abdomen is soft.   Musculoskeletal:         General: Normal range of motion.      Cervical back: Normal range of motion and neck supple.      Right lower leg: No edema.      Left lower leg:  No edema.      Comments: No lower extremity edema appreciated    Skin:     General: Skin is warm and dry.      Capillary Refill: Capillary refill takes less than 2 seconds.   Neurological:      General: No focal deficit present.      Mental Status: She is alert and oriented to person, place, and time.   Psychiatric:         Mood and Affect: Mood normal.         Behavior: Behavior normal.         Thought Content: Thought content normal.         Judgment: Judgment normal.     Pertinent  and/or Most Recent Results     Results from last 7 days   Lab Units 08/31/21  0408   WBC 10*3/mm3 10.70   HEMOGLOBIN g/dL 14.0   HEMATOCRIT % 41.8   PLATELETS 10*3/mm3 224   SODIUM mmol/L 137   POTASSIUM mmol/L 3.8   CHLORIDE mmol/L 104   CO2 mmol/L 21.0*   BUN mg/dL 16   CREATININE mg/dL 0.58   GLUCOSE mg/dL 112*   CALCIUM mg/dL 8.7     Results from last 7 days   Lab Units 08/31/21  0408   BILIRUBIN mg/dL 0.2   ALK PHOS U/L 77   ALT (SGPT) U/L 14   AST (SGOT) U/L 18   PROTIME Seconds 10.7   INR  0.96   APTT seconds 28.6           Invalid input(s): TG, LDLCALC, LDLREALC  Results from last 7 days   Lab Units 08/31/21  0903 08/31/21  0408   PROBNP pg/mL  --  151.1   TROPONIN T ng/mL <0.010 <0.010       Brief Urine Lab Results  (Last result in the past 365 days)      Color   Clarity   Blood   Leuk Est   Nitrite   Protein   CREAT   Urine HCG        11/03/20 1019 Yellow Clear Negative Moderate (2+) Negative Negative               Microbiology Results Abnormal     Procedure Component Value - Date/Time    COVID PRE-OP / PRE-PROCEDURE SCREENING ORDER (NO ISOLATION) - Swab, Nasopharynx [[id number]]  (Normal) Collected: 08/31/21 0610    Lab Status: Final result Specimen: Swab from Nasopharynx Updated: 08/31/21 0647    Narrative:      The following orders were created for panel order COVID PRE-OP / PRE-PROCEDURE SCREENING ORDER (NO ISOLATION) - Swab, Nasopharynx.  Procedure                               Abnormality         Status                      ---------                               -----------         ------                     COVID-19,CEPHEID/KENRICK/BD...[[id number]]  Normal              Final result                 Please view results for these tests on the individual orders.    COVID-19,CEPHEID/KENRICK/BDMAX,COR/[facility]/PAD/CHENTE IN-HOUSE(OR EMERGENT/ADD-ON),NP SWAB IN TRANSPORT MEDIA 3-4 HR TAT, RT-PCR - Swab, Nasopharynx [[id number]]  (Normal) Collected: 08/31/21 0610    Lab Status: Final result Specimen: Swab from Nasopharynx Updated: 08/31/21 0647     COVID19 Not Detected    Narrative:      Fact sheet for providers: https://www.fda.gov/media/887773/download     Fact sheet for patients: https://www.fda.gov/media/376901/download  Fact sheet for providers: https://www.fda.gov/media/277721/download    Fact sheet for patients: https://www.fda.gov/media/972236/download    Test performed by PCR.          CT Chest Pulmonary Embolism    Result Date: 8/31/2021  Impression: 1.  No evidence of pulmonary embolus. 2.  Bilateral trace pleural effusions with associated atelectasis. Left side is new compared to 1/25/2021. The right side is decreased in size compared to 1/25/2021. 3.  Similar prominent caliber of the pulmonary trunk as can be seen with pulmonary arterial hypertension. Slot 66 Electronically signed by:  Luigi Sykes D.O.  8/31/2021 3:48 AM              Results for orders placed during the hospital encounter of 01/12/21    Adult Transthoracic Echo Complete W/ Cont if Necessary Per Protocol    Interpretation Summary  · Estimated left ventricular EF was in agreement with the calculated left ventricular EF. Left ventricular ejection fraction appears to be 61 - 65%. Left ventricular systolic function is normal.  · Left ventricular wall thickness is consistent with concentric hypertrophy.  · There is mainly affecting the non-coronary, left coronary and right coronary cusp(s).              Test Results Pending at Discharge        Procedures Performed            Consults:   Consults     Date and Time Order Name Status Description    8/31/2021  6:06 AM Inpatient Cardiology Consult Completed             Discharge Details        Discharge Medications      Continue These Medications      Instructions Start Date   aspirin 81 MG EC tablet   81 mg, Oral, Daily      cetirizine 10 MG tablet  Commonly known as: zyrTEC   10 mg, Oral, Daily      EPINEPHrine 0.3 MG/0.3ML solution auto-injector injection  Commonly known as: EPIPEN   0.3 mL, Intramuscular, As Needed      montelukast 10 MG tablet  Commonly known as: SINGULAIR   TAKE 1 TABLET BY MOUTH ONCE DAILY AT NIGHT      multivitamin with minerals tablet tablet   1 tablet, Oral, Daily      rosuvastatin 5 MG tablet  Commonly known as: CRESTOR   5 mg, Oral, Daily      Vitamin C 500 MG capsule   1 capsule, Oral, Daily             No Known Allergies      Discharge Disposition:  Home or Self Care    Diet:  Hospital:No active diet order        Discharge Activity:   Activity Instructions     Activity as tolerated               CODE STATUS:    Code Status and Medical Interventions:   Ordered at: 08/31/21 1204     Code Status:    CPR     Medical Interventions (Level of Support Prior to Arrest):    Full         Follow-up Appointments  Future Appointments   Date Time Provider Department Center   9/20/2021  9:00 AM ALANNAH CORYDON CT 1 [facility]   10/5/2021  1:00 PM Candelaria Ellis DNP, [facility]   4/1/2022  9:00 AM Amilcar Raya MD [facility]             Condition on Discharge:      Stable      This patient has been examined wearing appropriate Personal Protective Equipment. 09/01/21      Electronically signed by SHIRLEY Urbina, 09/01/21, 5:39 AM EDT.      Time: I spent  60  minutes on this discharge activity which included face-to-face encounter with the patient/reviewing the data in the system/coordination of the care with the nursing staff as well as consultants/documentation/entering orders.

## 2021-09-01 NOTE — TELEPHONE ENCOUNTER
Patient called and left a VM that she was in the ER and had a ct chest done. She called to cancel her upcoming CT chest 9-2-21. She is scheduled for a follow up with Candelaria Ellis 10-5-21. Do you want to see her sooner or move her appt back and see her again with another CT chest?

## 2021-09-01 NOTE — OUTREACH NOTE
Call Center TCM Note      Responses   [facility] patient discharged from?  [facility]   Does the patient have one of the following disease processes/diagnoses(primary or secondary)?  Other   TCM attempt successful?  Yes   Discharge diagnosis  Atypical chest pain,    Small left pleural effusion   Meds reviewed with patient/caregiver?  Yes   Is the patient having any side effects they believe may be caused by any medication additions or changes?  No   Does the patient have all medications ordered at discharge?  N/A   Prescription comments  NO CHANGES MADE TO PT REGULAR MEDICATIONS   Does the patient have a primary care provider?   Yes   Does the patient have an appointment with their PCP within 7 days of discharge?  No   Comments regarding PCP  Pt declines assist in sched TCM [facility]. States Dr Alcazar is wonderful, and pt can speak with her whenever she needs. She will call for appt.    Has the patient kept scheduled appointments due by today?  N/A   Has home health visited the patient within 72 hours of discharge?  N/A   Psychosocial issues?  No   Did the patient receive a copy of their discharge instructions?  Yes   Nursing interventions  Reviewed instructions with patient   What is the patient's perception of their health status since discharge?  Improving   Is the patient/caregiver able to teach back signs and symptoms related to disease process for when to call PCP?  Yes   Is the patient/caregiver able to teach back signs and symptoms related to disease process for when to call 911?  Yes   Is the patient/caregiver able to teach back the hierarchy of who to call/visit for symptoms/problems? PCP, Specialist, Home health nurse, Urgent Care, ED, 911  Yes   If the patient is a current smoker, are they able to teach back resources for cessation?  Not a smoker   TCM call completed?  Yes   Wrap up additional comments  Pt doing ok, still some pain at area of pleural effusion. Pt using ice and heat. No SOB. No questions at  this time. Pt will see C/T sgn in 10/2021. Pt declines assist in sched TCM FWP. States Dr Alcazar is wonderful, and pt can speak with her whenever she needs. She will call for appt.           Pavithra Ramirez MA    9/1/2021, 14:50 EDT

## 2021-09-03 LAB
BH CV ECHO MEAS - % IVS THICK: 22.9 %
BH CV ECHO MEAS - % LVPW THICK: 30.7 %
BH CV ECHO MEAS - ACS: 2 CM
BH CV ECHO MEAS - AO MAX PG (FULL): 1.3 MMHG
BH CV ECHO MEAS - AO MAX PG: 4.7 MMHG
BH CV ECHO MEAS - AO MEAN PG (FULL): 0.74 MMHG
BH CV ECHO MEAS - AO MEAN PG: 2.7 MMHG
BH CV ECHO MEAS - AO ROOT AREA (BSA CORRECTED): 1.6
BH CV ECHO MEAS - AO ROOT AREA: 8.4 CM^2
BH CV ECHO MEAS - AO ROOT DIAM: 3.3 CM
BH CV ECHO MEAS - AO V2 MAX: 108.5 CM/SEC
BH CV ECHO MEAS - AO V2 MEAN: 78.3 CM/SEC
BH CV ECHO MEAS - AO V2 VTI: 24.1 CM
BH CV ECHO MEAS - ASC AORTA: 2.8 CM
BH CV ECHO MEAS - AVA(I,A): 3.2 CM^2
BH CV ECHO MEAS - AVA(I,D): 3.2 CM^2
BH CV ECHO MEAS - AVA(V,A): 3.1 CM^2
BH CV ECHO MEAS - AVA(V,D): 3.1 CM^2
BH CV ECHO MEAS - BSA(HAYCOCK): 2.2 M^2
BH CV ECHO MEAS - BSA: 2.1 M^2
BH CV ECHO MEAS - BZI_BMI: 33 KILOGRAMS/M^2
BH CV ECHO MEAS - BZI_METRIC_HEIGHT: 170.2 CM
BH CV ECHO MEAS - BZI_METRIC_WEIGHT: 95.7 KG
BH CV ECHO MEAS - EDV(CUBED): 157.1 ML
BH CV ECHO MEAS - EDV(MOD-SP4): 78 ML
BH CV ECHO MEAS - EDV(TEICH): 141.1 ML
BH CV ECHO MEAS - EF(CUBED): 70.6 %
BH CV ECHO MEAS - EF(MOD-BP): 55 %
BH CV ECHO MEAS - EF(MOD-SP4): 55.5 %
BH CV ECHO MEAS - EF(TEICH): 61.7 %
BH CV ECHO MEAS - ESV(CUBED): 46.2 ML
BH CV ECHO MEAS - ESV(MOD-SP4): 34.7 ML
BH CV ECHO MEAS - ESV(TEICH): 54 ML
BH CV ECHO MEAS - FS: 33.5 %
BH CV ECHO MEAS - IVS/LVPW: 1.1
BH CV ECHO MEAS - IVSD: 1.2 CM
BH CV ECHO MEAS - IVSS: 1.5 CM
BH CV ECHO MEAS - LA DIMENSION(2D): 3.6 CM
BH CV ECHO MEAS - LV DIASTOLIC VOL/BSA (35-75): 37.7 ML/M^2
BH CV ECHO MEAS - LV MASS(C)D: 248.4 GRAMS
BH CV ECHO MEAS - LV MASS(C)DI: 120.1 GRAMS/M^2
BH CV ECHO MEAS - LV MASS(C)S: 190.1 GRAMS
BH CV ECHO MEAS - LV MASS(C)SI: 91.9 GRAMS/M^2
BH CV ECHO MEAS - LV MAX PG: 3.4 MMHG
BH CV ECHO MEAS - LV MEAN PG: 2 MMHG
BH CV ECHO MEAS - LV SYSTOLIC VOL/BSA (12-30): 16.8 ML/M^2
BH CV ECHO MEAS - LV V1 MAX: 92.1 CM/SEC
BH CV ECHO MEAS - LV V1 MEAN: 66.4 CM/SEC
BH CV ECHO MEAS - LV V1 VTI: 20.9 CM
BH CV ECHO MEAS - LVIDD: 5.4 CM
BH CV ECHO MEAS - LVIDS: 3.6 CM
BH CV ECHO MEAS - LVOT AREA: 3.7 CM^2
BH CV ECHO MEAS - LVOT DIAM: 2.2 CM
BH CV ECHO MEAS - LVPWD: 1.1 CM
BH CV ECHO MEAS - LVPWS: 1.5 CM
BH CV ECHO MEAS - MV A MAX VEL: 73.6 CM/SEC
BH CV ECHO MEAS - MV DEC SLOPE: 257 CM/SEC^2
BH CV ECHO MEAS - MV DEC TIME: 0.25 SEC
BH CV ECHO MEAS - MV E MAX VEL: 31.6 CM/SEC
BH CV ECHO MEAS - MV E/A: 0.43
BH CV ECHO MEAS - MV MAX PG: 2.7 MMHG
BH CV ECHO MEAS - MV MEAN PG: 1.1 MMHG
BH CV ECHO MEAS - MV V2 MAX: 82.1 CM/SEC
BH CV ECHO MEAS - MV V2 MEAN: 49.1 CM/SEC
BH CV ECHO MEAS - MV V2 VTI: 21.6 CM
BH CV ECHO MEAS - MVA(VTI): 3.6 CM^2
BH CV ECHO MEAS - PA ACC TIME: 0.12 SEC
BH CV ECHO MEAS - PA MAX PG (FULL): 0.79 MMHG
BH CV ECHO MEAS - PA MAX PG: 2.3 MMHG
BH CV ECHO MEAS - PA MEAN PG (FULL): 0.66 MMHG
BH CV ECHO MEAS - PA MEAN PG: 1.3 MMHG
BH CV ECHO MEAS - PA PR(ACCEL): 22.9 MMHG
BH CV ECHO MEAS - PA V2 MAX: 76.2 CM/SEC
BH CV ECHO MEAS - PA V2 MEAN: 55.4 CM/SEC
BH CV ECHO MEAS - PA V2 VTI: 16.9 CM
BH CV ECHO MEAS - PULM A REVS DUR: 0.08 SEC
BH CV ECHO MEAS - PULM A REVS VEL: 27.8 CM/SEC
BH CV ECHO MEAS - PULM DIAS VEL: 28 CM/SEC
BH CV ECHO MEAS - PULM S/D: 1.6
BH CV ECHO MEAS - PULM SYS VEL: 44.3 CM/SEC
BH CV ECHO MEAS - RAP SYSTOLE: 8 MMHG
BH CV ECHO MEAS - RV MAX PG: 1.5 MMHG
BH CV ECHO MEAS - RV MEAN PG: 0.68 MMHG
BH CV ECHO MEAS - RV V1 MAX: 61.9 CM/SEC
BH CV ECHO MEAS - RV V1 MEAN: 38.7 CM/SEC
BH CV ECHO MEAS - RV V1 VTI: 13.3 CM
BH CV ECHO MEAS - RVDD: 2.5 CM
BH CV ECHO MEAS - SI(AO): 97.5 ML/M^2
BH CV ECHO MEAS - SI(CUBED): 53.6 ML/M^2
BH CV ECHO MEAS - SI(LVOT): 37.4 ML/M^2
BH CV ECHO MEAS - SI(MOD-SP4): 20.9 ML/M^2
BH CV ECHO MEAS - SI(TEICH): 42.1 ML/M^2
BH CV ECHO MEAS - SV(AO): 201.7 ML
BH CV ECHO MEAS - SV(CUBED): 111 ML
BH CV ECHO MEAS - SV(LVOT): 77.3 ML
BH CV ECHO MEAS - SV(MOD-SP4): 43.2 ML
BH CV ECHO MEAS - SV(TEICH): 87.1 ML
MAXIMAL PREDICTED HEART RATE: 154 BPM
STRESS TARGET HR: 131 BPM

## 2021-09-07 ENCOUNTER — TELEPHONE (OUTPATIENT)
Dept: FAMILY MEDICINE CLINIC | Facility: CLINIC | Age: 66
End: 2021-09-07

## 2021-09-09 ENCOUNTER — OFFICE VISIT (OUTPATIENT)
Dept: SURGERY | Facility: CLINIC | Age: 66
End: 2021-09-09

## 2021-09-09 VITALS
TEMPERATURE: 98.4 F | HEIGHT: 67 IN | BODY MASS INDEX: 32.96 KG/M2 | OXYGEN SATURATION: 95 % | DIASTOLIC BLOOD PRESSURE: 83 MMHG | SYSTOLIC BLOOD PRESSURE: 155 MMHG | HEART RATE: 77 BPM | WEIGHT: 210 LBS

## 2021-09-09 DIAGNOSIS — J90 BILATERAL PLEURAL EFFUSION: Primary | ICD-10-CM

## 2021-09-09 DIAGNOSIS — J90 PLEURAL EFFUSION: ICD-10-CM

## 2021-09-09 PROCEDURE — 99214 OFFICE O/P EST MOD 30 MIN: CPT | Performed by: NURSE PRACTITIONER

## 2021-09-09 NOTE — PROGRESS NOTES
"Chief Complaint  Follow up, pleural effusion    Subjective          Montserrat Cueva presents to [facility] in follow up for pleural effusion    History of Present Illness    Ms. Cueva is a pleasant 61-year-old lady who presents today to review her most recent CT of the chest.  She has a history of COVID-19 pneumonia diagnosed in December 2020.  She was found to have a right pleural effusion in 1/20/2021 and was referred for further evaluation.     A right-sided thoracentesis was performed on 6/24/2021 and yielded 30 cc of serosanguineous fluid.  She reported improvement in symptoms.  Fluid was found to be negative for malignancy and bacteria.    Of note, patient was scheduled to be seen in our office in October to review her chest CT.  However, she presented to [facility] ER on 8/31/2021 for left-sided chest wall pain.  A CTA was performed and was negative for pulmonary embolus and demonstrated small bilateral pleural effusions. Cardiac echo was stable.  She was sent home with incentive spirometry and NSAID.    Patient is a never smoker.  She does not require any supplemental oxygen at baseline.  She is able to perform her daily activities without difficulty. Her only complaint today is some residual anterior left sided chest pain under her breast. She tells me she recently had a spot in this area \"burned off\" 3 weeks ago by her PCP. The left sided chest pain improves with ibuprofen, which she has been taking q8h.     Objective   Vital Signs:   /83   Pulse 77   Temp 98.4 °F (36.9 °C) (Infrared)   Ht 170.2 cm (67\")   Wt 95.3 kg (210 lb)   SpO2 95%   BMI 32.89 kg/m²     Physical Exam  Constitutional:       Appearance: Normal appearance. She is not ill-appearing.   HENT:      Head: Normocephalic and atraumatic.   Cardiovascular:      Rate and Rhythm: Normal rate.   Pulmonary:      Effort: Pulmonary effort is normal. No respiratory distress.   Musculoskeletal:         " General: Normal range of motion.      Cervical back: Normal range of motion and neck supple.   Skin:     General: Skin is warm and dry.      Findings: Erythema present.             Comments: 1 cm area of erythema below the left breast as above   Neurological:      General: No focal deficit present.      Mental Status: She is alert and oriented to person, place, and time.   Psychiatric:         Mood and Affect: Mood normal.         Behavior: Behavior normal.          Result Review :{Labs  Result Review  Imaging  Med Tab  Media  Procedures :23}                  I have independently reviewed the CT angiogram of the chest performed on 8/31/2021 which demonstrates small bilateral pleural effusions, right slightly larger than left. There is no acute cardiopulmonary process. There is no mediastinal lymphadenopathy. No concerning pulmonary nodules or infiltrates.    Assessment and Plan    Diagnoses and all orders for this visit:    1. Bilateral pleural effusion (Primary)  -     CT Chest Without Contrast; Future    2. Pleural effusion  -     CT Chest Without Contrast; Future      Ms. Cueva presents today without any new pulmonary complaints.  Her most recent CT of the chest demonstrates trace lateral pleural effusions, right greater than left.  Discussed with Dr. Jamison, who agrees there is not enough fluid to order another thoracentesis at this time.  Recommend the patient to perform aggressive pulmonary care including incentive spirometry 10 times per hour and continue NSAID therapy for 2-3 more weeks.  Advised to continue to stay active and avoid sedentary activities.  This was discussed with the patient at length.  We will plan to follow-up with her in 6 months with another CT of the chest followed by an office visit to review the findings. For the left anterior chest/breast pain, advised to apply heat and ice as needed and follow up with PCP as scheduled. Suspect there is some residual pleurisy secondary to the  small amount of pleural fluid.    I spent 34 minutes caring for Montserrat on this date of service. This time includes time spent by me in the following activities:preparing for the visit, reviewing tests, obtaining and/or reviewing a separately obtained history, performing a medically appropriate examination and/or evaluation , ordering medications, tests, or procedures, documenting information in the medical record and independently interpreting results and communicating that information with the patient/family/caregiver     Follow Up   Return in about 6 months (around 3/9/2022) for Next scheduled follow up.  Patient was given instructions and counseling regarding her condition or for health maintenance advice. Please see specific information pulled into the AVS if appropriate.     Current outpatient and discharge medications have been reconciled for the patient.  Reviewed by: Candelaria Ellis, SHAE, APRN

## 2021-09-15 ENCOUNTER — APPOINTMENT (OUTPATIENT)
Dept: CT IMAGING | Facility: HOSPITAL | Age: 66
End: 2021-09-15

## 2021-09-15 ENCOUNTER — TELEPHONE (OUTPATIENT)
Dept: SURGERY | Facility: CLINIC | Age: 66
End: 2021-09-15

## 2021-09-15 NOTE — TELEPHONE ENCOUNTER
patient has called back. she is still having left sided pain under her breast. she saw you for this not to long ago. made it sound like the IBprofen wasn't helping. should she keep taking it? she wants to know if the next CT chest may show what is causing the pain. should she get a mammogram or does the CT chest show abnormalities in th breast? she mentioned a lesion that was cut off there a while ago. she doesn't know if that is related.    Per yesenia - I dont think this problem is related to her lungs. Recomend to follow up with her PCP. She can try OTC Salonpas patch in the mean time. Is She doing her IS? We are mainly concerned abou the fluid on the right side, which continues to improve.    She is using her spirometer. She wants to see if stopping the Ibuprofen will help. She will also reach out to PCP.

## 2021-09-17 ENCOUNTER — OFFICE VISIT (OUTPATIENT)
Dept: FAMILY MEDICINE CLINIC | Facility: CLINIC | Age: 66
End: 2021-09-17

## 2021-09-17 VITALS
DIASTOLIC BLOOD PRESSURE: 76 MMHG | HEIGHT: 67 IN | RESPIRATION RATE: 18 BRPM | BODY MASS INDEX: 33.43 KG/M2 | WEIGHT: 213 LBS | TEMPERATURE: 97.1 F | OXYGEN SATURATION: 96 % | SYSTOLIC BLOOD PRESSURE: 134 MMHG | HEART RATE: 87 BPM

## 2021-09-17 DIAGNOSIS — R07.81 PLEURITIC CHEST PAIN: Primary | ICD-10-CM

## 2021-09-17 PROCEDURE — 96372 THER/PROPH/DIAG INJ SC/IM: CPT | Performed by: FAMILY MEDICINE

## 2021-09-17 PROCEDURE — 99999 PR OFFICE/OUTPT VISIT,PROCEDURE ONLY: CPT | Performed by: FAMILY MEDICINE

## 2021-09-17 PROCEDURE — 99214 OFFICE O/P EST MOD 30 MIN: CPT | Performed by: FAMILY MEDICINE

## 2021-09-17 RX ORDER — KETOROLAC TROMETHAMINE 30 MG/ML
30 INJECTION, SOLUTION INTRAMUSCULAR; INTRAVENOUS ONCE
Status: COMPLETED | OUTPATIENT
Start: 2021-09-17 | End: 2021-09-17

## 2021-09-17 RX ORDER — PREDNISONE 20 MG/1
20 TABLET ORAL DAILY
Qty: 20 TABLET | Refills: 0 | Status: SHIPPED | OUTPATIENT
Start: 2021-09-17 | End: 2021-09-30

## 2021-09-17 RX ORDER — KETOROLAC TROMETHAMINE 30 MG/ML
30 INJECTION, SOLUTION INTRAMUSCULAR; INTRAVENOUS EVERY 6 HOURS PRN
Status: SHIPPED | OUTPATIENT
Start: 2021-09-17 | End: 2021-09-22

## 2021-09-17 RX ADMIN — KETOROLAC TROMETHAMINE 30 MG: 30 INJECTION, SOLUTION INTRAMUSCULAR; INTRAVENOUS at 11:09

## 2021-09-17 RX ADMIN — KETOROLAC TROMETHAMINE 30 MG: 30 INJECTION, SOLUTION INTRAMUSCULAR; INTRAVENOUS at 11:07

## 2021-09-17 NOTE — PROGRESS NOTES
Subjective   Montserrat Cueva is a 65 y.o. female.   Chief Complaint   Patient presents with   • Abdominal Pain       Pt with pleurisy left side s/p covid last year. Recently seen by thoracic surgery who drained small amount of fluid from right chest. CT was otherwise unrevealing. Pt c/o persistent pleurisy pain on left side unchanged from her appt with thoracic. She would like to discuss doing something for the pain.   Last thoracic surgery note reviewed    Abdominal Pain  This is a recurrent problem. The current episode started more than 1 month ago. The problem occurs constantly. Pain location: left side. The abdominal pain radiates to the left shoulder. Pertinent negatives include no anorexia, fever, frequency, nausea or vomiting. Associated symptoms comments: Shortness of air  . The pain is aggravated by coughing and deep breathing. The pain is relieved by nothing.        The following portions of the patient's history were reviewed and updated as appropriate: allergies, current medications, past family history, past medical history, past social history, past surgical history and problem list.    Patient Active Problem List   Diagnosis   • Chronic allergic rhinitis due to pollen   • History of malignant neoplasm of female breast   • Mixed hyperlipidemia   • Asymptomatic menopausal state   • Class 1 obesity due to excess calories with serious comorbidity and body mass index (BMI) of 34.0 to 34.9 in adult   • Screening for malignant neoplasm of cervix   • Annual visit for general adult medical examination with abnormal findings   • Encounter for screening mammogram for malignant neoplasm of breast   • Colon cancer screening   • Perforation of right tympanic membrane   • Paroxysmal atrial fibrillation (CMS/HCC)   • History of COVID-19   • Chest pain   • Pleural effusion       Current Outpatient Medications on File Prior to Visit   Medication Sig Dispense Refill   • aspirin (aspirin) 81 MG EC tablet Take 81 mg by  "mouth Daily.     • cetirizine (zyrTEC) 10 MG tablet Take 10 mg by mouth Daily.     • EPINEPHrine (EPIPEN) 0.3 MG/0.3ML solution auto-injector injection Inject 0.3 mL into the appropriate muscle as directed by prescriber As Needed for Allergies.  3   • montelukast (SINGULAIR) 10 MG tablet TAKE 1 TABLET BY MOUTH ONCE DAILY AT NIGHT 90 tablet 4   • multivitamin with minerals (MULTIVITAMIN ADULT PO) Take 1 tablet by mouth Daily.     • rosuvastatin (CRESTOR) 5 MG tablet Take 1 tablet by mouth Daily. 90 tablet 4   • Ascorbic Acid (Vitamin C) 500 MG capsule Take 1 capsule by mouth Daily.       No current facility-administered medications on file prior to visit.     Current outpatient and discharge medications have been reconciled for the patient.  Reviewed by: Albino Giordano MD      No Known Allergies    Review of Systems   Constitutional: Negative for fatigue and fever.   Respiratory: Negative for cough and chest tightness.    Cardiovascular: Positive for chest pain (pleuritic ).   Gastrointestinal: Negative for anorexia, nausea and vomiting.   Genitourinary: Negative for flank pain and frequency.     I have reviewed and confirmed the accuracy of the ROS as documented by the MA/LPN/RN Albino Giordano MD    Objective   Visit Vitals  /76 (BP Location: Right arm, Patient Position: Sitting, Cuff Size: Adult)   Pulse 87   Temp 97.1 °F (36.2 °C)   Resp 18   Ht 170.2 cm (67\")   Wt 96.6 kg (213 lb)   LMP 01/01/1985   SpO2 96%   BMI 33.36 kg/m²       Physical Exam  Constitutional:       Appearance: She is well-developed.   HENT:      Head: Normocephalic and atraumatic.      Right Ear: External ear normal.      Left Ear: External ear normal.      Nose: Nose normal.   Eyes:      Pupils: Pupils are equal, round, and reactive to light.   Cardiovascular:      Rate and Rhythm: Normal rate and regular rhythm.      Heart sounds: Normal heart sounds.   Pulmonary:      Effort: Pulmonary effort is normal.      Breath " sounds: Normal breath sounds.   Abdominal:      General: Bowel sounds are normal.      Palpations: Abdomen is soft.   Musculoskeletal:         General: Normal range of motion.      Cervical back: Normal range of motion and neck supple.   Skin:     General: Skin is warm and dry.   Neurological:      Mental Status: She is alert and oriented to person, place, and time.   Psychiatric:         Behavior: Behavior normal.         Thought Content: Thought content normal.         Judgment: Judgment normal.         Assessment/Plan .    Diagnoses and all orders for this visit:    1. Pleuritic chest pain (Primary)  -     ketorolac (TORADOL) injection 30 mg  -     predniSONE (DELTASONE) 20 MG tablet; Take 1 tablet by mouth Daily for 13 days. TID x 3 days, BID x 3 days, QD x 3 days, 1/2 tab daily x 4 days  Dispense: 20 tablet; Refill: 0  -     diclofenac (VOLTAREN) 50 MG EC tablet; Take 1 tablet by mouth 2 (Two) Times a Day.  Dispense: 60 tablet; Refill: 0     Try toradol, steroids, diclofenac. Keep thoracic follow up for eval and management of her pleural effusion.      I wore protective equipment throughout this patient encounter to include mask and gloves. Hand hygiene was performed before donning protective equipment and after removal when leaving the room

## 2021-09-20 ENCOUNTER — APPOINTMENT (OUTPATIENT)
Dept: CT IMAGING | Facility: HOSPITAL | Age: 66
End: 2021-09-20

## 2021-09-29 ENCOUNTER — HOSPITAL ENCOUNTER (OUTPATIENT)
Dept: CT IMAGING | Facility: HOSPITAL | Age: 66
Discharge: HOME | End: 2021-09-29
Admitting: NURSE PRACTITIONER

## 2021-09-29 DIAGNOSIS — J90 BILATERAL PLEURAL EFFUSION: ICD-10-CM

## 2021-09-29 DIAGNOSIS — J90 PLEURAL EFFUSION: ICD-10-CM

## 2021-09-29 PROCEDURE — 71250 CT THORAX DX C-: CPT

## 2021-10-01 ENCOUNTER — TELEPHONE (OUTPATIENT)
Dept: SURGERY | Facility: CLINIC | Age: 66
End: 2021-10-01

## 2021-10-01 NOTE — TELEPHONE ENCOUNTER
Patient called yesterday and left a  and called repeatedly until I was able to answer the call directly. She wanted to know the results of her CT chest she had done yesterday. I informed patient we do not give results over the phone and that is what her appt is for on Tuesday. She was dissatisfied with that. She feels we havn't been giving her the proper care. She states her PCP gave her some steroids and she is feeling much better. Patient states if her CT is normal she doesn't want to waste the copay and pay to come to the office. I sent a message to Yesneia Ellis NP and she is willing to give her the results over the phone.   Per yesenia - If she is feeling better, I don’t see a need to come back for a follow up. CT was unremarkable for abnormal findings. I am glad she is feeling better with the steroids. She is welcome to schedule another appointment if she develops any problems in the future.  Patient iformed

## 2021-10-14 NOTE — PROGRESS NOTES
Date of Office Visit: 10/15/2021  Encounter Provider: Dr. Amilcar Raya  Place of Service: [facility] [place]  Patient Name: Montserrat Cueva  :1955  Nyla Alcazar MD    Chief Complaint   Patient presents with   • Atrial Fibrillation     hopsital follow up/echo   • Chest Pain   • Hyperlipidemia     History of Present Illness    I am pleased to see Mrs. Cueva in my office today as a follow-up    As you know, patient is 66-year-old white female whose past medical history significant for hyperlipidemia, who came today for follow-up    In 2020, prior to  patient contracted COVID-19 infection.  Patient recovered from this.  However in 2021, patient developed symptom of palpitation and was evaluated in PCP office.  EKG showed atrial fibrillation with rapid ventricular response.  Patient was started on sotalol 80 mg twice daily and Eliquis 5 mg twice daily.  Patient cardioverted.  Subsequently sotalol was decreased to 40 mg twice daily because of relative bradycardia.    In 2021, patient underwent stress test which was negative for ischemia and echocardiogram showed normal left ventricle size and function with EF of 60 to 65%.  Mild LVH is noted.  In 2021, patient underwent echocardiogram which showed EF of 60 to 65%.  Impaired relaxation noted.  No significant valvular heart disease    Since the previous visit, patient had admission in the hospital for pleural effusion.  Patient underwent thoracentesis.  Patient was given steroids.  Her pleural effusion has improved.  Patient denies any chest pain or tightness or heaviness.  No orthopnea PND no syncope or presyncope.    EKG showed normal sinus rhythm.  Poor progression of R wave noted.    Overall patient is doing well.  At this stage I will continue current treatment.  Patient is in sinus rhythm.  She has not had any recurrence of atrial fibrillation.  I would recommend observation.      Past Medical  History:   Diagnosis Date   • Asymptomatic menopausal state     Impression: dexa scan 02/12/2014. WNL Repeat 2019   • Atrial fibrillation (HCC)    • Breast cancer (HCC)     right   • Chronic allergic rhinitis due to pollen    • Disorder of bone and cartilage     Impression: -0.3 spine, -0.7 FN, -1.3 hip, 11/2009 Wt bearing exercise encouraged 11/09   • Drug therapy     6 months in 1993   • History of malignant neoplasm of female breast     Impression: stage II, s/p right modified mastectomy   • Hyperlipidemia, unspecified     Impression: Stable on meds.   • Menopausal syndrome     Impression: stage II, s/p right modified mastectomy   • Microscopic hematuria     Impression: previously negative cx No hx of stones.   • Plantar fasciitis, right     Impression: She is receiving orthospec from her podiatrist.         Past Surgical History:   Procedure Laterality Date   • BREAST BIOPSY     • MASTECTOMY MODIFIED RADICAL / SIMPLE / COMPLETE Right    • TOTAL ABDOMINAL HYSTERECTOMY      Comments: Ovaries Remain           Current Outpatient Medications:   •  aspirin (aspirin) 81 MG EC tablet, Take 81 mg by mouth Daily., Disp: , Rfl:   •  cetirizine (zyrTEC) 10 MG tablet, Take 10 mg by mouth Daily., Disp: , Rfl:   •  EPINEPHrine (EPIPEN) 0.3 MG/0.3ML solution auto-injector injection, Inject 0.3 mL into the appropriate muscle as directed by prescriber As Needed for Allergies., Disp: , Rfl: 3  •  montelukast (SINGULAIR) 10 MG tablet, TAKE 1 TABLET BY MOUTH ONCE DAILY AT NIGHT, Disp: 90 tablet, Rfl: 4  •  multivitamin with minerals (MULTIVITAMIN ADULT PO), Take 1 tablet by mouth Daily., Disp: , Rfl:   •  rosuvastatin (CRESTOR) 5 MG tablet, Take 1 tablet by mouth Daily., Disp: 90 tablet, Rfl: 4      Social History     Socioeconomic History   • Marital status:    Tobacco Use   • Smoking status: Never Smoker   • Smokeless tobacco: Never Used   Vaping Use   • Vaping Use: Never used   Substance and Sexual Activity   • Alcohol  "use: No   • Drug use: No   • Sexual activity: Defer         Review of Systems   Constitutional: Negative for chills and fever.   HENT: Negative for ear discharge and nosebleeds.    Eyes: Negative for discharge and redness.   Cardiovascular: Negative for chest pain, orthopnea, palpitations, paroxysmal nocturnal dyspnea and syncope.   Respiratory: Negative for cough, shortness of breath and wheezing.    Endocrine: Negative for heat intolerance.   Skin: Negative for rash.   Musculoskeletal: Negative for arthritis and myalgias.   Gastrointestinal: Negative for abdominal pain, melena, nausea and vomiting.   Genitourinary: Negative for dysuria and hematuria.   Neurological: Negative for dizziness, light-headedness, numbness and tremors.   Psychiatric/Behavioral: Negative for depression. The patient is not nervous/anxious.        Procedures      ECG 12 Lead    Date/Time: 10/15/2021 12:38 PM  Performed by: Amilcar Raya MD  Authorized by: Amilcar Raya MD   Comparison: compared with previous ECG   Similar to previous ECG  Rhythm: sinus rhythm  Other findings: poor R wave progression    Clinical impression: normal ECG            ECG 12 Lead    (Results Pending)           Objective:    /78   Pulse 77   Ht 170.2 cm (67.01\")   Wt 96.6 kg (213 lb)   LMP 01/01/1985   BMI 33.35 kg/m²         Constitutional:       Appearance: Well-developed.   Eyes:      General: No scleral icterus.        Right eye: No discharge.   HENT:      Head: Normocephalic and atraumatic.   Neck:      Thyroid: No thyromegaly.      Lymphadenopathy: No cervical adenopathy.   Pulmonary:      Effort: Pulmonary effort is normal. No respiratory distress.      Breath sounds: Normal breath sounds. No wheezing. No rales.   Cardiovascular:      Normal rate. Regular rhythm.      No gallop.   Abdominal:      Tenderness: There is no abdominal tenderness.   Skin:     Findings: No erythema or rash.   Neurological:      Mental Status: Alert and oriented to " person, place, and time.             Assessment:       Diagnosis Plan   1. Paroxysmal atrial fibrillation (HCC)  ECG 12 Lead   2. Chest pain, unspecified type  ECG 12 Lead   3. Mixed hyperlipidemia  ECG 12 Lead            Plan:       MDM:    1.  Paroxysmal atrial fibrillation:    Patient is in sinus rhythm.  Continue aspirin.    2.  Chest pain:    Patient chest pain is contained.  It was probably pleuritic in nature due to pleural effusion    3.  Hyperlipidemia:    Patient is on Crestor.  Recent blood work showed LDL of 93.  It is desirable

## 2021-10-15 ENCOUNTER — HOSPITAL ENCOUNTER (OUTPATIENT)
Dept: MAMMOGRAPHY | Facility: HOSPITAL | Age: 66
Discharge: HOME | End: 2021-10-15
Admitting: FAMILY MEDICINE

## 2021-10-15 ENCOUNTER — OFFICE VISIT (OUTPATIENT)
Dept: CARDIOLOGY | Facility: CLINIC | Age: 66
End: 2021-10-15

## 2021-10-15 VITALS
BODY MASS INDEX: 33.43 KG/M2 | SYSTOLIC BLOOD PRESSURE: 126 MMHG | HEART RATE: 77 BPM | HEIGHT: 67 IN | DIASTOLIC BLOOD PRESSURE: 78 MMHG | WEIGHT: 213 LBS

## 2021-10-15 DIAGNOSIS — E78.2 MIXED HYPERLIPIDEMIA: ICD-10-CM

## 2021-10-15 DIAGNOSIS — Z12.31 SCREENING MAMMOGRAM, ENCOUNTER FOR: ICD-10-CM

## 2021-10-15 DIAGNOSIS — R07.9 CHEST PAIN, UNSPECIFIED TYPE: ICD-10-CM

## 2021-10-15 DIAGNOSIS — I48.0 PAROXYSMAL ATRIAL FIBRILLATION (HCC): Primary | ICD-10-CM

## 2021-10-15 PROCEDURE — 93000 ELECTROCARDIOGRAM COMPLETE: CPT | Performed by: INTERNAL MEDICINE

## 2021-10-15 PROCEDURE — 77067 SCR MAMMO BI INCL CAD: CPT

## 2021-10-15 PROCEDURE — 99213 OFFICE O/P EST LOW 20 MIN: CPT | Performed by: INTERNAL MEDICINE

## 2021-10-15 PROCEDURE — 77063 BREAST TOMOSYNTHESIS BI: CPT

## 2021-10-29 DIAGNOSIS — I48.0 PAROXYSMAL ATRIAL FIBRILLATION (HCC): ICD-10-CM

## 2021-10-29 DIAGNOSIS — E78.2 MIXED HYPERLIPIDEMIA: Primary | ICD-10-CM

## 2021-11-16 DIAGNOSIS — Z20.822 ENCOUNTER FOR SCREENING LABORATORY TESTING FOR COVID-19 VIRUS IN ASYMPTOMATIC PATIENT: Primary | ICD-10-CM

## 2021-11-16 DIAGNOSIS — Z20.822 ENCOUNTER FOR SCREENING LABORATORY TESTING FOR COVID-19 VIRUS IN ASYMPTOMATIC PATIENT: ICD-10-CM

## 2021-11-16 PROBLEM — Z00.00 INITIAL MEDICARE ANNUAL WELLNESS VISIT: Status: ACTIVE | Noted: 2019-10-14

## 2021-11-17 ENCOUNTER — OFFICE VISIT (OUTPATIENT)
Dept: FAMILY MEDICINE CLINIC | Facility: CLINIC | Age: 66
End: 2021-11-17

## 2021-11-17 DIAGNOSIS — I48.0 PAROXYSMAL ATRIAL FIBRILLATION (HCC): ICD-10-CM

## 2021-11-17 DIAGNOSIS — Z85.3 HISTORY OF MALIGNANT NEOPLASM OF FEMALE BREAST: ICD-10-CM

## 2021-11-17 DIAGNOSIS — Z00.00 INITIAL MEDICARE ANNUAL WELLNESS VISIT: Primary | ICD-10-CM

## 2021-11-17 DIAGNOSIS — J30.1 CHRONIC ALLERGIC RHINITIS DUE TO POLLEN: ICD-10-CM

## 2021-11-17 DIAGNOSIS — Z12.4 SCREENING FOR MALIGNANT NEOPLASM OF CERVIX: ICD-10-CM

## 2021-11-17 DIAGNOSIS — Z12.31 ENCOUNTER FOR SCREENING MAMMOGRAM FOR MALIGNANT NEOPLASM OF BREAST: ICD-10-CM

## 2021-11-17 DIAGNOSIS — E66.09 CLASS 1 OBESITY DUE TO EXCESS CALORIES WITH SERIOUS COMORBIDITY AND BODY MASS INDEX (BMI) OF 34.0 TO 34.9 IN ADULT: ICD-10-CM

## 2021-11-17 DIAGNOSIS — Z86.16 HISTORY OF COVID-19: ICD-10-CM

## 2021-11-17 DIAGNOSIS — Z12.11 COLON CANCER SCREENING: ICD-10-CM

## 2021-11-17 DIAGNOSIS — Z78.0 ASYMPTOMATIC MENOPAUSAL STATE: ICD-10-CM

## 2021-11-17 DIAGNOSIS — E78.2 MIXED HYPERLIPIDEMIA: ICD-10-CM

## 2021-11-17 LAB
ALBUMIN SERPL-MCNC: 4.1 G/DL (ref 3.8–4.8)
ALBUMIN/GLOB SERPL: 1.3 {RATIO} (ref 1.2–2.2)
ALP SERPL-CCNC: 79 IU/L (ref 44–121)
ALT SERPL-CCNC: 16 IU/L (ref 0–32)
AST SERPL-CCNC: 20 IU/L (ref 0–40)
BASOPHILS # BLD AUTO: 0 X10E3/UL (ref 0–0.2)
BASOPHILS NFR BLD AUTO: 1 %
BILIRUB SERPL-MCNC: 0.3 MG/DL (ref 0–1.2)
BUN SERPL-MCNC: 16 MG/DL (ref 8–27)
BUN/CREAT SERPL: 25 (ref 12–28)
CALCIUM SERPL-MCNC: 9.4 MG/DL (ref 8.7–10.3)
CHLORIDE SERPL-SCNC: 105 MMOL/L (ref 96–106)
CHOLEST SERPL-MCNC: 192 MG/DL (ref 100–199)
CHOLEST/HDLC SERPL: 3.5 RATIO (ref 0–4.4)
CO2 SERPL-SCNC: 24 MMOL/L (ref 20–29)
CREAT SERPL-MCNC: 0.64 MG/DL (ref 0.57–1)
EOSINOPHIL # BLD AUTO: 0.3 X10E3/UL (ref 0–0.4)
EOSINOPHIL NFR BLD AUTO: 5 %
ERYTHROCYTE [DISTWIDTH] IN BLOOD BY AUTOMATED COUNT: 13.8 % (ref 11.7–15.4)
GLOBULIN SER CALC-MCNC: 3.1 G/DL (ref 1.5–4.5)
GLUCOSE SERPL-MCNC: 91 MG/DL (ref 65–99)
HCT VFR BLD AUTO: 41.3 % (ref 34–46.6)
HDLC SERPL-MCNC: 55 MG/DL
HGB BLD-MCNC: 13.4 G/DL (ref 11.1–15.9)
IMM GRANULOCYTES # BLD AUTO: 0 X10E3/UL (ref 0–0.1)
IMM GRANULOCYTES NFR BLD AUTO: 0 %
LDLC SERPL CALC-MCNC: 117 MG/DL (ref 0–99)
LYMPHOCYTES # BLD AUTO: 2 X10E3/UL (ref 0.7–3.1)
LYMPHOCYTES NFR BLD AUTO: 30 %
MCH RBC QN AUTO: 28.8 PG (ref 26.6–33)
MCHC RBC AUTO-ENTMCNC: 32.4 G/DL (ref 31.5–35.7)
MCV RBC AUTO: 89 FL (ref 79–97)
MONOCYTES # BLD AUTO: 0.7 X10E3/UL (ref 0.1–0.9)
MONOCYTES NFR BLD AUTO: 11 %
NEUTROPHILS # BLD AUTO: 3.5 X10E3/UL (ref 1.4–7)
NEUTROPHILS NFR BLD AUTO: 53 %
PLATELET # BLD AUTO: 299 X10E3/UL (ref 150–450)
POTASSIUM SERPL-SCNC: 4.4 MMOL/L (ref 3.5–5.2)
PROT SERPL-MCNC: 7.2 G/DL (ref 6–8.5)
RBC # BLD AUTO: 4.66 X10E6/UL (ref 3.77–5.28)
SARS-COV-2 AB SERPL IA-ACNC: >2500 U/ML
SARS-COV-2 AB SERPL-IMP: POSITIVE
SODIUM SERPL-SCNC: 140 MMOL/L (ref 134–144)
TRIGL SERPL-MCNC: 111 MG/DL (ref 0–149)
TSH SERPL DL<=0.005 MIU/L-ACNC: 1.08 UIU/ML (ref 0.45–4.5)
VLDLC SERPL CALC-MCNC: 20 MG/DL (ref 5–40)
WBC # BLD AUTO: 6.6 X10E3/UL (ref 3.4–10.8)

## 2021-11-17 PROCEDURE — G0438 PPPS, INITIAL VISIT: HCPCS | Performed by: FAMILY MEDICINE

## 2021-11-17 PROCEDURE — 1170F FXNL STATUS ASSESSED: CPT | Performed by: FAMILY MEDICINE

## 2021-11-17 PROCEDURE — 99214 OFFICE O/P EST MOD 30 MIN: CPT | Performed by: FAMILY MEDICINE

## 2021-11-17 PROCEDURE — 1160F RVW MEDS BY RX/DR IN RCRD: CPT | Performed by: FAMILY MEDICINE

## 2021-11-17 PROCEDURE — 1126F AMNT PAIN NOTED NONE PRSNT: CPT | Performed by: FAMILY MEDICINE

## 2021-11-17 PROCEDURE — 81002 URINALYSIS NONAUTO W/O SCOPE: CPT | Performed by: FAMILY MEDICINE

## 2021-11-17 RX ORDER — NICOTINE POLACRILEX 2 MG
1 GUM BUCCAL DAILY
COMMUNITY
End: 2022-01-11

## 2021-12-01 ENCOUNTER — TELEPHONE (OUTPATIENT)
Dept: FAMILY MEDICINE CLINIC | Facility: CLINIC | Age: 66
End: 2021-12-01

## 2021-12-01 NOTE — TELEPHONE ENCOUNTER
Patient is requesting orders for prosthetic bras be sent [facility] [place], [place]. A prescription is needed in order to get insurance to pay for the bras. Phone is [phone]. Patient does not know the fax number.

## 2021-12-02 VITALS
DIASTOLIC BLOOD PRESSURE: 70 MMHG | TEMPERATURE: 98 F | BODY MASS INDEX: 36.46 KG/M2 | SYSTOLIC BLOOD PRESSURE: 140 MMHG | OXYGEN SATURATION: 96 % | RESPIRATION RATE: 18 BRPM | WEIGHT: 218.8 LBS | HEART RATE: 92 BPM | HEIGHT: 65 IN

## 2021-12-02 PROBLEM — J90 PLEURAL EFFUSION: Status: RESOLVED | Noted: 2021-08-31 | Resolved: 2021-12-02

## 2021-12-02 PROBLEM — R07.9 CHEST PAIN: Status: RESOLVED | Noted: 2021-08-31 | Resolved: 2021-12-02

## 2021-12-02 PROBLEM — H72.91 PERFORATION OF RIGHT TYMPANIC MEMBRANE: Status: RESOLVED | Noted: 2019-12-06 | Resolved: 2021-12-02

## 2021-12-02 LAB
BILIRUB BLD-MCNC: NEGATIVE MG/DL
CLARITY, POC: CLEAR
COLOR UR: YELLOW
GLUCOSE UR STRIP-MCNC: NEGATIVE MG/DL
KETONES UR QL: NEGATIVE
LEUKOCYTE EST, POC: NEGATIVE
NITRITE UR-MCNC: NEGATIVE MG/ML
PH UR: 6.5 [PH] (ref 5–8)
PROT UR STRIP-MCNC: NEGATIVE MG/DL
RBC # UR STRIP: NEGATIVE /UL
SP GR UR: 1.02 (ref 1–1.03)
UROBILINOGEN UR QL: NORMAL

## 2021-12-03 NOTE — ASSESSMENT & PLAN NOTE
Patient's (Body mass index is 36.41 kg/m².) indicates that they are obese (BMI >30) with health conditions that include hypertension and dyslipidemias . Weight is improving with treatment. BMI is is above average; BMI management plan is completed. We discussed portion control and increasing exercise.

## 2021-12-20 ENCOUNTER — OFFICE VISIT (OUTPATIENT)
Dept: FAMILY MEDICINE CLINIC | Facility: CLINIC | Age: 66
End: 2021-12-20

## 2021-12-20 ENCOUNTER — HOSPITAL ENCOUNTER (OUTPATIENT)
Dept: GENERAL RADIOLOGY | Facility: HOSPITAL | Age: 66
Discharge: HOME | End: 2021-12-20
Admitting: FAMILY MEDICINE

## 2021-12-20 VITALS
WEIGHT: 218.6 LBS | DIASTOLIC BLOOD PRESSURE: 64 MMHG | OXYGEN SATURATION: 96 % | HEART RATE: 90 BPM | TEMPERATURE: 97.1 F | RESPIRATION RATE: 18 BRPM | HEIGHT: 65 IN | SYSTOLIC BLOOD PRESSURE: 124 MMHG | BODY MASS INDEX: 36.42 KG/M2

## 2021-12-20 DIAGNOSIS — R07.81 PLEURITIC CHEST PAIN: ICD-10-CM

## 2021-12-20 DIAGNOSIS — R07.81 PLEURITIC CHEST PAIN: Primary | ICD-10-CM

## 2021-12-20 DIAGNOSIS — Z86.16 HISTORY OF COVID-19: ICD-10-CM

## 2021-12-20 DIAGNOSIS — I48.0 PAROXYSMAL ATRIAL FIBRILLATION: ICD-10-CM

## 2021-12-20 DIAGNOSIS — M25.59 PAIN IN OTHER JOINT: ICD-10-CM

## 2021-12-20 DIAGNOSIS — E66.01 CLASS 2 SEVERE OBESITY DUE TO EXCESS CALORIES WITH SERIOUS COMORBIDITY AND BODY MASS INDEX (BMI) OF 36.0 TO 36.9 IN ADULT: ICD-10-CM

## 2021-12-20 PROCEDURE — 71046 X-RAY EXAM CHEST 2 VIEWS: CPT

## 2021-12-20 PROCEDURE — 36415 COLL VENOUS BLD VENIPUNCTURE: CPT | Performed by: FAMILY MEDICINE

## 2021-12-20 PROCEDURE — 99214 OFFICE O/P EST MOD 30 MIN: CPT | Performed by: FAMILY MEDICINE

## 2021-12-21 LAB
ALBUMIN SERPL-MCNC: 3.9 G/DL (ref 3.8–4.8)
ALBUMIN/GLOB SERPL: 1.2 {RATIO} (ref 1.2–2.2)
ALP SERPL-CCNC: 88 IU/L (ref 44–121)
ALT SERPL-CCNC: 11 IU/L (ref 0–32)
AST SERPL-CCNC: 14 IU/L (ref 0–40)
BASOPHILS # BLD AUTO: 0 X10E3/UL (ref 0–0.2)
BASOPHILS NFR BLD AUTO: 0 %
BILIRUB SERPL-MCNC: 0.4 MG/DL (ref 0–1.2)
BUN SERPL-MCNC: 10 MG/DL (ref 8–27)
BUN/CREAT SERPL: 20 (ref 12–28)
CALCIUM SERPL-MCNC: 8.8 MG/DL (ref 8.7–10.3)
CHLORIDE SERPL-SCNC: 104 MMOL/L (ref 96–106)
CO2 SERPL-SCNC: 22 MMOL/L (ref 20–29)
CREAT SERPL-MCNC: 0.5 MG/DL (ref 0.57–1)
EOSINOPHIL # BLD AUTO: 0.3 X10E3/UL (ref 0–0.4)
EOSINOPHIL NFR BLD AUTO: 4 %
ERYTHROCYTE [DISTWIDTH] IN BLOOD BY AUTOMATED COUNT: 13.1 % (ref 11.7–15.4)
GLOBULIN SER CALC-MCNC: 3.3 G/DL (ref 1.5–4.5)
GLUCOSE SERPL-MCNC: 89 MG/DL (ref 65–99)
HCT VFR BLD AUTO: 38.7 % (ref 34–46.6)
HGB BLD-MCNC: 12.6 G/DL (ref 11.1–15.9)
IMM GRANULOCYTES # BLD AUTO: 0 X10E3/UL (ref 0–0.1)
IMM GRANULOCYTES NFR BLD AUTO: 0 %
LYMPHOCYTES # BLD AUTO: 1.9 X10E3/UL (ref 0.7–3.1)
LYMPHOCYTES NFR BLD AUTO: 21 %
MCH RBC QN AUTO: 28.8 PG (ref 26.6–33)
MCHC RBC AUTO-ENTMCNC: 32.6 G/DL (ref 31.5–35.7)
MCV RBC AUTO: 89 FL (ref 79–97)
MONOCYTES # BLD AUTO: 1 X10E3/UL (ref 0.1–0.9)
MONOCYTES NFR BLD AUTO: 11 %
NEUTROPHILS # BLD AUTO: 5.7 X10E3/UL (ref 1.4–7)
NEUTROPHILS NFR BLD AUTO: 64 %
PLATELET # BLD AUTO: 335 X10E3/UL (ref 150–450)
POTASSIUM SERPL-SCNC: 4.1 MMOL/L (ref 3.5–5.2)
PROT SERPL-MCNC: 7.2 G/DL (ref 6–8.5)
RBC # BLD AUTO: 4.37 X10E6/UL (ref 3.77–5.28)
SODIUM SERPL-SCNC: 140 MMOL/L (ref 134–144)
WBC # BLD AUTO: 9 X10E3/UL (ref 3.4–10.8)

## 2021-12-23 ENCOUNTER — TELEPHONE (OUTPATIENT)
Dept: FAMILY MEDICINE CLINIC | Facility: CLINIC | Age: 66
End: 2021-12-23

## 2021-12-23 DIAGNOSIS — J06.9 UPPER RESPIRATORY TRACT INFECTION, UNSPECIFIED TYPE: Primary | ICD-10-CM

## 2021-12-23 RX ORDER — AZITHROMYCIN 250 MG/1
TABLET, FILM COATED ORAL
Qty: 6 TABLET | Refills: 0 | Status: SHIPPED | OUTPATIENT
Start: 2021-12-23 | End: 2021-12-30

## 2021-12-23 NOTE — TELEPHONE ENCOUNTER
Spoke with Montserrat Alcazar  z pack has been sent to pharmacy.   Call if symptoms do not improve..

## 2021-12-23 NOTE — TELEPHONE ENCOUNTER
Patient called with uri issues. She was seen by Dr Alcazar and  Was told she  Has fluid on her lungs. She is requesting medication to be sent to [facility] [place]

## 2021-12-30 ENCOUNTER — OFFICE VISIT (OUTPATIENT)
Dept: FAMILY MEDICINE CLINIC | Facility: CLINIC | Age: 66
End: 2021-12-30

## 2021-12-30 VITALS
HEART RATE: 64 BPM | WEIGHT: 217.6 LBS | RESPIRATION RATE: 16 BRPM | TEMPERATURE: 97.8 F | DIASTOLIC BLOOD PRESSURE: 60 MMHG | SYSTOLIC BLOOD PRESSURE: 120 MMHG | BODY MASS INDEX: 36.25 KG/M2 | OXYGEN SATURATION: 95 % | HEIGHT: 65 IN

## 2021-12-30 DIAGNOSIS — J90 PLEURAL EFFUSION: ICD-10-CM

## 2021-12-30 DIAGNOSIS — M54.2 NECK PAIN: Primary | ICD-10-CM

## 2021-12-30 PROCEDURE — 99213 OFFICE O/P EST LOW 20 MIN: CPT | Performed by: FAMILY MEDICINE

## 2021-12-30 NOTE — PROGRESS NOTES
"Answers for HPI/ROS submitted by the patient on 12/30/2021  Please describe your symptoms.: Pain from covid i had a year ago.  Last several days have had bad neck pain and in the shoulders. Tired of living on ibouphren all the time. Sometimes pain can be in rib cage.  Have you had these symptoms before?: Yes  How long have you been having these symptoms?: Greater than 2 weeks  Please list any medications you are currently taking for this condition.: Ibouphren  Please describe any probable cause for these symptoms. : Had covid a year ago and still have pronlems from it. Still have a little fluid around left lung.  What is the primary reason for your visit?: Other    Chief Complaint  Neck Pain    Subjective    History of Present Illness        Montserrat Cueva presents to [facility] for   Neck Pain   This is a new problem. The current episode started 1 to 4 weeks ago. The problem occurs constantly. The problem has been waxing and waning. The pain is associated with nothing. The pain is present in the midline (radiates to bilateral shoulders). The quality of the pain is described as aching. Pain scale: varies. Pain severity now: varies. Nothing aggravates the symptoms. The pain is same all the time. Stiffness is present: denies stiffness. Pertinent negatives include no numbness or tingling. She has tried NSAIDs (Z-lenny) for the symptoms. The treatment provided mild relief.        Objective   Vital Signs:   Visit Vitals  /60 (BP Location: Right arm, Patient Position: Sitting, Cuff Size: Large Adult)   Pulse 64   Temp 97.8 °F (36.6 °C) (Skin)   Resp 16   Ht 165.1 cm (65\")   Wt 98.7 kg (217 lb 9.6 oz)   LMP 01/01/1985   SpO2 95%   BMI 36.21 kg/m²       Physical Exam  Vitals reviewed.   Constitutional:       Appearance: She is well-developed.   HENT:      Head: Normocephalic.      Right Ear: External ear normal.      Left Ear: External ear normal.      Nose: Nose normal.   Eyes:      " Conjunctiva/sclera: Conjunctivae normal.   Cardiovascular:      Rate and Rhythm: Normal rate and regular rhythm.   Pulmonary:      Effort: Pulmonary effort is normal.      Breath sounds: Normal breath sounds.   Musculoskeletal:         General: Normal range of motion.      Cervical back: Normal range of motion and neck supple. Tenderness present.   Skin:     General: Skin is warm and dry.      Capillary Refill: Capillary refill takes less than 2 seconds.   Neurological:      Mental Status: She is alert and oriented to person, place, and time.            Result Review :                    Assessment and Plan      Diagnoses and all orders for this visit:    1. Neck pain (Primary)  Assessment & Plan:  Patient was advised to use Voltaren gel and heat on her neck  Patient was advised to continue using a neck massager.       2. Pleural effusion  Assessment & Plan:  Pt was given a sample of Spiriva respimat to help with her symptoms.              Follow Up   No follow-ups on file.  Patient was given instructions and counseling regarding her condition or for health maintenance advice. Please see specific information pulled into the AVS if appropriate.

## 2021-12-30 NOTE — ASSESSMENT & PLAN NOTE
Patient was advised to use Voltaren gel and heat on her neck  Patient was advised to continue using a neck massager.

## 2022-01-11 ENCOUNTER — TELEPHONE (OUTPATIENT)
Dept: FAMILY MEDICINE CLINIC | Facility: CLINIC | Age: 67
End: 2022-01-11

## 2022-01-11 ENCOUNTER — OFFICE VISIT (OUTPATIENT)
Dept: FAMILY MEDICINE CLINIC | Facility: CLINIC | Age: 67
End: 2022-01-11

## 2022-01-11 VITALS
OXYGEN SATURATION: 96 % | TEMPERATURE: 98.2 F | RESPIRATION RATE: 20 BRPM | WEIGHT: 213.8 LBS | HEART RATE: 98 BPM | DIASTOLIC BLOOD PRESSURE: 70 MMHG | BODY MASS INDEX: 35.62 KG/M2 | SYSTOLIC BLOOD PRESSURE: 118 MMHG | HEIGHT: 65 IN

## 2022-01-11 DIAGNOSIS — Z86.16 HISTORY OF COVID-19: ICD-10-CM

## 2022-01-11 DIAGNOSIS — R05.3 CHRONIC COUGH: Primary | ICD-10-CM

## 2022-01-11 DIAGNOSIS — I48.0 PAROXYSMAL ATRIAL FIBRILLATION: ICD-10-CM

## 2022-01-11 DIAGNOSIS — M25.50 ARTHRALGIA OF MULTIPLE JOINTS: ICD-10-CM

## 2022-01-11 DIAGNOSIS — R79.82 ELEVATED C-REACTIVE PROTEIN (CRP): ICD-10-CM

## 2022-01-11 DIAGNOSIS — E66.01 CLASS 2 SEVERE OBESITY DUE TO EXCESS CALORIES WITH SERIOUS COMORBIDITY AND BODY MASS INDEX (BMI) OF 36.0 TO 36.9 IN ADULT: ICD-10-CM

## 2022-01-11 DIAGNOSIS — J68.3 REACTIVE AIRWAYS DYSFUNCTION SYNDROME: ICD-10-CM

## 2022-01-11 PROCEDURE — 36415 COLL VENOUS BLD VENIPUNCTURE: CPT | Performed by: FAMILY MEDICINE

## 2022-01-11 PROCEDURE — 99214 OFFICE O/P EST MOD 30 MIN: CPT | Performed by: FAMILY MEDICINE

## 2022-01-11 PROCEDURE — 94060 EVALUATION OF WHEEZING: CPT | Performed by: FAMILY MEDICINE

## 2022-01-11 RX ORDER — MELATONIN
1000 DAILY
COMMUNITY

## 2022-01-11 RX ORDER — ALBUTEROL SULFATE 90 UG/1
2 AEROSOL, METERED RESPIRATORY (INHALATION) EVERY 4 HOURS PRN
Qty: 18 G | Refills: 12 | Status: SHIPPED | OUTPATIENT
Start: 2022-01-11 | End: 2022-02-09 | Stop reason: ALTCHOICE

## 2022-01-11 NOTE — TELEPHONE ENCOUNTER
Spoke   with Montserrat  she will need to marlena her insurance company to see what they will pay for, and let us know so we can order.

## 2022-01-11 NOTE — TELEPHONE ENCOUNTER
Patient called stating her Breo inhaler will be around $400. She is requesting a different inhaler.

## 2022-01-11 NOTE — PROGRESS NOTES
"Chief Complaint  URI and Atrial Fibrillation    Subjective     CC  Problem List  Visit Diagnosis   Encounters  Notes  Medications  Labs  Result Review Imaging  Media    Montserrat Cueva presents to [facility] for   Cough  This is a new problem. The current episode started 1 to 4 weeks ago. The problem has been gradually worsening. The cough is non-productive. Associated symptoms include chills and shortness of breath. Pertinent negatives include no chest pain, ear pain, fever, nasal congestion, postnasal drip, rash, rhinorrhea, sore throat or weight loss. The treatment provided no relief.   Atrial Fibrillation  Presents for follow-up visit. Symptoms include shortness of breath. Symptoms are negative for chest pain, dizziness and syncope. The symptoms have been stable. Past medical history includes atrial fibrillation.       Review of Systems   Constitutional: Positive for chills and fatigue. Negative for fever, unexpected weight gain and unexpected weight loss.   HENT: Negative for ear pain, postnasal drip, rhinorrhea, sinus pressure and sore throat.    Respiratory: Positive for cough (non productive ) and shortness of breath.    Cardiovascular: Negative for chest pain and syncope.   Gastrointestinal: Positive for diarrhea. Negative for nausea.   Endocrine: Negative for cold intolerance, heat intolerance, polydipsia and polyuria.   Musculoskeletal: Positive for arthralgias (multiple). Negative for joint swelling.   Skin: Negative for rash.   Neurological: Negative for dizziness.   Hematological: Negative for adenopathy. Does not bruise/bleed easily.   Psychiatric/Behavioral: The patient is nervous/anxious.         Objective   Vital Signs:   /70 (BP Location: Left arm, Patient Position: Sitting, Cuff Size: Large Adult)   Pulse 98   Temp 98.2 °F (36.8 °C) (Temporal)   Resp 20   Ht 165.1 cm (65\")   Wt 97 kg (213 lb 12.8 oz)   SpO2 96% Comment: Room air  BMI 35.58 " kg/m²     Physical Exam  Constitutional:       General: She is not in acute distress.  Cardiovascular:      Rate and Rhythm: Normal rate and regular rhythm.      Heart sounds: No murmur heard.      Pulmonary:      Effort: Pulmonary effort is normal.      Breath sounds: Normal breath sounds. No wheezing.   Musculoskeletal:         General: Deformity (distal fingers, and knees. ) present. No swelling or tenderness.      Cervical back: Neck supple.      Right lower leg: No edema.      Left lower leg: No edema.   Lymphadenopathy:      Cervical: No cervical adenopathy.   Skin:     Findings: No rash.   Neurological:      Mental Status: She is alert.          Procedures   PFT  FVC 47% improves to 53% with bronchodilators. Severe restriction which improves with meds.     Result Review :Labs  Result Review  Imaging  Med Tab  Media                 Assessment and Plan CC Problem List  Visit Diagnosis  ROS  Review (Popup)  Health Maintenance  Quality  BestPractice  Medications  SmartSets  SnapShot Encounters  Media  Problem List Items Addressed This Visit        High    Paroxysmal atrial fibrillation (HCC)    Overview     Onset 01/08/2021  No recurrence she off anti coagulation will d/c and begin   mg  She will continue  sotalol She is followed with [facility] her next apt is 01/2022  She understands that there is increase risk of clot off Eliquis.  Sinus Rhythm by exam,              Low    Class 2 severe obesity due to excess calories with serious comorbidity and body mass index (BMI) of 36.0 to 36.9 in adult (HCC)    Overview     Healthy diet and regular exercise encouraged.  Wt stable.          History of COVID-19    Overview     12/2020 with bilateral  Pneumonia, with persistent left pleural effusion,  gradally improving. Reassurance, repeat xray in 2 mos             Unprioritized    Reactive airways dysfunction syndrome (HCC)    Overview     1 yr post covid hx of bilateral plerual effusions draining of  right  persisitent small left effusion on xray 12/20/2021         Relevant Medications    Fluticasone Furoate-Vilanterol (BREO ELLIPTA) 100-25 MCG/INH inhaler    albuterol sulfate HFA (Ventolin HFA) 108 (90 Base) MCG/ACT inhaler    Other Relevant Orders    Pulmonary Function Test    Elevated C-reactive protein (CRP)    Overview     145, (nl 0-10) after covid and pleural effusions.   Rheumatology referral to r/o other etiology given arthralgias.            Other Visit Diagnoses     Chronic cough    -  Primary    r/o repeated covid, testing negative.     Relevant Orders    COVID-19,[facility] ROUTINE, NP/OP SWAB IN TRANSPORT MEDIA OR ESWAB 72 HR TAT - Swab, Anterior nasal (Completed)    Arthralgia of multiple joints        Relevant Orders    C-reactive Protein (Completed)    Nuclear Antigen Antibody, IFA (Completed)    Rheumatoid Factor (Completed)    Uric Acid (Completed)    CBC & Differential (Completed)    Comprehensive Metabolic Panel (Completed)          Follow Up Instructions Charge Capture  Follow-up Communications  Return if symptoms worsen or fail to improve.  Patient was given instructions and counseling regarding her condition or for health maintenance advice. Please see specific information pulled into the AVS if appropriate.      Site care done- cleaned with alcohol swab, procedure tolerated well, dressing applied. Venipuncture was obtained after 1 time(s). 2 tubes were drawn. Needle gauge used was 23-butterfly.

## 2022-01-11 NOTE — TELEPHONE ENCOUNTER
Patient called requesting an appointment due to chills and cough. None were available. A message to the nurse was requested.

## 2022-01-12 LAB
ALBUMIN SERPL-MCNC: 3.7 G/DL (ref 3.8–4.8)
ALBUMIN/GLOB SERPL: 1.1 {RATIO} (ref 1.2–2.2)
ALP SERPL-CCNC: 136 IU/L (ref 44–121)
ALT SERPL-CCNC: 15 IU/L (ref 0–32)
AST SERPL-CCNC: 17 IU/L (ref 0–40)
BASOPHILS # BLD AUTO: 0 X10E3/UL (ref 0–0.2)
BASOPHILS NFR BLD AUTO: 0 %
BILIRUB SERPL-MCNC: 0.5 MG/DL (ref 0–1.2)
BUN SERPL-MCNC: 10 MG/DL (ref 8–27)
BUN/CREAT SERPL: 17 (ref 12–28)
CALCIUM SERPL-MCNC: 8.8 MG/DL (ref 8.7–10.3)
CHLORIDE SERPL-SCNC: 100 MMOL/L (ref 96–106)
CO2 SERPL-SCNC: 21 MMOL/L (ref 20–29)
CREAT SERPL-MCNC: 0.58 MG/DL (ref 0.57–1)
CRP SERPL-MCNC: 145 MG/L (ref 0–10)
EOSINOPHIL # BLD AUTO: 0 X10E3/UL (ref 0–0.4)
EOSINOPHIL NFR BLD AUTO: 0 %
ERYTHROCYTE [DISTWIDTH] IN BLOOD BY AUTOMATED COUNT: 12.6 % (ref 11.7–15.4)
GLOBULIN SER CALC-MCNC: 3.5 G/DL (ref 1.5–4.5)
GLUCOSE SERPL-MCNC: 104 MG/DL (ref 65–99)
HCT VFR BLD AUTO: 33 % (ref 34–46.6)
HGB BLD-MCNC: 10.7 G/DL (ref 11.1–15.9)
IMM GRANULOCYTES # BLD AUTO: 0 X10E3/UL (ref 0–0.1)
IMM GRANULOCYTES NFR BLD AUTO: 0 %
LYMPHOCYTES # BLD AUTO: 1.3 X10E3/UL (ref 0.7–3.1)
LYMPHOCYTES NFR BLD AUTO: 9 %
MCH RBC QN AUTO: 27.9 PG (ref 26.6–33)
MCHC RBC AUTO-ENTMCNC: 32.4 G/DL (ref 31.5–35.7)
MCV RBC AUTO: 86 FL (ref 79–97)
MONOCYTES # BLD AUTO: 1.7 X10E3/UL (ref 0.1–0.9)
MONOCYTES NFR BLD AUTO: 12 %
NEUTROPHILS # BLD AUTO: 11.5 X10E3/UL (ref 1.4–7)
NEUTROPHILS NFR BLD AUTO: 79 %
PLATELET # BLD AUTO: 614 X10E3/UL (ref 150–450)
POTASSIUM SERPL-SCNC: 4.5 MMOL/L (ref 3.5–5.2)
PROT SERPL-MCNC: 7.2 G/DL (ref 6–8.5)
RBC # BLD AUTO: 3.84 X10E6/UL (ref 3.77–5.28)
RHEUMATOID FACT SERPL-ACNC: 15.2 IU/ML
SODIUM SERPL-SCNC: 135 MMOL/L (ref 134–144)
URATE SERPL-MCNC: 4.5 MG/DL (ref 3–7.2)
WBC # BLD AUTO: 14.6 X10E3/UL (ref 3.4–10.8)

## 2022-01-13 LAB
ANA TITR SER IF: NEGATIVE {TITER}
LABCORP SARS-COV-2, NAA 2 DAY TAT: NORMAL
SARS-COV-2 RNA RESP QL NAA+PROBE: NOT DETECTED

## 2022-01-14 RX ORDER — ROSUVASTATIN CALCIUM 5 MG/1
TABLET, COATED ORAL
Qty: 90 TABLET | Refills: 3 | Status: SHIPPED | OUTPATIENT
Start: 2022-01-14 | End: 2022-11-21 | Stop reason: SDUPTHER

## 2022-01-20 ENCOUNTER — TELEPHONE (OUTPATIENT)
Dept: FAMILY MEDICINE CLINIC | Facility: CLINIC | Age: 67
End: 2022-01-20

## 2022-01-20 NOTE — TELEPHONE ENCOUNTER
Patient is calling to check status of rheumatology referral. She would also like to know who she is being referred to.

## 2022-01-20 NOTE — TELEPHONE ENCOUNTER
Spoke with Montserrat yes referral has been faxed, that office will marlena with appointment time.

## 2022-01-20 NOTE — TELEPHONE ENCOUNTER
Patient called and said that  Rheumatology didn't receive her referral. Please contact her at . Thanks Naima

## 2022-01-21 ENCOUNTER — TELEPHONE (OUTPATIENT)
Dept: FAMILY MEDICINE CLINIC | Facility: CLINIC | Age: 67
End: 2022-01-21

## 2022-01-22 PROBLEM — R79.82 ELEVATED C-REACTIVE PROTEIN (CRP): Status: ACTIVE | Noted: 2022-01-22

## 2022-01-24 ENCOUNTER — TELEPHONE (OUTPATIENT)
Dept: FAMILY MEDICINE CLINIC | Facility: CLINIC | Age: 67
End: 2022-01-24

## 2022-01-24 NOTE — TELEPHONE ENCOUNTER
Patient called stating she was taking Breo inhaler and is now feeling better. She would  Like to know if she can discontinue it now. Please advise.

## 2022-02-09 ENCOUNTER — OFFICE VISIT (OUTPATIENT)
Dept: FAMILY MEDICINE CLINIC | Facility: CLINIC | Age: 67
End: 2022-02-09

## 2022-02-09 ENCOUNTER — TELEPHONE (OUTPATIENT)
Dept: FAMILY MEDICINE CLINIC | Facility: CLINIC | Age: 67
End: 2022-02-09

## 2022-02-09 VITALS
HEIGHT: 65 IN | OXYGEN SATURATION: 95 % | SYSTOLIC BLOOD PRESSURE: 130 MMHG | HEART RATE: 107 BPM | WEIGHT: 211 LBS | RESPIRATION RATE: 18 BRPM | TEMPERATURE: 97.3 F | BODY MASS INDEX: 35.16 KG/M2 | DIASTOLIC BLOOD PRESSURE: 80 MMHG

## 2022-02-09 DIAGNOSIS — J90 PLEURAL EFFUSION: ICD-10-CM

## 2022-02-09 DIAGNOSIS — Z86.16 HISTORY OF COVID-19: Primary | ICD-10-CM

## 2022-02-09 DIAGNOSIS — I48.0 PAROXYSMAL ATRIAL FIBRILLATION: ICD-10-CM

## 2022-02-09 DIAGNOSIS — R79.82 ELEVATED C-REACTIVE PROTEIN (CRP): ICD-10-CM

## 2022-02-09 DIAGNOSIS — J68.3 REACTIVE AIRWAYS DYSFUNCTION SYNDROME: ICD-10-CM

## 2022-02-09 DIAGNOSIS — E66.01 CLASS 2 SEVERE OBESITY DUE TO EXCESS CALORIES WITH SERIOUS COMORBIDITY AND BODY MASS INDEX (BMI) OF 36.0 TO 36.9 IN ADULT: ICD-10-CM

## 2022-02-09 PROCEDURE — 99214 OFFICE O/P EST MOD 30 MIN: CPT | Performed by: FAMILY MEDICINE

## 2022-02-09 NOTE — PROGRESS NOTES
Chief Complaint  Cough and Atrial Fibrillation    Subjective     CC  Problem List  Visit Diagnosis   Encounters  Notes  Medications  Labs  Result Review Imaging  Media    Montserrat Cueva presents to [facility] for   Montserrat was last seen in office on 01/11/2022 for a cough. Medications changes from last visit include Albuterol Sulfate 108 (90 Base) MCG/ACT and Fluticasone Furoate-Vilanterol 100-25 MCG/INH. She is here to follow up and repeat lab work. Montserrat was seen by [facility] one week ago and they performed lab work. Results are pending.             Cough  This is a new problem. The current episode started more than 1 month ago. The problem has been resolved. The cough is non-productive. Pertinent negatives include no chest pain, ear congestion, fever, headaches, nasal congestion, postnasal drip, sore throat, shortness of breath or weight loss. Associated symptoms comments: Pain in ribs with deep breathing. . Nothing aggravates the symptoms. She has tried oral steroids (systemic steroids and oral steroids) for the symptoms. The treatment provided moderate relief.   Atrial Fibrillation  Presents for follow-up visit. Symptoms are negative for chest pain, dizziness, shortness of breath and weakness. Past medical history includes atrial fibrillation.       Review of Systems   Constitutional: Negative for fatigue, fever, unexpected weight gain and unexpected weight loss.   HENT: Negative for postnasal drip and sore throat.    Respiratory: Positive for cough (very much improved. ). Negative for shortness of breath.    Cardiovascular: Negative for chest pain.   Endocrine: Negative for cold intolerance, heat intolerance, polydipsia and polyuria.   Neurological: Negative for dizziness, weakness, numbness and headache.        Objective   Vital Signs:   /80 (BP Location: Right arm, Patient Position: Sitting, Cuff Size: Adult)   Pulse 107   Temp 97.3 °F (36.3 °C)  "(Temporal)   Resp 18   Ht 165.1 cm (65\")   Wt 95.7 kg (211 lb)   SpO2 95% Comment: room air  BMI 35.11 kg/m²     Physical Exam  Constitutional:       General: She is not in acute distress.     Appearance: She is obese.   Neck:      Thyroid: No thyromegaly.      Vascular: No JVD.   Cardiovascular:      Rate and Rhythm: Normal rate and regular rhythm.      Pulses:           Dorsalis pedis pulses are 2+ on the right side and 2+ on the left side.      Heart sounds: No murmur heard.       Comments: Neg Santos's  Pulmonary:      Effort: Pulmonary effort is normal.      Breath sounds: Normal breath sounds. No wheezing.   Musculoskeletal:      Cervical back: Neck supple.      Right lower leg: No edema.      Left lower leg: No edema.   Lymphadenopathy:      Cervical: No cervical adenopathy.   Neurological:      Mental Status: She is alert.        Result Review :Labs  Result Review  Imaging  Med Tab  Media                 Assessment and Plan CC Problem List  Visit Diagnosis  ROS  Review (Popup)  Health Maintenance  Quality  BestPractice  Medications  SmartSets  SnapShot Encounters  Media  Problem List Items Addressed This Visit        High    Paroxysmal atrial fibrillation (HCC)    Overview     Onset 01/08/2021  No recurrence she is off anti coagulation    mg  She will continue  sotalol She is followed with [facility] her   Sinus Rhythm by exam,              Low    Class 2 severe obesity due to excess calories with serious comorbidity and body mass index (BMI) of 36.0 to 36.9 in adult (HCC)    Overview     Healthy diet and regular exercise encouraged.           Current Assessment & Plan     Patient's (Body mass index is 35.11 kg/m².) indicates that they are obese (BMI >30) with health conditions that include hypertension . Weight is unchanged. BMI is is above average; BMI management plan is completed. We discussed portion control and increasing exercise.          History of COVID-19 - Primary    Overview "     12/2020 with bilateral  Pneumonia, with persistent left pleural effusion,  gradally improving. Reassurance, repeat xray in 2 mos             Unprioritized    Pleural effusion    Overview     resolved         Reactive airways dysfunction syndrome (HCC)    Overview     1 yr post covid hx of bilateral plerual effusions draining of right  persisitent small left effusion on xray 12/20/2021  Sxs resolved with inhaled steroids.          Elevated C-reactive protein (CRP)    Overview     145, (nl 0-10) after covid and pleural effusions.   Rheumatology referral unrevealing elevation felt to be secondary to covid and inflammation from resulting pleural effusions, CRP now normal 02/09/2022               Follow Up Instructions Charge Capture  Follow-up Communications  Return in about 6 months (around 8/9/2022), or if symptoms worsen or fail to improve.  Patient was given instructions and counseling regarding her condition or for health maintenance advice. Please see specific information pulled into the AVS if appropriate.

## 2022-02-20 NOTE — ASSESSMENT & PLAN NOTE
Patient's (Body mass index is 35.11 kg/m².) indicates that they are obese (BMI >30) with health conditions that include hypertension . Weight is unchanged. BMI is is above average; BMI management plan is completed. We discussed portion control and increasing exercise.

## 2022-03-29 DIAGNOSIS — J30.1 CHRONIC ALLERGIC RHINITIS DUE TO POLLEN: ICD-10-CM

## 2022-03-29 RX ORDER — FLUTICASONE PROPIONATE 50 MCG
SPRAY, SUSPENSION (ML) NASAL
Qty: 16 G | Refills: 0 | Status: SHIPPED | OUTPATIENT
Start: 2022-03-29 | End: 2022-11-21 | Stop reason: SDUPTHER

## 2022-06-07 DIAGNOSIS — J30.1 CHRONIC ALLERGIC RHINITIS DUE TO POLLEN: ICD-10-CM

## 2022-06-07 RX ORDER — MONTELUKAST SODIUM 10 MG/1
TABLET ORAL
Qty: 90 TABLET | Refills: 3 | Status: SHIPPED | OUTPATIENT
Start: 2022-06-07 | End: 2022-11-21 | Stop reason: SDUPTHER

## 2022-09-13 ENCOUNTER — TELEPHONE (OUTPATIENT)
Dept: FAMILY MEDICINE CLINIC | Facility: CLINIC | Age: 67
End: 2022-09-13

## 2022-09-13 DIAGNOSIS — Z12.31 ENCOUNTER FOR SCREENING MAMMOGRAM FOR MALIGNANT NEOPLASM OF BREAST: Primary | ICD-10-CM

## 2022-09-13 DIAGNOSIS — E78.2 MIXED HYPERLIPIDEMIA: ICD-10-CM

## 2022-09-13 DIAGNOSIS — I48.0 PAROXYSMAL ATRIAL FIBRILLATION: ICD-10-CM

## 2022-09-13 NOTE — TELEPHONE ENCOUNTER
Caller: Montserrat Cueva    Relationship: Self    Best call back number: [phone]     What orders are you requesting (i.e. lab or imaging): MAMMOGRAM / YEARLY LABS     In what timeframe would the patient need to come in: A WEEK PRIOR TO AWV    Where will you receive your lab/imaging services:     Additional notes: PLEASE ADVISE

## 2022-10-27 ENCOUNTER — HOSPITAL ENCOUNTER (OUTPATIENT)
Dept: MAMMOGRAPHY | Facility: HOSPITAL | Age: 67
Discharge: HOME | End: 2022-10-27
Admitting: FAMILY MEDICINE

## 2022-10-27 ENCOUNTER — FLU SHOT (OUTPATIENT)
Dept: FAMILY MEDICINE CLINIC | Facility: CLINIC | Age: 67
End: 2022-10-27

## 2022-10-27 VITALS — TEMPERATURE: 97.7 F

## 2022-10-27 DIAGNOSIS — Z12.31 ENCOUNTER FOR SCREENING MAMMOGRAM FOR MALIGNANT NEOPLASM OF BREAST: ICD-10-CM

## 2022-10-27 DIAGNOSIS — Z23 NEED FOR IMMUNIZATION AGAINST INFLUENZA: Primary | ICD-10-CM

## 2022-10-27 PROCEDURE — 90662 IIV NO PRSV INCREASED AG IM: CPT | Performed by: FAMILY MEDICINE

## 2022-10-27 PROCEDURE — G0008 ADMIN INFLUENZA VIRUS VAC: HCPCS | Performed by: FAMILY MEDICINE

## 2022-10-27 PROCEDURE — 77063 BREAST TOMOSYNTHESIS BI: CPT

## 2022-10-27 PROCEDURE — 77067 SCR MAMMO BI INCL CAD: CPT

## 2022-11-17 NOTE — PROGRESS NOTES
Date of Office Visit: 2022  Encounter Provider: Dr. Amilcar Raya   Place of Service: [facility] [place]  Patient Name: Montserrat Cueva  :1955  Nyla Alcazar MD    Chief Complaint   Patient presents with   • Atrial Fibrillation   • Hyperlipidemia   • Follow-up     History of Present Illness    I am pleased to see Mrs. Cueva in my office today as a follow-up    As you know, patient is 67-year-old white female whose past medical history significant for hyperlipidemia, who came today for follow-up    In 2020, prior to  patient contracted COVID-19 infection.  Patient recovered from this.  However in 2021, patient developed symptom of palpitation and was evaluated in PCP office.  EKG showed atrial fibrillation with rapid ventricular response.  Patient was started on sotalol 80 mg twice daily and Eliquis 5 mg twice daily.  Patient cardioverted.  Subsequently sotalol was decreased to 40 mg twice daily because of relative bradycardia.    In 2021, patient underwent stress test which was negative for ischemia and echocardiogram showed normal left ventricle size and function with EF of 60 to 65%.  Mild LVH is noted.  In 2021, patient underwent echocardiogram which showed EF of 60 to 65%.  Impaired relaxation noted.  No significant valvular heart disease.    Since the previous visit, patient is overall doing fairly well from cardiovascular standpoint.  Patient denies any symptom of chest pain or tightness or heaviness no leg edema noted.  No recurrence of atrial fibrillation.  No palpitation.    EKG showed normal sinus rhythm nonspecific ST changes noted.    Overall patient is doing well.  She has not had any recurrence of atrial fibrillation.  At this stage I have no new recommendation.  Patient is advised to continue current therapy.  Patient is on aspirin.  I educated her about atrial fibrillation.  If there is recurrence of atrial fibrillation  patient is advised to call me.  I will see the patient as needed.      Past Medical History:   Diagnosis Date   • Asymptomatic menopausal state     Impression: dexa scan 02/12/2014. WNL Repeat 2019   • Atrial fibrillation (HCC)    • Breast cancer (HCC)     right   • Chronic allergic rhinitis due to pollen    • Disorder of bone and cartilage     Impression: -0.3 spine, -0.7 FN, -1.3 hip, 11/2009 Wt bearing exercise encouraged 11/09   • Drug therapy     6 months in 1993   • History of malignant neoplasm of female breast     Impression: stage II, s/p right modified mastectomy   • Hydronephrosis of left kidney    • Hyperlipidemia, unspecified     Impression: Stable on meds.   • Menopausal syndrome     Impression: stage II, s/p right modified mastectomy   • Microscopic hematuria     Impression: previously negative cx No hx of stones.   • Plantar fasciitis, right     Impression: She is receiving orthospec from her podiatrist.         Past Surgical History:   Procedure Laterality Date   • BREAST BIOPSY     • HYSTERECTOMY  08/1990    Right ovary removal, Cyst   • MASTECTOMY MODIFIED RADICAL / SIMPLE / COMPLETE Right            Current Outpatient Medications:   •  aspirin 81 MG EC tablet, Take 81 mg by mouth Daily., Disp: , Rfl:   •  cetirizine (zyrTEC) 10 MG tablet, Take 10 mg by mouth Daily., Disp: , Rfl:   •  cholecalciferol (VITAMIN D3) 25 MCG (1000 UT) tablet, Take 1,000 Units by mouth Daily., Disp: , Rfl:   •  EPINEPHrine (EPIPEN) 0.3 MG/0.3ML solution auto-injector injection, Inject 0.3 mL into the appropriate muscle as directed by prescriber As Needed for Allergies., Disp: , Rfl: 3  •  fluticasone (FLONASE) 50 MCG/ACT nasal spray, Use 2 spray(s) in each nostril once daily, Disp: 16 g, Rfl: 0  •  Fluticasone Furoate-Vilanterol (BREO ELLIPTA) 100-25 MCG/INH inhaler, Inhale 1 puff Daily., Disp: 1 each, Rfl: 12  •  montelukast (SINGULAIR) 10 MG tablet, TAKE 1 TABLET BY MOUTH ONCE DAILY AT NIGHT, Disp: 90 tablet, Rfl:  "3  •  Multiple Vitamins-Minerals (ZINC PO), Take 1 tablet by mouth Daily., Disp: , Rfl:   •  multivitamin with minerals tablet tablet, Take 1 tablet by mouth Daily., Disp: , Rfl:   •  rosuvastatin (CRESTOR) 5 MG tablet, Take 1 tablet by mouth once daily, Disp: 90 tablet, Rfl: 3      Social History     Socioeconomic History   • Marital status:    Tobacco Use   • Smoking status: Never   • Smokeless tobacco: Never   Vaping Use   • Vaping Use: Never used   Substance and Sexual Activity   • Alcohol use: No   • Drug use: No   • Sexual activity: Defer         Review of Systems   Constitutional: Negative for chills and fever.   HENT: Negative for ear discharge and nosebleeds.    Eyes: Negative for discharge and redness.   Cardiovascular: Negative for chest pain, orthopnea, palpitations, paroxysmal nocturnal dyspnea and syncope.   Respiratory: Negative for cough, shortness of breath and wheezing.    Endocrine: Negative for heat intolerance.   Skin: Negative for rash.   Musculoskeletal: Negative for arthritis and myalgias.   Gastrointestinal: Negative for abdominal pain, melena, nausea and vomiting.   Genitourinary: Negative for dysuria and hematuria.   Neurological: Negative for dizziness, light-headedness, numbness and tremors.   Psychiatric/Behavioral: Negative for depression. The patient is not nervous/anxious.        Procedures      ECG 12 Lead    Date/Time: 11/18/2022 1:26 PM  Performed by: Amilcar Raya MD  Authorized by: Amilcar Raya MD   Comparison: compared with previous ECG   Similar to previous ECG  Rhythm: sinus rhythm    Clinical impression: normal ECG            ECG 12 Lead    (Results Pending)           Objective:    /80 (BP Location: Left arm, Patient Position: Sitting, Cuff Size: Large Adult)   Pulse 75   Ht 165.1 cm (65\")   Wt 95.3 kg (210 lb)   LMP 01/01/1985   BMI 34.95 kg/m²         Constitutional:       Appearance: Well-developed.   Eyes:      General: No scleral icterus.        Right " eye: No discharge.   HENT:      Head: Normocephalic and atraumatic.   Neck:      Thyroid: No thyromegaly.      Lymphadenopathy: No cervical adenopathy.   Pulmonary:      Effort: Pulmonary effort is normal. No respiratory distress.      Breath sounds: Normal breath sounds. No wheezing. No rales.   Cardiovascular:      Normal rate. Regular rhythm.      No gallop.   Abdominal:      Tenderness: There is no abdominal tenderness.   Skin:     Findings: No erythema or rash.   Neurological:      Mental Status: Alert and oriented to person, place, and time.             Assessment:       Diagnosis Plan   1. Mixed hyperlipidemia  ECG 12 Lead      2. Paroxysmal atrial fibrillation (HCC)  ECG 12 Lead               Plan:       MDM:    1.  Paroxysmal atrial fibrillation:    Patient is maintaining sinus rhythm current treatment would be continued.  Patient has not had any recurrence.  I will see the patient as needed    2.  Hyperlipidemia:    Patient is on Crestor.  Monitor the LDL

## 2022-11-18 ENCOUNTER — OFFICE VISIT (OUTPATIENT)
Dept: CARDIOLOGY | Facility: CLINIC | Age: 67
End: 2022-11-18

## 2022-11-18 VITALS
HEIGHT: 65 IN | BODY MASS INDEX: 34.99 KG/M2 | HEART RATE: 75 BPM | WEIGHT: 210 LBS | SYSTOLIC BLOOD PRESSURE: 129 MMHG | DIASTOLIC BLOOD PRESSURE: 80 MMHG

## 2022-11-18 DIAGNOSIS — E78.2 MIXED HYPERLIPIDEMIA: Primary | ICD-10-CM

## 2022-11-18 DIAGNOSIS — I48.0 PAROXYSMAL ATRIAL FIBRILLATION: ICD-10-CM

## 2022-11-18 PROCEDURE — 99213 OFFICE O/P EST LOW 20 MIN: CPT | Performed by: INTERNAL MEDICINE

## 2022-11-18 PROCEDURE — 93000 ELECTROCARDIOGRAM COMPLETE: CPT | Performed by: INTERNAL MEDICINE

## 2022-11-21 ENCOUNTER — OFFICE VISIT (OUTPATIENT)
Dept: FAMILY MEDICINE CLINIC | Facility: CLINIC | Age: 67
End: 2022-11-21

## 2022-11-21 VITALS
HEIGHT: 66 IN | SYSTOLIC BLOOD PRESSURE: 130 MMHG | DIASTOLIC BLOOD PRESSURE: 78 MMHG | WEIGHT: 223.6 LBS | BODY MASS INDEX: 35.93 KG/M2 | OXYGEN SATURATION: 96 % | HEART RATE: 87 BPM | RESPIRATION RATE: 18 BRPM | TEMPERATURE: 97.7 F

## 2022-11-21 DIAGNOSIS — Z12.4 SCREENING FOR MALIGNANT NEOPLASM OF CERVIX: ICD-10-CM

## 2022-11-21 DIAGNOSIS — Z00.00 MEDICARE ANNUAL WELLNESS VISIT, SUBSEQUENT: Primary | ICD-10-CM

## 2022-11-21 DIAGNOSIS — I48.0 PAROXYSMAL ATRIAL FIBRILLATION: ICD-10-CM

## 2022-11-21 DIAGNOSIS — J68.3 REACTIVE AIRWAYS DYSFUNCTION SYNDROME: ICD-10-CM

## 2022-11-21 DIAGNOSIS — E78.2 MIXED HYPERLIPIDEMIA: ICD-10-CM

## 2022-11-21 DIAGNOSIS — Z12.11 COLON CANCER SCREENING: ICD-10-CM

## 2022-11-21 DIAGNOSIS — Z85.3 HISTORY OF MALIGNANT NEOPLASM OF FEMALE BREAST: ICD-10-CM

## 2022-11-21 DIAGNOSIS — J30.1 CHRONIC ALLERGIC RHINITIS DUE TO POLLEN: ICD-10-CM

## 2022-11-21 DIAGNOSIS — Z12.31 ENCOUNTER FOR SCREENING MAMMOGRAM FOR MALIGNANT NEOPLASM OF BREAST: ICD-10-CM

## 2022-11-21 DIAGNOSIS — Z78.0 ASYMPTOMATIC MENOPAUSAL STATE: ICD-10-CM

## 2022-11-21 DIAGNOSIS — R35.0 URINARY FREQUENCY: ICD-10-CM

## 2022-11-21 DIAGNOSIS — E66.09 CLASS 1 OBESITY DUE TO EXCESS CALORIES WITH SERIOUS COMORBIDITY AND BODY MASS INDEX (BMI) OF 34.0 TO 34.9 IN ADULT: ICD-10-CM

## 2022-11-21 PROBLEM — J90 PLEURAL EFFUSION: Status: RESOLVED | Noted: 2021-08-31 | Resolved: 2022-11-21

## 2022-11-21 PROBLEM — R79.82 ELEVATED C-REACTIVE PROTEIN (CRP): Status: RESOLVED | Noted: 2022-01-22 | Resolved: 2022-11-21

## 2022-11-21 PROBLEM — M54.2 NECK PAIN: Status: RESOLVED | Noted: 2021-12-30 | Resolved: 2022-11-21

## 2022-11-21 LAB
BILIRUB BLD-MCNC: NEGATIVE MG/DL
CLARITY, POC: ABNORMAL
COLOR UR: YELLOW
GLUCOSE UR STRIP-MCNC: NEGATIVE MG/DL
KETONES UR QL: ABNORMAL
LEUKOCYTE EST, POC: NEGATIVE
NITRITE UR-MCNC: NEGATIVE MG/ML
PH UR: 5 [PH] (ref 5–8)
PROT UR STRIP-MCNC: NEGATIVE MG/DL
RBC # UR STRIP: ABNORMAL /UL
SP GR UR: 1.01 (ref 1–1.03)
UROBILINOGEN UR QL: NORMAL

## 2022-11-21 PROCEDURE — 99213 OFFICE O/P EST LOW 20 MIN: CPT | Performed by: FAMILY MEDICINE

## 2022-11-21 PROCEDURE — 1159F MED LIST DOCD IN RCRD: CPT | Performed by: FAMILY MEDICINE

## 2022-11-21 PROCEDURE — G0439 PPPS, SUBSEQ VISIT: HCPCS | Performed by: FAMILY MEDICINE

## 2022-11-21 PROCEDURE — 1158F ADVNC CARE PLAN TLK DOCD: CPT | Performed by: FAMILY MEDICINE

## 2022-11-21 PROCEDURE — 81002 URINALYSIS NONAUTO W/O SCOPE: CPT | Performed by: FAMILY MEDICINE

## 2022-11-21 PROCEDURE — G0444 DEPRESSION SCREEN ANNUAL: HCPCS | Performed by: FAMILY MEDICINE

## 2022-11-21 RX ORDER — BUDESONIDE AND FORMOTEROL FUMARATE DIHYDRATE 160; 4.5 UG/1; UG/1
2 AEROSOL RESPIRATORY (INHALATION)
COMMUNITY

## 2022-11-21 RX ORDER — ROSUVASTATIN CALCIUM 5 MG/1
5 TABLET, COATED ORAL DAILY
Qty: 90 TABLET | Refills: 3 | Status: SHIPPED | OUTPATIENT
Start: 2022-11-21

## 2022-11-21 RX ORDER — MONTELUKAST SODIUM 10 MG/1
10 TABLET ORAL NIGHTLY
Qty: 90 TABLET | Refills: 3 | Status: SHIPPED | OUTPATIENT
Start: 2022-11-21

## 2022-11-21 RX ORDER — FLUTICASONE PROPIONATE 50 MCG
2 SPRAY, SUSPENSION (ML) NASAL DAILY
Qty: 16 G | Refills: 12 | Status: SHIPPED | OUTPATIENT
Start: 2022-11-21

## 2022-11-22 LAB
APPEARANCE UR: CLEAR
BACTERIA #/AREA URNS HPF: ABNORMAL /[HPF]
BILIRUB UR QL STRIP: NEGATIVE
CASTS URNS QL MICRO: ABNORMAL /LPF
COLOR UR: YELLOW
EPI CELLS #/AREA URNS HPF: ABNORMAL /HPF (ref 0–10)
GLUCOSE UR QL STRIP: NEGATIVE
HGB UR QL STRIP: NEGATIVE
KETONES UR QL STRIP: NEGATIVE
LEUKOCYTE ESTERASE UR QL STRIP: ABNORMAL
MICRO URNS: ABNORMAL
NITRITE UR QL STRIP: NEGATIVE
PH UR STRIP: 5.5 [PH] (ref 5–7.5)
PROT UR QL STRIP: NEGATIVE
RBC #/AREA URNS HPF: ABNORMAL /HPF (ref 0–2)
SP GR UR STRIP: 1.02 (ref 1–1.03)
UROBILINOGEN UR STRIP-MCNC: 0.2 MG/DL (ref 0.2–1)
WBC #/AREA URNS HPF: ABNORMAL /HPF (ref 0–5)

## 2022-11-28 ENCOUNTER — HOSPITAL ENCOUNTER (OUTPATIENT)
Dept: BONE DENSITY | Facility: HOSPITAL | Age: 67
Discharge: HOME | End: 2022-11-28
Admitting: FAMILY MEDICINE

## 2022-11-28 DIAGNOSIS — Z78.0 ASYMPTOMATIC MENOPAUSAL STATE: ICD-10-CM

## 2022-11-28 PROCEDURE — 77080 DXA BONE DENSITY AXIAL: CPT

## 2022-12-02 ENCOUNTER — TELEPHONE (OUTPATIENT)
Dept: FAMILY MEDICINE CLINIC | Facility: CLINIC | Age: 67
End: 2022-12-02

## 2022-12-02 NOTE — TELEPHONE ENCOUNTER
Mrs dumont came in today wanting to know the status of her at home coloscopy test she was wanting to know how long that should take? Or how would we check into that? Thanks call back [phone]

## 2022-12-02 NOTE — TELEPHONE ENCOUNTER
I have called the patient and said with the holidays it might be a delay in getting this to her but I have advised her that if she has not heard from them by the end of the week next week please let us know and we will try and figure out where the delay is in this

## 2022-12-02 NOTE — TELEPHONE ENCOUNTER
LMOM for the patient as to that we still do not have her report yet and asking when she sent it back them.   She was advised she can call and let us know and or send us a [employer] message    sent to pharmacy

## 2022-12-05 NOTE — ASSESSMENT & PLAN NOTE
Patient's (Body mass index is 36.36 kg/m².) indicates that they are obese (BMI >30) with health conditions that include dyslipidemias . Weight is improving with lifestyle modifications. BMI is is above average; BMI management plan is completed. We discussed low calorie, low carb based diet program, portion control and increasing exercise.

## 2022-12-13 ENCOUNTER — TELEPHONE (OUTPATIENT)
Dept: FAMILY MEDICINE CLINIC | Facility: CLINIC | Age: 67
End: 2022-12-13

## 2022-12-13 NOTE — TELEPHONE ENCOUNTER
AS OF TODAY, THE PATIENT HASN'T RECEIVED HER COLOGUARD TEST IN THE MAIL.  PLEASE SEND A 2ND REQUEST FOR THIS TEST TO BE SENT TO :  [address]   [place] [id number].

## 2023-01-18 ENCOUNTER — OFFICE VISIT (OUTPATIENT)
Dept: FAMILY MEDICINE CLINIC | Facility: CLINIC | Age: 68
End: 2023-01-18
Payer: MEDICARE

## 2023-01-18 VITALS
HEIGHT: 66 IN | OXYGEN SATURATION: 98 % | DIASTOLIC BLOOD PRESSURE: 66 MMHG | SYSTOLIC BLOOD PRESSURE: 126 MMHG | HEART RATE: 71 BPM | WEIGHT: 221.4 LBS | RESPIRATION RATE: 18 BRPM | TEMPERATURE: 97.1 F | BODY MASS INDEX: 35.58 KG/M2

## 2023-01-18 DIAGNOSIS — S00.03XA CONTUSION OF SCALP, INITIAL ENCOUNTER: Primary | ICD-10-CM

## 2023-01-18 DIAGNOSIS — I48.0 PAROXYSMAL ATRIAL FIBRILLATION: ICD-10-CM

## 2023-01-18 DIAGNOSIS — E66.01 CLASS 2 SEVERE OBESITY DUE TO EXCESS CALORIES WITH SERIOUS COMORBIDITY AND BODY MASS INDEX (BMI) OF 36.0 TO 36.9 IN ADULT: ICD-10-CM

## 2023-01-18 PROCEDURE — 99213 OFFICE O/P EST LOW 20 MIN: CPT | Performed by: FAMILY MEDICINE

## 2023-01-18 NOTE — PROGRESS NOTES
Chief Complaint  Follow-up (Urgent care /), Fall, and Atrial Fibrillation    Subjective     CC  Problem List  Visit Diagnosis   Encounters  Notes  Medications  Labs  Result Review Imaging  Media    Montserrat Cueva presents to [facility] for   History of Present Illness  Montserrat was seen at [facility] after hours  on 1/12/2023. She was seen for fall . Labs that were performed during the encounter included:none. Diagnostic studies that were performed included: None . Medication changes: None . Advised to place ice on her head every hour for 15 minutes at a time.   The patient presents with She fell from a stepstool at home (she missed a step). She hit the back of her head on the wood floor. Has a large knot on the back of the occipital scalp. No headache, no loss of consciousness, no nausea or vomiting. She has felt well since but reports continue swelling of her scalp. Mild bruising  of her skin, the area has not increased in size  Atrial Fibrillation  Presents for follow-up visit. Symptoms are negative for an AICD problem, bradycardia, chest pain, dizziness, hemodynamic instability, hypertension, hypotension, pacemaker problem, palpitations, shortness of breath, syncope, tachycardia and weakness. The symptoms have been stable. Past medical history includes atrial fibrillation. There are no medication compliance problems.   Fall  The accident occurred more than 1 week ago (1/12/2023). The fall occurred from a stool. She landed on hard floor. There was no blood loss. The point of impact was the head. The pain is present in the head. The pain is at a severity of 5/10. The pain is mild. Associated symptoms include headaches. Pertinent negatives include no abdominal pain, bowel incontinence, fever, hearing loss, hematuria, loss of consciousness, nausea, numbness, tingling, visual change or vomiting. Associated symptoms comments: Patient states that she had a hematoma after  "she feel from a step stool while placing some stuff in her cabinets. She states that when  She feel off it she states that she hit her hard wood floor really hard and she states that she laid there for a second as she states that it was very painful for her. She states that her  marlene simonsed she might need to go get this checked out and she states that she ended up going to the Urgent care [facility] to be checked and she states that they told her to place an ice pack on it every hour for 15 minutes at a time . She states that it has went down but she states that she still has the knot there and it is very tender. .       Review of Systems   Constitutional: Negative for fever.   Eyes: Negative for visual disturbance.   Respiratory: Negative for shortness of breath and wheezing.    Cardiovascular: Negative for chest pain, palpitations and syncope.   Gastrointestinal: Negative for abdominal pain, bowel incontinence, nausea and vomiting.   Genitourinary: Negative for hematuria.   Skin: Positive for bruise.   Neurological: Negative for dizziness, tingling, loss of consciousness, weakness, numbness and headache.   Hematological: Negative for adenopathy. Does not bruise/bleed easily.        Objective   Vital Signs:   /66 (BP Location: Right arm, Patient Position: Sitting, Cuff Size: Adult)   Pulse 71   Temp 97.1 °F (36.2 °C) (Temporal)   Resp 18   Ht 166.9 cm (65.7\")   Wt 100 kg (221 lb 6.4 oz)   SpO2 98%   BMI 36.06 kg/m²     Physical Exam  Constitutional:       General: She is not in acute distress.     Appearance: She is obese.   HENT:      Head: Normocephalic.      Comments: There is a 5 cm round area of hematoma over the left parietal area of the scalp, there is no ecchymosis mild erythema no warmth.   Eyes:      Pupils: Pupils are equal, round, and reactive to light.      Comments: Fundi benign   Cardiovascular:      Rate and Rhythm: Normal rate and regular rhythm.      Heart sounds: No murmur " heard.  Pulmonary:      Effort: Pulmonary effort is normal.      Breath sounds: Normal breath sounds. No wheezing.   Musculoskeletal:      Cervical back: Neck supple.      Right lower leg: No edema.      Left lower leg: No edema.   Lymphadenopathy:      Cervical: No cervical adenopathy.   Skin:     Findings: No rash.   Neurological:      General: No focal deficit present.      Mental Status: She is alert and oriented to person, place, and time.      Sensory: No sensory deficit.      Motor: No weakness.      Coordination: Coordination normal.      Deep Tendon Reflexes: Reflexes normal.        Result Review :Labs  Result Review  Imaging  Med Tab  Media                 Assessment and Plan CC Problem List  Visit Diagnosis  ROS  Review (Popup)  Health Maintenance  Quality  BestPractice  Medications  SmartSets  SnapShot Encounters  Media  Problem List Items Addressed This Visit        High    Paroxysmal atrial fibrillation (HCC)    Overview     Onset 01/08/2021  No recurrence she is off anti-arrthymic, and anti coagulation   ASA 81 mg  She is followed with [facility]   Sinus Rhythm by exam,    Occurrence likely post covid inflammation which is documented w/ hx of pleural effusion.             Low    Class 2 severe obesity due to excess calories with serious comorbidity and body mass index (BMI) of 36.0 to 36.9 in adult (HCC)    Overview     Healthy diet and regular exercise encouraged.          Other Visit Diagnoses     Contusion of scalp, initial encounter    -  Primary    No hx of LOC, no neuro sxs, neg exam. Reassurance and follow prn problems          Follow Up Instructions Charge Capture  Follow-up Communications  Return if symptoms worsen or fail to improve.  Patient was given instructions and counseling regarding her condition or for health maintenance advice. Please see specific information pulled into the AVS if appropriate.

## 2023-05-08 ENCOUNTER — HOSPITAL ENCOUNTER (OUTPATIENT)
Dept: GENERAL RADIOLOGY | Facility: HOSPITAL | Age: 68
Discharge: HOME | End: 2023-05-08
Admitting: FAMILY MEDICINE
Payer: MEDICARE

## 2023-05-08 ENCOUNTER — OFFICE VISIT (OUTPATIENT)
Dept: FAMILY MEDICINE CLINIC | Facility: CLINIC | Age: 68
End: 2023-05-08

## 2023-05-08 VITALS
HEART RATE: 92 BPM | HEIGHT: 66 IN | TEMPERATURE: 97.5 F | DIASTOLIC BLOOD PRESSURE: 100 MMHG | OXYGEN SATURATION: 97 % | WEIGHT: 222.8 LBS | SYSTOLIC BLOOD PRESSURE: 160 MMHG | RESPIRATION RATE: 16 BRPM | BODY MASS INDEX: 35.81 KG/M2

## 2023-05-08 DIAGNOSIS — L90.5 SCAR TISSUE: ICD-10-CM

## 2023-05-08 DIAGNOSIS — J30.1 CHRONIC ALLERGIC RHINITIS DUE TO POLLEN: ICD-10-CM

## 2023-05-08 DIAGNOSIS — E66.01 CLASS 2 SEVERE OBESITY DUE TO EXCESS CALORIES WITH SERIOUS COMORBIDITY AND BODY MASS INDEX (BMI) OF 36.0 TO 36.9 IN ADULT: ICD-10-CM

## 2023-05-08 DIAGNOSIS — I48.0 PAROXYSMAL ATRIAL FIBRILLATION: ICD-10-CM

## 2023-05-08 DIAGNOSIS — S89.92XD LEFT KNEE INJURY, SUBSEQUENT ENCOUNTER: Primary | ICD-10-CM

## 2023-05-08 DIAGNOSIS — M17.12 PRIMARY OSTEOARTHRITIS OF LEFT KNEE: ICD-10-CM

## 2023-05-08 PROCEDURE — 73564 X-RAY EXAM KNEE 4 OR MORE: CPT

## 2023-05-08 NOTE — PROGRESS NOTES
Chief Complaint  Atrial Fibrillation, Skin Lesion, Knee Pain, and URI    Subjective     CC  Problem List  Visit Diagnosis   Encounters  Notes  Medications  Labs  Result Review Imaging  Media    Montserrat Cueva presents to [facility] for   History of Present Illness  Montserrat here for Leg pain, Ear pain and sore throat.     She reports concern for a lesion on the right medial distal calf that has been present for greater than 1 year. She reports that a previous lesion was shaved from the area. Recently the lesion is irritated and maybe enlarging slightly.   Leg Pain   The incident occurred more than 1 week ago. The incident occurred at home. There was no injury mechanism. The pain is present in the left knee. The quality of the pain is described as cramping. The pain is at a severity of 8/10. The pain is moderate. The pain has been constant since onset. Pertinent negatives include no numbness or tingling. She reports no foreign bodies present. The symptoms are aggravated by movement and weight bearing. She has tried ice, heat and NSAIDs for the symptoms. The treatment provided mild relief.   Earache   There is pain in the left ear. This is a new problem. The current episode started in the past 7 days. The problem occurs every few hours. The problem has been unchanged. There has been no fever. The pain is at a severity of 8/10. Associated symptoms include a sore throat. Pertinent negatives include no abdominal pain, coughing, diarrhea or ear discharge. She has tried NSAIDs for the symptoms. The treatment provided mild relief.   Sore Throat   This is a new problem. The current episode started in the past 7 days. The problem has been waxing and waning. The pain is worse on the left side. There has been no fever. The pain is at a severity of 8/10. The pain is mild. Associated symptoms include ear pain. Pertinent negatives include no abdominal pain, coughing, diarrhea, ear discharge  "or shortness of breath. She has had no exposure to strep or mono. She has tried NSAIDs for the symptoms. The treatment provided mild relief.       Review of Systems   Constitutional: Negative for fever and unexpected weight loss.   HENT: Positive for ear pain and sore throat. Negative for ear discharge.    Eyes: Negative for visual disturbance.   Respiratory: Negative for cough, shortness of breath and wheezing.    Cardiovascular: Negative for chest pain and palpitations.   Gastrointestinal: Negative for abdominal pain and diarrhea.   Endocrine: Negative for cold intolerance, heat intolerance, polydipsia and polyuria.   Musculoskeletal: Positive for arthralgias (left knee).   Skin: Positive for skin lesions (right medial calf).   Neurological: Negative for tingling and numbness.   Hematological: Negative for adenopathy. Does not bruise/bleed easily.        Objective   Vital Signs:   /100 (BP Location: Left arm, Patient Position: Sitting, Cuff Size: Adult)   Pulse 92   Temp 97.5 °F (36.4 °C) (Infrared)   Resp 16   Ht 167.6 cm (66\")   Wt 101 kg (222 lb 12.8 oz)   SpO2 97% Comment: room air  BMI 35.96 kg/m²     Physical Exam  Constitutional:       General: She is not in acute distress.     Appearance: She is obese.   HENT:      Right Ear: Tympanic membrane normal.      Left Ear: Tympanic membrane normal.      Nose: Rhinorrhea present.      Mouth/Throat:      Pharynx: No oropharyngeal exudate or posterior oropharyngeal erythema (moderate post nasal secretions).   Eyes:      Conjunctiva/sclera: Conjunctivae normal.   Cardiovascular:      Rate and Rhythm: Normal rate and regular rhythm.      Heart sounds: No murmur heard.    No friction rub.   Pulmonary:      Breath sounds: Normal breath sounds. No wheezing.   Musculoskeletal:      Cervical back: Neck supple.      Left knee: Deformity (arthritic) and crepitus present. No swelling or bony tenderness. Decreased range of motion. No tenderness. Abnormal " alignment. Normal pulse.      Right lower leg: No edema.      Left lower leg: No edema.   Lymphadenopathy:      Cervical: No cervical adenopathy.   Skin:     Findings: Lesion (There is a 6 mm round raised well circumscribed flesh over the medial portion of the right distal medial calf. ) present. No rash.   Neurological:      Mental Status: She is alert.        Result Review :Labs  Result Review  Imaging  Med Tab  Media          Skin Excision    Date/Time: 5/29/2023 12:04 PM  Performed by: Nyla Alcazar MD  Authorized by: Nyla Alcazar MD     Consent:     Consent obtained:  Written    Consent given by:  Patient    Risks discussed:  Bleeding, infection and poor cosmetic result    Alternatives discussed:  Referral  Pre-procedure details:     Preparation: Patient was prepped and draped in usual sterile fashion    Anesthesia:     Anesthesia method:  Local infiltration    Local anesthetic:  Lidocaine 1% w/o epi  Procedure details:     Lower Extremity Location:  Right lower leg (medial distal calf)    Malignancy: benign lesion      Initial Size:  6 (mm)    Final defect size (mm):  15  Post-procedure details:     Patient tolerance of procedure:  Tolerated well, no immediate complications  Comments:      Montserrat has a 6 mm soft slightly brown lesion over the distal medial right calf. It appears to be hyperplastic tissue of scar. The lesion was cleansed with betadine anesthestized with 2% lidocaine, with epinephrine. Under sterile conditions the lesion was excised completely with a 1.5 cm elliptical incision. The wound was closed with 3, 5-0 nylon suture. Specimen was sent for pathology. Good results. Bleeding was minimal. Pressure dressing. Wound care precautions given. She left the office in stable conditions.           Assessment and Plan CC Problem List  Visit Diagnosis  ROS  Review (Popup)  Health Maintenance  Quality  BestPractice  Medications  SmartSets  SnapShot Encounters  Media  Problem List  Items Addressed This Visit        High    Paroxysmal atrial fibrillation (HCC)    Overview     Onset 01/08/2021  No recurrence she is off anti-arrthymic, and anti coagulation   ASA 81 mg  She is followed with [facility]   Sinus Rhythm by exam,    Occurrence likely post covid inflammation which is documented w/ hx of pleural effusion            Low    Chronic allergic rhinitis due to pollen    Overview     She is on immunRx   She is compliant with meds which are continue.  Avoidance, mask for yard mowing and follow up if no improvement.          Class 2 severe obesity due to excess calories with serious comorbidity and body mass index (BMI) of 36.0 to 36.9 in adult (HCC)    Overview     Healthy diet and regular exercise encouraged.           Current Assessment & Plan     Patient's (Body mass index is 35.96 kg/m².) indicates that they are obese (BMI >30) with health conditions that include dyslipidemias . Weight is unchanged. BMI  is above average; BMI management plan is completed. We discussed portion control and increasing exercise.          Primary osteoarthritis of left knee    Overview     Moderate on xray 05/08//2023, Ice heat, topical and po anti inflammatories.   Wt loss encouraged        Other Visit Diagnoses     Left knee injury, subsequent encounter    -  Primary    Relevant Orders    XR Knee 4+ View Left (Completed)    Scar tissue        Right lateral distal calf,   Lesion excised, wound care precauations f/u 2 weeks for suture removal.     Relevant Orders    Reference Histopathology (Completed)    Skin Excision          Follow Up Instructions Charge Capture  Follow-up Communications  Return in about 3 months (around 8/8/2023), or if symptoms worsen or fail to improve.  Patient was given instructions and counseling regarding her condition or for health maintenance advice. Please see specific information pulled into the AVS if appropriate.   Answers for HPI/ROS submitted by the patient on 5/8/2023  What is the  primary reason for your visit?: Ear Pain

## 2023-05-12 ENCOUNTER — TELEPHONE (OUTPATIENT)
Dept: FAMILY MEDICINE CLINIC | Facility: CLINIC | Age: 68
End: 2023-05-12
Payer: MEDICARE

## 2023-05-12 RX ORDER — AMOXICILLIN 500 MG/1
1000 CAPSULE ORAL 3 TIMES DAILY
Qty: 30 CAPSULE | Refills: 0 | Status: SHIPPED | OUTPATIENT
Start: 2023-05-12

## 2023-05-12 NOTE — TELEPHONE ENCOUNTER
"Caller: Montserrat Cueva    Relationship: Self    Best call back number: [phone]    What medication are you requesting: AMOXACILLIN     What are your current symptoms: SORE THROAT, \"HORSE VOICE\"    If a prescription is needed, what is your preferred pharmacy and phone number: [facility]  [place], [place] - [address] - [phone]  - [phone]      Additional notes:          "

## 2023-05-17 ENCOUNTER — OFFICE VISIT (OUTPATIENT)
Dept: FAMILY MEDICINE CLINIC | Facility: CLINIC | Age: 68
End: 2023-05-17

## 2023-05-17 VITALS
HEART RATE: 89 BPM | HEIGHT: 66 IN | RESPIRATION RATE: 18 BRPM | WEIGHT: 219.2 LBS | BODY MASS INDEX: 35.23 KG/M2 | DIASTOLIC BLOOD PRESSURE: 82 MMHG | OXYGEN SATURATION: 96 % | TEMPERATURE: 97.1 F | SYSTOLIC BLOOD PRESSURE: 146 MMHG

## 2023-05-17 DIAGNOSIS — J30.1 CHRONIC ALLERGIC RHINITIS DUE TO POLLEN: ICD-10-CM

## 2023-05-17 DIAGNOSIS — E66.01 CLASS 2 SEVERE OBESITY DUE TO EXCESS CALORIES WITH SERIOUS COMORBIDITY AND BODY MASS INDEX (BMI) OF 36.0 TO 36.9 IN ADULT: ICD-10-CM

## 2023-05-17 DIAGNOSIS — J68.3 REACTIVE AIRWAYS DYSFUNCTION SYNDROME: ICD-10-CM

## 2023-05-17 DIAGNOSIS — M17.12 PRIMARY OSTEOARTHRITIS OF LEFT KNEE: Primary | ICD-10-CM

## 2023-05-17 PROCEDURE — 99214 OFFICE O/P EST MOD 30 MIN: CPT | Performed by: FAMILY MEDICINE

## 2023-05-17 RX ORDER — ALBUTEROL SULFATE 90 UG/1
AEROSOL, METERED RESPIRATORY (INHALATION)
COMMUNITY
Start: 2023-05-16

## 2023-05-17 RX ORDER — PREDNISONE 10 MG/1
10 TABLET ORAL TAKE AS DIRECTED
Qty: 35 TABLET | Refills: 0 | Status: SHIPPED | OUTPATIENT
Start: 2023-05-17 | End: 2023-06-01

## 2023-05-17 NOTE — PROGRESS NOTES
Chief Complaint  Leg Pain, Allergic Rhinitis, and Asthma    Subjective     CC  Problem List  Visit Diagnosis   Encounters  Notes  Medications  Labs  Result Review Imaging  Media    Montserrat Cueva presents to [facility] for   History of Present Illness  Montserrat is here today for left knee and leg pain.   Leg Pain   The incident occurred more than 1 week ago. The incident occurred at home. There was no injury mechanism. The pain is present in the left knee. The quality of the pain is described as aching. The pain is at a severity of 9/10. The pain is moderate. The pain has been Constant since onset. Pertinent negatives include no numbness or tingling. She reports no foreign bodies present. The symptoms are aggravated by movement. She has tried NSAIDs for the symptoms. The treatment provided mild relief.   Cough  This is a chronic problem. The current episode started 1 to 4 weeks ago. The problem has been gradually worsening. The cough is Non-productive. Associated symptoms include chest pain and wheezing. Pertinent negatives include no fever, shortness of breath or weight loss. The symptoms are aggravated by pollens. She has tried steroid inhaler for the symptoms. The treatment provided mild relief. Her past medical history is significant for asthma.     Review of Systems   Constitutional:  Negative for fever and unexpected weight loss.   Respiratory:  Positive for cough and wheezing. Negative for shortness of breath.    Cardiovascular:  Positive for chest pain. Negative for palpitations.   Endocrine: Negative for cold intolerance, heat intolerance, polydipsia and polyuria.   Musculoskeletal:  Positive for arthralgias (left knee).   Neurological:  Negative for tingling and numbness.   Hematological:  Negative for adenopathy. Does not bruise/bleed easily.      Objective   Vital Signs:   /82 (BP Location: Left arm, Patient Position: Sitting, Cuff Size: Adult)   Pulse 89   " Temp 97.1 °F (36.2 °C) (Infrared)   Resp 18   Ht 167.6 cm (66\")   Wt 99.4 kg (219 lb 3.2 oz)   SpO2 96% Comment: room air  BMI 35.38 kg/m²     Physical Exam  Constitutional:       General: She is not in acute distress.     Appearance: She is obese.   Cardiovascular:      Rate and Rhythm: Normal rate and regular rhythm.      Heart sounds: No murmur heard.  Pulmonary:      Effort: Pulmonary effort is normal.      Breath sounds: No wheezing (but Bs are coarse).   Musculoskeletal:      Cervical back: Neck supple.      Left knee: Swelling (mild), deformity and crepitus present. No bony tenderness. Decreased range of motion. No tenderness.      Right lower leg: No edema.      Left lower leg: No edema.   Lymphadenopathy:      Cervical: No cervical adenopathy.   Skin:     Findings: No rash.   Neurological:      Mental Status: She is alert.      Result Review :Labs  Result Review  Imaging  Med Tab  Media                 Assessment and Plan CC Problem List  Visit Diagnosis  ROS  Review (Popup)  Health Maintenance  Quality  BestPractice  Medications  SmartSets  SnapShot Encounters  Media  Problem List Items Addressed This Visit          Medium    Reactive airways dysfunction syndrome    Overview     1 yr post covid hx of bilateral plerual effusions draining of right  persisitent small left effusion on xray 12/20/2021  inhaled steroids are continued now sxs are aggrvated by AR            Low    Chronic allergic rhinitis due to pollen    Overview     She is on immunRx   She is compliant with meds which are continue.  Avoidance, mask for yard mowing and follow up if no improvement.          Class 2 severe obesity due to excess calories with serious comorbidity and body mass index (BMI) of 36.0 to 36.9 in adult    Overview     Healthy diet and regular exercise encouraged.           Current Assessment & Plan     Patient's (Body mass index is 35.38 kg/m².) indicates that they are obese (BMI >30) with health " conditions that include dyslipidemias . Weight is unchanged. BMI  is above average; BMI management plan is completed. We discussed portion control and increasing exercise.          Primary osteoarthritis of left knee - Primary    Overview     Moderate on xray 05/08//2023, Ice heat, topical and po anti inflammatories. Not affective systemic steroids and follow   Ortho referral  if no improvement   Wt loss encouraged            Follow Up Instructions Charge Capture  Follow-up Communications  Return if symptoms worsen or fail to improve.  Patient was given instructions and counseling regarding her condition or for health maintenance advice. Please see specific information pulled into the AVS if appropriate.   Answers for HPI/ROS submitted by the patient on 5/16/2023  Please describe your symptoms.: Coughing and can not catch breath  need refill on symbicort . Think i have infection in my lungs. Couch up phleme  Have you had these symptoms before?: Yes  How long have you been having these symptoms?: Greater than 2 weeks  Please list any medications you are currently taking for this condition.: Out of inhaler  Please describe any probable cause for these symptoms. : Need to know what else i can do for a fast relief if infection  What is the primary reason for your visit?: Other

## 2023-05-18 LAB
DX ICD CODE: NORMAL
PATH REPORT.FINAL DX SPEC: NORMAL
PATH REPORT.GROSS SPEC: NORMAL
PATH REPORT.RELEVANT HX SPEC: NORMAL
PATH REPORT.SITE OF ORIGIN SPEC: NORMAL
PATHOLOGIST NAME: NORMAL
PAYMENT PROCEDURE: NORMAL

## 2023-05-19 DIAGNOSIS — Z12.11 COLON CANCER SCREENING: ICD-10-CM

## 2023-05-26 ENCOUNTER — OFFICE VISIT (OUTPATIENT)
Dept: FAMILY MEDICINE CLINIC | Facility: CLINIC | Age: 68
End: 2023-05-26
Payer: MEDICARE

## 2023-05-26 VITALS
TEMPERATURE: 96.9 F | WEIGHT: 218.2 LBS | HEART RATE: 74 BPM | BODY MASS INDEX: 35.07 KG/M2 | RESPIRATION RATE: 16 BRPM | OXYGEN SATURATION: 97 % | HEIGHT: 66 IN

## 2023-05-26 DIAGNOSIS — L98.9 SKIN LESION: Primary | ICD-10-CM

## 2023-05-26 RX ORDER — FEXOFENADINE HCL 180 MG/1
180 TABLET ORAL DAILY
COMMUNITY

## 2023-05-26 NOTE — PROGRESS NOTES
"Chief Complaint  Suture / Staple Removal    Subjective     CC  Problem List  Visit Diagnosis   Encounters  Notes  Medications  Labs  Result Review Imaging  Media    Montserrat Cueva presents to [facility] for   Suture / Staple Removal  The sutures were placed 7 to 10 days ago (05/17/2023, she underwent excsion of a lesion from her right mid distal calf. It has healed well. pathology showed focal fibosis and vascular hyperplasia, benign. ). She tried antibiotic ointment use since the wound repair. There has been no drainage from the wound. The redness has not changed (Has some redness around wound area.). There is no swelling present. There is no pain present. She has no difficulty moving the affected extremity or digit.       Review of Systems   Skin: Positive for skin lesions (s/p excision her for suture remval. ).        Objective   Vital Signs:   Pulse 74   Temp 96.9 °F (36.1 °C) (Temporal)   Resp 16   Ht 167.6 cm (66\")   Wt 99 kg (218 lb 3.2 oz)   SpO2 97% Comment: room air  BMI 35.22 kg/m²     Physical Exam  Skin:     Comments: The wound to the right mid posterior calf distally is healing well. Suture were removed without event. She will continue local wound care and f.u prn problems.         Result Review :Labs  Result Review  Imaging  Med Tab  Media                 Assessment and Plan CC Problem List  Visit Diagnosis  ROS  Review (Popup)  Health Maintenance  Quality  BestPractice  Medications  SmartSets  SnapShot Encounters  Media  Problem List Items Addressed This Visit    None  Visit Diagnoses     Skin lesion    -  Primary    Removed from right distal posterior calf. Healing well suture removed. Pathology bening. She will continue local wound care and f.u prn problems.           Follow Up Instructions Charge Capture  Follow-up Communications  Return if symptoms worsen or fail to improve.  Patient was given instructions and counseling " regarding her condition or for health maintenance advice. Please see specific information pulled into the AVS if appropriate.

## 2023-05-29 NOTE — ASSESSMENT & PLAN NOTE
Patient's (Body mass index is 35.96 kg/m².) indicates that they are obese (BMI >30) with health conditions that include dyslipidemias . Weight is unchanged. BMI  is above average; BMI management plan is completed. We discussed portion control and increasing exercise.

## 2023-06-11 NOTE — ASSESSMENT & PLAN NOTE
Patient's (Body mass index is 35.38 kg/m².) indicates that they are obese (BMI >30) with health conditions that include dyslipidemias . Weight is unchanged. BMI  is above average; BMI management plan is completed. We discussed portion control and increasing exercise.

## 2023-07-12 ENCOUNTER — TELEPHONE (OUTPATIENT)
Dept: FAMILY MEDICINE CLINIC | Facility: CLINIC | Age: 68
End: 2023-07-12

## 2023-07-12 NOTE — TELEPHONE ENCOUNTER
Mrs dumont called and said that she having hurting in the legs, left side by the knee cap. Wanted to try to get in but didn't see anything available right away. Call back [phone]

## 2023-07-17 PROBLEM — M17.0 PRIMARY OSTEOARTHRITIS OF BOTH KNEES: Status: ACTIVE | Noted: 2023-05-17

## 2023-10-09 ENCOUNTER — TELEPHONE (OUTPATIENT)
Dept: FAMILY MEDICINE CLINIC | Facility: CLINIC | Age: 68
End: 2023-10-09
Payer: MEDICARE

## 2023-10-09 DIAGNOSIS — J02.9 PHARYNGITIS, UNSPECIFIED ETIOLOGY: ICD-10-CM

## 2023-10-09 RX ORDER — AMOXICILLIN 500 MG/1
500 TABLET, FILM COATED ORAL 3 TIMES DAILY
Qty: 30 TABLET | Refills: 0 | Status: SHIPPED | OUTPATIENT
Start: 2023-10-09

## 2023-10-09 NOTE — TELEPHONE ENCOUNTER
Patient has been contacted that we will send in an antibiotic for her to take but after the completion of this is she is not feeling better then she needs to come in and be seen.

## 2023-10-09 NOTE — TELEPHONE ENCOUNTER
Caller: Montserrat Cueva    Relationship: Self    Best call back number: [phone]     What medication are you requesting: ANTIBIOTIC    What are your current symptoms: SORE THROAT    How long have you been experiencing symptoms: DAY 2    Have you had these symptoms before:    [x] Yes  [] No    Have you been treated for these symptoms before:   [x] Yes  [] No    If a prescription is needed, what is your preferred pharmacy and phone number:      Additional notes: [facility] - [place], [place] - [address]  - [phone] [facility] [phone] FX

## 2023-10-13 ENCOUNTER — TELEPHONE (OUTPATIENT)
Dept: FAMILY MEDICINE CLINIC | Facility: CLINIC | Age: 68
End: 2023-10-13
Payer: MEDICARE

## 2023-10-13 DIAGNOSIS — Z12.31 ENCOUNTER FOR SCREENING MAMMOGRAM FOR MALIGNANT NEOPLASM OF BREAST: Primary | ICD-10-CM

## 2023-10-13 NOTE — TELEPHONE ENCOUNTER
Called the patient back and let her know that we can drop in the mammogram for her and they will be calling her to get this scheduled  As for her labs it would be best for her to call us the week before her scheduled appointment and ask for us to drop in her labs as we don't want them to get canceled before the appointment. She said that she is going to call us that week and we can order them for her and she will get them done on either the Thursday and or Friday before her Monday appointment

## 2023-10-13 NOTE — TELEPHONE ENCOUNTER
Patient came into the office this morning to schedule her annual visit. She wanted to know if she needs lab work completed for it. She also is requesting to get a mammogram done and she would like that done downstairs. Thank you.

## 2023-10-30 ENCOUNTER — HOSPITAL ENCOUNTER (OUTPATIENT)
Dept: MAMMOGRAPHY | Facility: HOSPITAL | Age: 68
Discharge: HOME | End: 2023-10-30
Admitting: FAMILY MEDICINE
Payer: MEDICARE

## 2023-10-30 DIAGNOSIS — Z12.31 ENCOUNTER FOR SCREENING MAMMOGRAM FOR MALIGNANT NEOPLASM OF BREAST: ICD-10-CM

## 2023-10-30 PROCEDURE — 77067 SCR MAMMO BI INCL CAD: CPT

## 2023-10-30 PROCEDURE — 77063 BREAST TOMOSYNTHESIS BI: CPT

## 2023-10-30 NOTE — PROGRESS NOTES
[employer] message was sent   Good afternoon we have your mammogram back and per Dr. Alcazar   There are scattered areas of fibroglandular density, There are no suspicious masses  No mammographic signs of malignancy in the left breast. Recommend  routine mammographic screening in 1 year.

## 2023-11-03 ENCOUNTER — FLU SHOT (OUTPATIENT)
Dept: FAMILY MEDICINE CLINIC | Facility: CLINIC | Age: 68
End: 2023-11-03
Payer: MEDICARE

## 2023-11-03 DIAGNOSIS — Z23 NEED FOR INFLUENZA VACCINATION: Primary | ICD-10-CM

## 2023-11-03 PROCEDURE — 90662 IIV NO PRSV INCREASED AG IM: CPT | Performed by: FAMILY MEDICINE

## 2023-11-03 PROCEDURE — G0008 ADMIN INFLUENZA VIRUS VAC: HCPCS | Performed by: FAMILY MEDICINE

## 2023-11-20 DIAGNOSIS — R73.9 HYPERGLYCEMIA: ICD-10-CM

## 2023-11-20 DIAGNOSIS — Z00.00 MEDICARE ANNUAL WELLNESS VISIT, SUBSEQUENT: ICD-10-CM

## 2023-11-20 DIAGNOSIS — E78.2 MIXED HYPERLIPIDEMIA: Primary | ICD-10-CM

## 2023-11-21 LAB
ALBUMIN SERPL-MCNC: 4.2 G/DL (ref 3.9–4.9)
ALBUMIN/GLOB SERPL: 1.6 {RATIO} (ref 1.2–2.2)
ALP SERPL-CCNC: 86 IU/L (ref 44–121)
ALT SERPL-CCNC: 16 IU/L (ref 0–32)
AST SERPL-CCNC: 16 IU/L (ref 0–40)
BASOPHILS # BLD AUTO: 0 X10E3/UL (ref 0–0.2)
BASOPHILS NFR BLD AUTO: 0 %
BILIRUB SERPL-MCNC: 0.4 MG/DL (ref 0–1.2)
BUN SERPL-MCNC: 15 MG/DL (ref 8–27)
BUN/CREAT SERPL: 23 (ref 12–28)
CALCIUM SERPL-MCNC: 9.1 MG/DL (ref 8.7–10.3)
CHLORIDE SERPL-SCNC: 105 MMOL/L (ref 96–106)
CHOLEST SERPL-MCNC: 156 MG/DL (ref 100–199)
CHOLEST/HDLC SERPL: 2.8 RATIO (ref 0–4.4)
CO2 SERPL-SCNC: 23 MMOL/L (ref 20–29)
CREAT SERPL-MCNC: 0.64 MG/DL (ref 0.57–1)
EGFRCR SERPLBLD CKD-EPI 2021: 96 ML/MIN/1.73
EOSINOPHIL # BLD AUTO: 0.3 X10E3/UL (ref 0–0.4)
EOSINOPHIL NFR BLD AUTO: 4 %
ERYTHROCYTE [DISTWIDTH] IN BLOOD BY AUTOMATED COUNT: 13.3 % (ref 11.7–15.4)
GLOBULIN SER CALC-MCNC: 2.7 G/DL (ref 1.5–4.5)
GLUCOSE SERPL-MCNC: 93 MG/DL (ref 70–99)
HBA1C MFR BLD: 5.5 % (ref 4.8–5.6)
HCT VFR BLD AUTO: 40.7 % (ref 34–46.6)
HDLC SERPL-MCNC: 56 MG/DL
HGB BLD-MCNC: 13.1 G/DL (ref 11.1–15.9)
IMM GRANULOCYTES # BLD AUTO: 0 X10E3/UL (ref 0–0.1)
IMM GRANULOCYTES NFR BLD AUTO: 0 %
LDLC SERPL CALC-MCNC: 86 MG/DL (ref 0–99)
LYMPHOCYTES # BLD AUTO: 2 X10E3/UL (ref 0.7–3.1)
LYMPHOCYTES NFR BLD AUTO: 26 %
MCH RBC QN AUTO: 28.8 PG (ref 26.6–33)
MCHC RBC AUTO-ENTMCNC: 32.2 G/DL (ref 31.5–35.7)
MCV RBC AUTO: 90 FL (ref 79–97)
MONOCYTES # BLD AUTO: 0.7 X10E3/UL (ref 0.1–0.9)
MONOCYTES NFR BLD AUTO: 9 %
NEUTROPHILS # BLD AUTO: 4.6 X10E3/UL (ref 1.4–7)
NEUTROPHILS NFR BLD AUTO: 61 %
PLATELET # BLD AUTO: 293 X10E3/UL (ref 150–450)
POTASSIUM SERPL-SCNC: 4.6 MMOL/L (ref 3.5–5.2)
PROT SERPL-MCNC: 6.9 G/DL (ref 6–8.5)
RBC # BLD AUTO: 4.55 X10E6/UL (ref 3.77–5.28)
SODIUM SERPL-SCNC: 142 MMOL/L (ref 134–144)
TRIGL SERPL-MCNC: 71 MG/DL (ref 0–149)
TSH SERPL DL<=0.005 MIU/L-ACNC: 1.12 UIU/ML (ref 0.45–4.5)
VLDLC SERPL CALC-MCNC: 14 MG/DL (ref 5–40)
WBC # BLD AUTO: 7.6 X10E3/UL (ref 3.4–10.8)

## 2023-11-21 NOTE — PROGRESS NOTES
[employer] message sent  Good morning we have your labs back and per Dr. Alcazar  You have normal white blood cell count, normal platelet count ( this is the blood clotting factor) and no signs of anemia.   You have normal liver, kidney and thyroid function, your electrolyte function is normal as well. Your glucose was normal at 93 and your A1c was 5.5 which is normal as well.   Your total cholesterol was 156 this has decreased from the last time it was checked as it was 176, your triglycerides was at 71 this has decreased from the last time it was checked as it was 119, your HDL (healthy cholesterol) was 56 this has increased from the last time it was checked as it was 45( we want this to be as high as we can get it and we do this by exercise, exercise), your LDL ( lousy cholesterol) was 86 this has decreased from the last time it was checked as it was 110. Your total cholesterol/cardiac ratio was 2.8 this has decreased from the last time as it was 3.9.  Continue to work on diet along with exercise and taking your medications as prescribed, and we will check labs again in a year.

## 2023-11-27 NOTE — TELEPHONE ENCOUNTER
Caller: Montserrat Cueva    Relationship to patient: Self    Best call back number: [phone]     Date of exposure: 11/20/23    Date of positive COVID19 test: 11/26/23    Date if possible COVID19 exposure: 11/20/23    COVID19 symptoms: SORE THROAT, DRAINAGE, COUGH    Date of initial quarantine: 11/26/23    Additional information or concerns: PATIENT WOULD LIKE PAXLOVID. PLEASE CALL AND ADVISE.    What is the patients preferred pharmacy: [facility]  [place], [place] - [address]  - [phone] [facility] [phone] FX

## 2023-12-13 ENCOUNTER — TELEPHONE (OUTPATIENT)
Dept: FAMILY MEDICINE CLINIC | Facility: CLINIC | Age: 68
End: 2023-12-13
Payer: MEDICARE

## 2023-12-13 DIAGNOSIS — J06.9 VIRAL UPPER RESPIRATORY TRACT INFECTION: Primary | ICD-10-CM

## 2023-12-13 RX ORDER — AZITHROMYCIN 250 MG/1
TABLET, FILM COATED ORAL
Qty: 6 TABLET | Refills: 0 | Status: SHIPPED | OUTPATIENT
Start: 2023-12-13

## 2023-12-13 RX ORDER — PREDNISONE 10 MG/1
10 TABLET ORAL TAKE AS DIRECTED
Qty: 35 TABLET | Refills: 0 | Status: SHIPPED | OUTPATIENT
Start: 2023-12-13 | End: 2023-12-28

## 2023-12-13 NOTE — TELEPHONE ENCOUNTER
Caller: Montserrat Cueva    Relationship: Self    Best call back number: [phone]    What medication are you requesting: STEROID     What are your current symptoms: CONGESTION & COUGH     How long have you been experiencing symptoms: 4 DAYS     Have you had these symptoms before:    [x] Yes  [] No    Have you been treated for these symptoms before:   [x] Yes  [] No    If a prescription is needed, what is your preferred pharmacy and phone number: [facility], [place] - [address]  - [phone] [facility] [phone] FX     Additional notes: PT STATES THAT PCP TOLD HER WHEN SHE NEEDED A STEROID TO JUST CALL IN AND SHE WOULD PRESCRIBE ONE WITHOUT A VISIT.

## 2023-12-16 ENCOUNTER — APPOINTMENT (OUTPATIENT)
Dept: GENERAL RADIOLOGY | Facility: HOSPITAL | Age: 68
End: 2023-12-16
Payer: MEDICARE

## 2023-12-16 ENCOUNTER — HOSPITAL ENCOUNTER (EMERGENCY)
Facility: HOSPITAL | Age: 68
Discharge: HOME | End: 2023-12-16
Attending: EMERGENCY MEDICINE
Payer: MEDICARE

## 2023-12-16 VITALS
HEART RATE: 64 BPM | OXYGEN SATURATION: 90 % | SYSTOLIC BLOOD PRESSURE: 147 MMHG | HEIGHT: 67 IN | DIASTOLIC BLOOD PRESSURE: 75 MMHG | BODY MASS INDEX: 35.74 KG/M2 | RESPIRATION RATE: 16 BRPM | WEIGHT: 227.74 LBS | TEMPERATURE: 98.2 F

## 2023-12-16 DIAGNOSIS — U07.1 COVID-19: Primary | ICD-10-CM

## 2023-12-16 DIAGNOSIS — R05.1 ACUTE COUGH: ICD-10-CM

## 2023-12-16 DIAGNOSIS — J20.9 ACUTE BRONCHITIS, UNSPECIFIED ORGANISM: ICD-10-CM

## 2023-12-16 LAB
DEPRECATED RDW RBC AUTO: 48.1 FL (ref 37–54)
ERYTHROCYTE [DISTWIDTH] IN BLOOD BY AUTOMATED COUNT: 14.7 % (ref 12.3–15.4)
HCT VFR BLD AUTO: 40.3 % (ref 34–46.6)
HGB BLD-MCNC: 13.2 G/DL (ref 12–15.9)
LYMPHOCYTES # BLD MANUAL: 0.41 10*3/MM3 (ref 0.7–3.1)
LYMPHOCYTES NFR BLD MANUAL: 8 % (ref 5–12)
MCH RBC QN AUTO: 29 PG (ref 26.6–33)
MCHC RBC AUTO-ENTMCNC: 32.7 G/DL (ref 31.5–35.7)
MCV RBC AUTO: 88.6 FL (ref 79–97)
METAMYELOCYTES NFR BLD MANUAL: 2 % (ref 0–0)
MONOCYTES # BLD: 1.1 10*3/MM3 (ref 0.1–0.9)
NEUTROPHILS # BLD AUTO: 11.92 10*3/MM3 (ref 1.7–7)
NEUTROPHILS NFR BLD MANUAL: 87 % (ref 42.7–76)
PLAT MORPH BLD: NORMAL
PLATELET # BLD AUTO: 293 10*3/MM3 (ref 140–450)
PMV BLD AUTO: 9.1 FL (ref 6–12)
PROCALCITONIN SERPL-MCNC: 0.03 NG/ML (ref 0–0.25)
RBC # BLD AUTO: 4.54 10*6/MM3 (ref 3.77–5.28)
RBC MORPH BLD: NORMAL
SCAN SLIDE: NORMAL
VARIANT LYMPHS NFR BLD MANUAL: 3 % (ref 19.6–45.3)
WBC MORPH BLD: NORMAL
WBC NRBC COR # BLD AUTO: 13.7 10*3/MM3 (ref 3.4–10.8)

## 2023-12-16 PROCEDURE — 85007 BL SMEAR W/DIFF WBC COUNT: CPT | Performed by: PHYSICIAN ASSISTANT

## 2023-12-16 PROCEDURE — 99283 EMERGENCY DEPT VISIT LOW MDM: CPT

## 2023-12-16 PROCEDURE — 84145 PROCALCITONIN (PCT): CPT | Performed by: PHYSICIAN ASSISTANT

## 2023-12-16 PROCEDURE — 71045 X-RAY EXAM CHEST 1 VIEW: CPT

## 2023-12-16 PROCEDURE — 85025 COMPLETE CBC W/AUTO DIFF WBC: CPT | Performed by: PHYSICIAN ASSISTANT

## 2023-12-16 RX ORDER — SODIUM CHLORIDE 0.9 % (FLUSH) 0.9 %
10 SYRINGE (ML) INJECTION AS NEEDED
Status: DISCONTINUED | OUTPATIENT
Start: 2023-12-16 | End: 2023-12-16 | Stop reason: HOSPADM

## 2023-12-16 RX ORDER — BENZONATATE 200 MG/1
200 CAPSULE ORAL 3 TIMES DAILY PRN
Qty: 30 CAPSULE | Refills: 0 | Status: SHIPPED | OUTPATIENT
Start: 2023-12-16 | End: 2023-12-20

## 2023-12-16 RX ORDER — ALBUTEROL SULFATE 90 UG/1
2 AEROSOL, METERED RESPIRATORY (INHALATION) EVERY 4 HOURS PRN
Qty: 6.7 G | Refills: 0 | Status: SHIPPED | OUTPATIENT
Start: 2023-12-16

## 2023-12-16 RX ORDER — ALBUTEROL SULFATE 90 UG/1
2 AEROSOL, METERED RESPIRATORY (INHALATION) EVERY 4 HOURS PRN
Qty: 6.7 G | Refills: 0 | Status: SHIPPED | OUTPATIENT
Start: 2023-12-16 | End: 2023-12-16 | Stop reason: SDUPTHER

## 2023-12-16 RX ORDER — BENZONATATE 200 MG/1
200 CAPSULE ORAL 3 TIMES DAILY PRN
Qty: 30 CAPSULE | Refills: 0 | Status: SHIPPED | OUTPATIENT
Start: 2023-12-16 | End: 2023-12-16 | Stop reason: SDUPTHER

## 2023-12-16 NOTE — Clinical Note
[facility] EMERGENCY DEPARTMENT  [address] [place] [address]  Phone: [phone]    Montserrat Cueva was seen and treated in our emergency department on 12/16/2023.  She may return to work on 12/17/2023.         Thank you for choosing [facility].    James Iglesias PA

## 2023-12-16 NOTE — DISCHARGE INSTRUCTIONS
Please use albuterol as needed for wheezing and shortness of breath.  Please take Tessalon Perles for cough as needed.  Please take this in addition to Mucinex/guaifenesin.  Please come into the ER spiked high fever or have severely worsening symptoms.  Can follow-up with pulmonology, Dr. Payne above.

## 2023-12-16 NOTE — ED PROVIDER NOTES
Subjective   History of Present Illness    Patient is 68-year-old female comes in complaining of cough for the past 10 days.  Patient states that she was diagnosed with COVID about 2 weeks ago.  Patient states that her primary care provider had called in a steroid Dosepak as well as azithromycin a few days ago.  Patient states she has had persistent cough and having bad coughing fits.  Patient otherwise denies any shortness of breath, chest pain, fever or sore throat.  Patient does not appear in any respiratory distress.    Review of Systems   Constitutional:  Negative for chills, fatigue and fever.   HENT:  Negative for congestion, sore throat, tinnitus and trouble swallowing.    Eyes:  Negative for photophobia, discharge and visual disturbance.   Respiratory:  Positive for cough. Negative for shortness of breath.    Cardiovascular:  Negative for chest pain and leg swelling.   Gastrointestinal:  Negative for abdominal pain, diarrhea, nausea and vomiting.   Genitourinary:  Negative for dysuria, flank pain and urgency.   Musculoskeletal:  Negative for arthralgias and myalgias.   Skin:  Negative for rash.   Neurological:  Negative for dizziness and headaches.   Psychiatric/Behavioral:  Negative for confusion.        Past Medical History:   Diagnosis Date    Asymptomatic menopausal state     Impression: dexa scan 02/12/2014. WNL Repeat 2019    Atrial fibrillation     Breast cancer     right    Chronic allergic rhinitis due to pollen     Disorder of bone and cartilage     Impression: -0.3 spine, -0.7 FN, -1.3 hip, 11/2009 Wt bearing exercise encouraged 11/09    Drug therapy     6 months in 1993    History of malignant neoplasm of female breast     Impression: stage II, s/p right modified mastectomy    Hydronephrosis of left kidney     Hyperlipidemia, unspecified     Impression: Stable on meds.    Menopausal syndrome     Impression: stage II, s/p right modified mastectomy    Microscopic hematuria     Impression:  previously negative cx No hx of stones.    Plantar fasciitis, right     Impression: She is receiving orthospec from her podiatrist.       No Known Allergies    Past Surgical History:   Procedure Laterality Date    BREAST BIOPSY      HYSTERECTOMY  08/1990    Right ovary removal, Cyst    MASTECTOMY MODIFIED RADICAL / SIMPLE / COMPLETE Right        Family History   Problem Relation Age of Onset    Heart disease Father         Hehad a heart attack    Stroke Father     Heart disease Brother         Had a heart transplant because of cardiomyopathy    Kidney disease Brother     Leukemia Son     Breast cancer Neg Hx     Ovarian cancer Neg Hx        Social History     Socioeconomic History    Marital status:    Tobacco Use    Smoking status: Never    Smokeless tobacco: Never   Vaping Use    Vaping Use: Never used   Substance and Sexual Activity    Alcohol use: No    Drug use: No    Sexual activity: Defer           Objective   Physical Exam  Vitals and nursing note reviewed.   Constitutional:       General: She is not in acute distress.     Appearance: Normal appearance. She is well-developed. She is not diaphoretic.   HENT:      Head: Normocephalic and atraumatic.      Right Ear: External ear normal.      Left Ear: External ear normal.      Nose: Nose normal.      Mouth/Throat:      Mouth: Mucous membranes are moist.   Eyes:      Extraocular Movements: Extraocular movements intact.      Conjunctiva/sclera: Conjunctivae normal.      Pupils: Pupils are equal, round, and reactive to light.   Cardiovascular:      Rate and Rhythm: Normal rate and regular rhythm.      Pulses: Normal pulses.      Heart sounds: Normal heart sounds.      Comments: S1, S2 audible.  Pulmonary:      Effort: Pulmonary effort is normal. No respiratory distress.      Breath sounds: Normal breath sounds. No stridor. No wheezing, rhonchi or rales.      Comments: On room air.  Chest:      Chest wall: No tenderness.   Abdominal:      General: Bowel  sounds are normal. There is no distension.      Palpations: Abdomen is soft.      Tenderness: There is no abdominal tenderness.   Musculoskeletal:         General: No tenderness or deformity. Normal range of motion.      Cervical back: Normal range of motion.   Skin:     General: Skin is warm.      Capillary Refill: Capillary refill takes less than 2 seconds.      Findings: No erythema or rash.   Neurological:      Mental Status: She is alert and oriented to person, place, and time.      Cranial Nerves: No cranial nerve deficit.   Psychiatric:         Mood and Affect: Mood normal.         Behavior: Behavior normal.         Procedures           ED Course  ED Course as of 12/16/23 1913   Sat Dec 16, 2023   1816 Chest x-ray and apparently interpreted by myself shows no obvious pulmonary infiltrates.  Borderline cardiomegaly. [RL]   1835 BMP had hemolyzed and this test was deferred as patient does not want to wait for this. [RL]      ED Course User Index  [RL] James Iglesias PA                                   Labs Reviewed   CBC WITH AUTO DIFFERENTIAL - Abnormal; Notable for the following components:       Result Value    WBC 13.70 (*)     All other components within normal limits    Narrative:     The previously reported component NRBC is no longer being reported. Previous result was 0.0 /100 WBC (Reference Range: 0.0-0.2 /100 WBC) on 12/16/2023 at 1707 EST.   MANUAL DIFFERENTIAL - Abnormal; Notable for the following components:    Neutrophil % 87.0 (*)     Lymphocyte % 3.0 (*)     Metamyelocyte % 2.0 (*)     Neutrophils Absolute 11.92 (*)     Lymphocytes Absolute 0.41 (*)     Monocytes Absolute 1.10 (*)     All other components within normal limits   PROCALCITONIN - Normal    Narrative:     As a Marker for Sepsis (Non-Neonates):    1. <0.5 ng/mL represents a low risk of severe sepsis and/or septic shock.  2. >2 ng/mL represents a high risk of severe sepsis and/or septic shock.    As a Marker for Lower Respiratory Tract  "Infections that require antibiotic therapy:    PCT on Admission    Antibiotic Therapy       6-12 Hrs later    >0.5                Strongly Recommended  >0.25 - <0.5        Recommended   0.1 - 0.25          Discouraged              Remeasure/reassess PCT  <0.1                Strongly Discouraged     Remeasure/reassess PCT    As 28 day mortality risk marker: \"Change in Procalcitonin Result\" (>80% or <=80%) if Day 0 (or Day 1) and Day 4 values are available. Refer to http://www.[facility]-pct-calculator.com    Change in PCT <=80%  A decrease of PCT levels below or equal to 80% defines a positive change in PCT test result representing a higher risk for 28-day all-cause mortality of patients diagnosed with severe sepsis for septic shock.    Change in PCT >80%  A decrease of PCT levels of more than 80% defines a negative change in PCT result representing a lower risk for 28-day all-cause mortality of patients diagnosed with severe sepsis or septic shock.      SCAN SLIDE   CBC AND DIFFERENTIAL    Narrative:     The following orders were created for panel order CBC & Differential.  Procedure                               Abnormality         Status                     ---------                               -----------         ------                     CBC Auto Differential[[id number]]        Abnormal            Final result               Scan Slide[[id number]]                                       Final result                 Please view results for these tests on the individual orders.               Medical Decision Making  Problems Addressed:  Acute bronchitis, unspecified organism: complicated acute illness or injury  COVID-19: complicated acute illness or injury    Amount and/or Complexity of Data Reviewed  Labs: ordered.  Radiology: ordered.    Risk  Prescription drug management.      Differential diagnosis: Acute bronchitis, COVID19, PNA, not ment to be an all inclusive list.  Chart review: Upon chart review, last family " "medicine note by Dr. Alcazar on 7/17/2023, patient was seen for skin lesion of right lower leg.    EKG: Not indicated    Imaging: If applicable see my interpretation above.  XR Chest 1 View   Final Result   Impression:   Stable chest without acute cardiopulmonary process.         Electronically Signed: Latha Medina MD     12/16/2023 4:09 PM CST     Workstation ID: [id number]        Labs:  Lab work independently interpreted by myself shows CBC shows white count of 13.7 and is consistent with patient being on steroids currently.  Procalcitonin normal.    Vitals:  /75 (BP Location: Left arm, Patient Position: Lying)   Pulse 64   Temp 98.2 °F (36.8 °C) (Oral)   Resp 16   Ht 170.2 cm (67\")   Wt 103 kg (227 lb 11.8 oz)   LMP 01/01/1985   SpO2 90%   BMI 35.67 kg/m²    Medications given:    Medications   sodium chloride 0.9 % flush 10 mL (has no administration in time range)       Procedures:  not indicated    MDM: Patient is 68-year-old female comes in complaining of cough and recent diagnosis with COVID-19 2 weeks ago. Lab work independently interpreted by myself shows CBC shows white count of 13.7 and is consistent with patient being on steroids currently.  Procalcitonin normal.  Patient had previously been prescribed Paxlovid for this bout of COVID-19 however she did not end up taking it.  Earlier in the week, patient was sent in a steroid course and azithromycin which she has a few days left of her steroid course and 1 day left of her azithromycin.  I discussed with patient strict return precautions and sent Tessalon Perles and albuterol to patient's pharmacy as patient needed refill of her albuterol.  Patient has no active wheezes on exam.  Patient.  No respiratory distress and ambulated without issue to the bathroom and back.  BMP was ordered but hemolyzed and patient did not want to wait for a recollect.  Patient is afebrile here and no signs of sepsis at this time.see full discharge instructions for " further details. Plan discussed with patient and is agreeable with plan.      Final diagnoses:   COVID-19   Acute bronchitis, unspecified organism   Acute cough       ED Disposition  ED Disposition       ED Disposition   Discharge    Condition   Stable    Comment   --               [facility] EMERGENCY DEPARTMENT  [address] [address]  [phone]  Go in 1 day  As needed, If symptoms worsen    Nyla Alcazar MD  [address]  [place] [id number] [phone]    Schedule an appointment as soon as possible for a visit in 1 week  As needed, If symptoms worsen    Maciel Payne MD  [address]  [place] [id number] [phone]    Schedule an appointment as soon as possible for a visit in 1 week  As needed, If symptoms worsen         Medication List        New Prescriptions      benzonatate 200 MG capsule  Commonly known as: TESSALON  Take 1 capsule by mouth 3 (Three) Times a Day As Needed for Cough.            Changed      * albuterol sulfate  (90 Base) MCG/ACT inhaler  Commonly known as: PROVENTIL HFA;VENTOLIN HFA;PROAIR HFA  What changed: Another medication with the same name was added. Make sure you understand how and when to take each.     * albuterol sulfate  (90 Base) MCG/ACT inhaler  Commonly known as: PROVENTIL HFA;VENTOLIN HFA;PROAIR HFA  Inhale 2 puffs Every 4 (Four) Hours As Needed for Wheezing or Shortness of Air.  What changed: You were already taking a medication with the same name, and this prescription was added. Make sure you understand how and when to take each.           * This list has 2 medication(s) that are the same as other medications prescribed for you. Read the directions carefully, and ask your doctor or other care provider to review them with you.                   Where to Get Your Medications        These medications were sent to [facility]/pharmacy #[id number] - [place], [place] - [address] [phone] [place] [phone] FX   [address] [place] [address]      Phone: [phone]   albuterol sulfate  (90 Base) MCG/ACT inhaler  benzonatate 200 MG capsule            James Iglesias PA  12/16/23 1913

## 2023-12-18 NOTE — PROGRESS NOTES
Chief Complaint  Medicare Wellness-subsequent, Hyperlipidemia, Atrial Fibrillation, Allergies, and Obesity    Subjective     CC  Problem List  Visit Diagnosis   Encounters  Notes  Medications  Labs  Result Review Imaging  Media    Montserrat Cueva presents to [facility] for   History of Present Illness  Montserrat was seen at [facility]  on 12/16/2023. She was seen for cough. Labs that were performed during the encounter included: CBC-WBC 13.7. Diagnostic studies that were performed included: Chest x-ray-No obvious pulmonary infiltrates.  Medication changes: Tessalon pearls 200mg, Albuterol Sulfate HFA in [id number] mcg.   Atrial Fibrillation  Presents for follow-up visit. Symptoms are negative for chest pain, dizziness, palpitations, shortness of breath and weakness. The symptoms have been stable. Past medical history includes atrial fibrillation and hyperlipidemia. There are no medication compliance problems.   Hyperlipidemia  This is a chronic problem. The current episode started more than 1 year ago. Exacerbating diseases include obesity. She has no history of diabetes or hypothyroidism. Pertinent negatives include no chest pain, focal sensory loss, focal weakness, leg pain, myalgias or shortness of breath. Current antihyperlipidemic treatment includes statins. There are no compliance problems.  Risk factors for coronary artery disease include dyslipidemia, obesity, post-menopausal and family history.       Review of Systems   Constitutional:  Negative for activity change, appetite change, fatigue, fever, unexpected weight gain and unexpected weight loss.   HENT:  Negative for congestion, ear pain, hearing loss, rhinorrhea, sinus pressure, sore throat, swollen glands, trouble swallowing and voice change.    Eyes:  Negative for visual disturbance.   Respiratory:  Negative for apnea, cough, shortness of breath and wheezing.    Cardiovascular:  Negative for chest pain  "and palpitations.   Gastrointestinal:  Negative for abdominal pain, blood in stool, constipation, diarrhea, nausea, vomiting and indigestion.   Endocrine: Negative for cold intolerance, heat intolerance, polydipsia and polyuria.   Genitourinary:  Negative for breast discharge, breast lump, breast pain, dysuria, flank pain, frequency, pelvic pain and vaginal bleeding.   Musculoskeletal:  Positive for arthralgias (multiple). Negative for joint swelling and myalgias.   Skin:  Negative for rash, skin lesions and wound.   Allergic/Immunologic: Negative for environmental allergies and immunocompromised state.   Neurological:  Negative for dizziness, focal weakness, syncope, weakness, numbness, headache and memory problem.   Hematological:  Negative for adenopathy. Does not bruise/bleed easily.   Psychiatric/Behavioral:  Negative for suicidal ideas and depressed mood. The patient is not nervous/anxious.         Objective   Vital Signs:   /78 (BP Location: Left arm, Patient Position: Sitting, Cuff Size: Adult)   Pulse 96   Temp 97.3 °F (36.3 °C) (Infrared)   Resp 20   Ht 170.2 cm (67\")   Wt 103 kg (228 lb)   SpO2 100% Comment: room air  BMI 35.71 kg/m²     Physical Exam  Constitutional:       General: She is not in acute distress.     Appearance: She is well-developed.   HENT:      Head: Normocephalic. Hair is normal.      Right Ear: Tympanic membrane and external ear normal.      Left Ear: Tympanic membrane and external ear normal.      Nose: Nose normal. No mucosal edema.      Mouth/Throat:      Pharynx: Uvula midline.   Eyes:      General:         Right eye: No discharge.         Left eye: No discharge.      Conjunctiva/sclera: Conjunctivae normal.      Pupils: Pupils are equal, round, and reactive to light.   Neck:      Thyroid: No thyromegaly.      Vascular: No JVD.   Cardiovascular:      Rate and Rhythm: Normal rate and regular rhythm.      Chest Wall: PMI is not displaced.      Pulses:           Carotid " pulses are 2+ on the right side and 2+ on the left side.       Femoral pulses are 2+ on the right side and 2+ on the left side.       Dorsalis pedis pulses are 2+ on the right side and 2+ on the left side.      Heart sounds: Normal heart sounds. No murmur heard.     No friction rub. No gallop.      Comments: Negative Homans' no edema  Pulmonary:      Effort: Pulmonary effort is normal. No respiratory distress.      Breath sounds: Normal breath sounds. No decreased breath sounds, wheezing, rhonchi or rales.   Chest:   Breasts:     Breasts are symmetrical.      Right: No inverted nipple, mass, nipple discharge, skin change or tenderness.      Left: No inverted nipple, mass, nipple discharge, skin change or tenderness.   Abdominal:      General: Bowel sounds are normal. There is no distension or abdominal bruit.      Palpations: Abdomen is soft. There is no mass.      Tenderness: There is no abdominal tenderness.   Musculoskeletal:         General: Deformity (knees and distal fingers, with decreased ROM.) present. No tenderness.      Cervical back: Normal range of motion and neck supple. No muscular tenderness.   Lymphadenopathy:      Cervical: No cervical adenopathy.      Upper Body:      Right upper body: No supraclavicular adenopathy.      Left upper body: No supraclavicular adenopathy.   Skin:     General: Skin is warm and dry.      Findings: No ecchymosis, erythema, lesion or rash.   Neurological:      Mental Status: She is alert and oriented to person, place, and time.      Cranial Nerves: No cranial nerve deficit.      Sensory: No sensory deficit.      Motor: No abnormal muscle tone.      Coordination: Coordination normal.      Deep Tendon Reflexes: Reflexes are normal and symmetric. Reflexes normal.   Psychiatric:         Speech: Speech normal.         Behavior: Behavior normal.         Thought Content: Thought content normal. Thought content does not include suicidal ideation.         Cognition and Memory: She  does not exhibit impaired recent memory or impaired remote memory.         Judgment: Judgment normal. Judgment is not impulsive.        Result Review :Labs  Result Review  Imaging  Med Tab  Media          Skin Excision    Date/Time: 12/13/2023 6:36 PM    Performed by: Nyla Alcazar MD  Authorized by: Nyla Alcazar MD    Consent:     Consent obtained:  Written    Consent given by:  Patient    Risks discussed:  Bleeding and infection    Alternatives discussed:  Referral  Pre-procedure details:     Preparation: Patient was prepped and draped in usual sterile fashion    Anesthesia:     Anesthesia method:  Local infiltration    Local anesthetic:  Lidocaine 2% WITH epi  Procedure details:     Head/Neck Location:  Scalp    Malignancy: benign lesion      Initial Size:  10 (mm)    Final defect size (mm):  10  Post-procedure details:     Patient tolerance of procedure:  Tolerated well, no immediate complications  Comments:      There is a 5 mm round soft nodules in the left occiput consistent with an inclusion cyst.   The lesion was cleansed with betadine, anesthestized with 2 % lidocaine with epinephrine. Understerile, conditions the lesion was excised by shaving and sent  sent for pathology. Bleeding was mild and controlled with cautery The specimen was sent for pathology. She received wound care precautions. Suture removal in 14 days.         Assessment and Plan CC Problem List  Visit Diagnosis  ROS  Review (Popup)  Health Maintenance  Quality  BestPractice  Medications  SmartSets  SnapShot Encounters  Media  Problem List Items Addressed This Visit          High    Paroxysmal atrial fibrillation    Overview     Onset 01/08/2021  No recurrence she is off anti-arrthymic, and anti coagulation   ASA 81 mg  She is followed with [facility]   Sinus Rhythm by exam,    Occurrence likely post covid inflammation which is documented w/ hx of pleural effusion.  No hx of recurrence to date 12/20/2023            Medium     Mixed hyperlipidemia    Overview     Controlled compliant with meds.         Current Assessment & Plan     Lipid abnormalities are improving with treatment.  Pharmacotherapy as ordered.  Lipids will be reassessed in 1 year.         Reactive airways dysfunction syndrome    Overview     1 yr post covid hx of bilateral plerual effusions draining of right  pleural effusion. Neg CXR 12/17/2023  Contine inhaled steroids prn rescue inhalers.             Low    Chronic allergic rhinitis due to pollen    Overview     She is on immunRx   She is compliant with meds which are continue.  Avoidance, mask for yard mowing and follow up if no improvement          History of malignant neoplasm of female breast    Overview     stage II, s/p right modified mastectomy 1993, negative exam         Class 2 severe obesity due to excess calories with serious comorbidity and body mass index (BMI) of 35.0 to 35.9 in adult    Overview     Healthy diet and regular exercise encouraged.           Current Assessment & Plan     Patient's (Body mass index is 35.71 kg/m².) indicates that they are obese (BMI >30) with health conditions that include hypertension and dyslipidemias . Weight is improving with treatment. BMI  is above average; BMI management plan is completed. We discussed portion control and increasing exercise.          Primary osteoarthritis of both knees    Overview     Mild to moderate on xay Left exacerbation of pain. Ice heat, topical and po anti inflammatories. Not affective systemic steroids and follow   Ortho referral  if no improvement   Wt loss encouraged  Moderate right on xray 05/08//2023,            Unprioritized    Asymptomatic menopausal state    Overview     dexa scan 02/12/2014. WNL Repeat 2019         Screening for malignant neoplasm of cervix    Overview     S/P KRZYSZTOF BSO for fibroids.  Pap smear 01/04/2013. WNL         Medicare annual wellness visit, subsequent - Primary    Overview     Diet exercise breast self exam,  seat belts sunscreens, left breast self exam, fall prevention, and general safety. Present lab reviewed in detail. .         Encounter for screening mammogram for malignant neoplasm of breast    Overview     Last mammogram 10/30/2023 Normal Left breast mammogram and recommended normal screening in 1 year.          Colon cancer screening    Overview     Cologuard 10/17/2019- negative. Repeat 2022          Other Visit Diagnoses       Urinary frequency        Relevant Orders    Urinalysis With Microscopic - Urine, Clean Catch (Completed)    Other seborrheic keratosis        Relevant Orders    Reference Histopathology (Completed)    Skin Excision            Follow Up Instructions Charge Capture  Follow-up Communications  Return in about 1 year (around 12/20/2024).  Patient was given instructions and counseling regarding her condition or for health maintenance advice. Please see specific information pulled into the AVS if appropriate.

## 2023-12-18 NOTE — PROGRESS NOTES
The ABCs of the Annual Wellness Visit  Subsequent Medicare Wellness Visit    No chief complaint on file.      Subjective   History of Present Illness:  Montserrat Cueva is a 68 y.o. female who presents for a Subsequent Medicare Wellness Visit. The patient is here: for coordination of medical care to discuss health maintenance and disease prevention to follow up on multiple medical problems. Overall has: moderate activity with work/home activities, exercises 2 - 3 times per week, good appetite, feels well with minor complaints, good energy level, is sleeping well, and is sleeping poorly. Nutrition: appropriate balanced diet and eating a variety of foods. Last tetanus shot was   .Montserrat Cueva is -4, Parity-4. Patient's last menstrual period was 1985. She is postmenopausal.      HPI    HEALTH RISK ASSESSMENT    Recent Hospitalizations:  No hospitalization(s) within the last year.    Current Medical Providers:  Patient Care Team:  Nyla Alcazar MD as PCP - General  OttonielNyla MD as PCP - Family Medicine  Amilcar Raya MD as Consulting Physician (Cardiology)  Luis Carbajal MD as Consulting Physician (Allergy)    Smoking Status:  Social History     Tobacco Use   Smoking Status Never   Smokeless Tobacco Never       Alcohol Consumption:  Social History     Substance and Sexual Activity   Alcohol Use No       Depression Screen:       2022     8:00 AM   PHQ-2/PHQ-9 Depression Screening   Little Interest or Pleasure in Doing Things 0-->not at all   Feeling Down, Depressed or Hopeless 0-->not at all   PHQ-9: Brief Depression Severity Measure Score 0       I spent less than 16 minutes asking patient questions, counseling and documenting in the chart    Fall Risk Screen:  STEADI Fall Risk Assessment has not been completed.    Health Habits and Functional and Cognitive Screenin/13/2023     7:53 AM   Functional & Cognitive Status   Do you have difficulty preparing food and eating?  No   Do you have difficulty bathing yourself, getting dressed or grooming yourself? No   Do you have difficulty using the toilet? No   Do you have difficulty moving around from place to place? No   Do you have trouble with steps or getting out of a bed or a chair? No   Current Diet Limited Junk Food   Dental Exam Up to date   Eye Exam Up to date   Exercise (times per week) 2 times per week   Do you need help using the phone?  No   Are you deaf or do you have serious difficulty hearing?  No   Do you need help to go to places out of walking distance? No   Do you need help shopping? No   Do you need help preparing meals?  No   Do you need help with housework?  No   Do you need help with laundry? No   Do you need help taking your medications? No   Do you need help managing money? No   Do you ever drive or ride in a car without wearing a seat belt? No   Have you felt unusual stress, anger or loneliness in the last month? No   Who do you live with? Spouse   If you need help, do you have trouble finding someone available to you? No   Have you been bothered in the last four weeks by sexual problems? No   Do you have difficulty concentrating, remembering or making decisions? No       Does the patient have evidence of cognitive impairment? No    Asprin use counseling:Taking ASA appropriately as indicated    Recent Lab Results:  Lab Results   Component Value Date    CHLPL 156 11/20/2023    TRIG 71 11/20/2023    HDL 56 11/20/2023    LDL 86 11/20/2023    VLDL 14 11/20/2023    HGBA1C 5.5 11/20/2023        Age-appropriate Screening Schedule:  Refer to the list below for future screening recommendations based on patient's age, sex and/or medical conditions. Orders for these recommended tests are listed in the plan section. The patient has been provided with a written plan.    Health Maintenance   Topic Date Due    ZOSTER VACCINE (2 of 3) 01/13/2016    TDAP/TD VACCINES (3 - Td or Tdap) 04/12/2022    COVID-19 Vaccine (2 - 2023-24  season) 09/01/2023    ANNUAL WELLNESS VISIT  11/21/2023    BMI FOLLOWUP  07/17/2024    LIPID PANEL  11/20/2024    DXA SCAN  11/28/2024    MAMMOGRAM  10/30/2025    COLORECTAL CANCER SCREENING  01/03/2026    HEPATITIS C SCREENING  Completed    INFLUENZA VACCINE  Completed    Pneumococcal Vaccine 65+  Completed          The following portions of the patient's history were reviewed and updated as appropriate: allergies, current medications, past family history, past medical history, past social history, past surgical history, and problem list.    Outpatient Medications Prior to Visit   Medication Sig Dispense Refill    albuterol sulfate  (90 Base) MCG/ACT inhaler       albuterol sulfate  (90 Base) MCG/ACT inhaler Inhale 2 puffs Every 4 (Four) Hours As Needed for Wheezing or Shortness of Air. 6.7 g 0    amoxicillin (AMOXIL) 500 MG tablet Take 1 tablet by mouth 3 (Three) Times a Day. 30 tablet 0    Ascorbic Acid (Vitamin C) 500 MG capsule Take  by mouth.      aspirin 81 MG EC tablet Take 1 tablet by mouth Daily.      azithromycin (ZITHROMAX) 250 MG tablet Take 2 tablets the first day, then 1 tablet daily for 4 days. 6 tablet 0    benzonatate (TESSALON) 200 MG capsule Take 1 capsule by mouth 3 (Three) Times a Day As Needed for Cough. 30 capsule 0    budesonide-formoterol (SYMBICORT) 160-4.5 MCG/ACT inhaler Inhale 2 puffs 2 (Two) Times a Day.      cholecalciferol (VITAMIN D3) 25 MCG (1000 UT) tablet Take 1 tablet by mouth Daily.      EPINEPHrine (EPIPEN) 0.3 MG/0.3ML solution auto-injector injection Inject 0.3 mL into the appropriate muscle as directed by prescriber As Needed for Allergies.  3    fexofenadine (ALLEGRA) 180 MG tablet Take 1 tablet by mouth Daily.      fluticasone (FLONASE) 50 MCG/ACT nasal spray 2 sprays into the nostril(s) as directed by provider Daily. 16 g 12    montelukast (SINGULAIR) 10 MG tablet Take 1 tablet by mouth Every Night. 90 tablet 3    Multiple Vitamins-Minerals (ZINC PO) Take 1  tablet by mouth Daily.      Nirmatrelvir&Ritonavir 300/100 (PAXLOVID) 20 x 150 MG & 10 x 100MG tablet therapy pack tablet Take 3 tablets by mouth 2 (Two) Times a Day. 30 tablet 0    predniSONE (DELTASONE) 10 MG tablet Take 1 tablet by mouth Take As Directed for 15 days. 5 tab x 1day, 4 tab x 2 day, 3 tab x 3 day, 2 tab x 4 day, 1 tab x 5 day 35 tablet 0    rosuvastatin (CRESTOR) 5 MG tablet Take 1 tablet by mouth Daily. 90 tablet 3    Turmeric Curcumin 500 MG capsule Take  by mouth.      Zinc 50 MG tablet Take  by mouth.       No facility-administered medications prior to visit.       Patient Active Problem List   Diagnosis    Chronic allergic rhinitis due to pollen    History of malignant neoplasm of female breast    Mixed hyperlipidemia    Asymptomatic menopausal state    Class 2 severe obesity due to excess calories with serious comorbidity and body mass index (BMI) of 35.0 to 35.9 in adult    Screening for malignant neoplasm of cervix    Medicare annual wellness visit, subsequent    Encounter for screening mammogram for malignant neoplasm of breast    Colon cancer screening    Paroxysmal atrial fibrillation    History of COVID-19    Reactive airways dysfunction syndrome    Primary osteoarthritis of both knees       Advanced Care Planning:  ACP discussion was declined by the patient. Patient does not have an advance directive, declines further assistance.    A face-to-face visit was completed today with patient.  Counseling explanation, and discussion of advanced directives was performed.   The last advanced care visit was performed in 2022.  In a near life ending situation, from which she is not expected to recover functionally, and she is not able to speak for herself, she does not want life sustaining measures. We discussed feeding tubes, ventilators and cardiac support as well as dialysis.    I spent less than 16 minutes discussing Advanced Care Planning information and documenting in the chart.    Review of  Systems    I have reviewed and confirmed the accuracy of the HPI and ROS as documented by the MA/LPN/RN Angie Moreno MA    Compared to one year ago, the patient feels her physical health is better.  Compared to one year ago, the patient feels her mental health is the same.    Reviewed chart for potential of high risk medication in the elderly: yes  Reviewed chart for potential of harmful drug interactions in the elderly:yes    Objective      There were no vitals filed for this visit.    There is no height or weight on file to calculate BMI.  Discussed the patient's BMI with her. The BMI is in the acceptable range.      Assessment    Medicare Risks and Personalized Health Plan  CMS Preventative Services Quick Reference  Cardiovascular risk  Obesity/Overweight     The above risks/problems have been discussed with the patient.  Pertinent information has been shared with the patient in the After Visit Summary.  Follow up plans and orders are seen below in the Assessment/Plan Section.    Problem List Items Addressed This Visit          Medium    Mixed hyperlipidemia    Overview     Controlled compliant with meds.         Paroxysmal atrial fibrillation    Overview     Onset 01/08/2021  No recurrence she is off anti-arrthymic, and anti coagulation   ASA 81 mg  She is followed with [facility]   Sinus Rhythm by exam,    Occurrence likely post covid inflammation which is documented w/ hx of pleural effusion            Low    Asymptomatic menopausal state    Overview     dexa scan 02/12/2014. WNL Repeat 2019         Class 2 severe obesity due to excess calories with serious comorbidity and body mass index (BMI) of 35.0 to 35.9 in adult    Overview     Healthy diet and regular exercise encouraged.           Screening for malignant neoplasm of cervix    Overview     S/P KRZYSZTOF BSO for fibroids.  Pap smear 01/04/2013. WNL         Encounter for screening mammogram for malignant neoplasm of breast    Overview     Last mammogram  10/30/2023 Normal Left breast mammogram and recommended normal screening in 1 year.          Colon cancer screening    Overview     Cologuard 10/17/2019- negative. Repeat 2022            Unprioritized    Medicare annual wellness visit, subsequent - Primary    Overview     Diet exercise breast self exam, seat belts sunscreens, left breast self exam, fall prevention, and general safety. Present lab reviewed in detail.           Follow Up:  No follow-ups on file.    Site care done- cleaned with alcohol swab, procedure tolerated well, dressing applied. Venipuncture was obtained after 1 time(s). 2 tubes were drawn. Needle gauge used was 23-butterfly.    An After Visit Summary and PPPS were given to the patient.

## 2023-12-20 ENCOUNTER — OFFICE VISIT (OUTPATIENT)
Dept: FAMILY MEDICINE CLINIC | Facility: CLINIC | Age: 68
End: 2023-12-20
Payer: MEDICARE

## 2023-12-20 VITALS
BODY MASS INDEX: 35.79 KG/M2 | HEIGHT: 67 IN | HEART RATE: 96 BPM | DIASTOLIC BLOOD PRESSURE: 78 MMHG | TEMPERATURE: 97.3 F | RESPIRATION RATE: 20 BRPM | WEIGHT: 228 LBS | SYSTOLIC BLOOD PRESSURE: 138 MMHG | OXYGEN SATURATION: 100 %

## 2023-12-20 DIAGNOSIS — R35.0 URINARY FREQUENCY: ICD-10-CM

## 2023-12-20 DIAGNOSIS — I48.0 PAROXYSMAL ATRIAL FIBRILLATION: ICD-10-CM

## 2023-12-20 DIAGNOSIS — J30.1 CHRONIC ALLERGIC RHINITIS DUE TO POLLEN: ICD-10-CM

## 2023-12-20 DIAGNOSIS — Z12.4 SCREENING FOR MALIGNANT NEOPLASM OF CERVIX: ICD-10-CM

## 2023-12-20 DIAGNOSIS — J68.3 REACTIVE AIRWAYS DYSFUNCTION SYNDROME: ICD-10-CM

## 2023-12-20 DIAGNOSIS — Z00.00 MEDICARE ANNUAL WELLNESS VISIT, SUBSEQUENT: Primary | ICD-10-CM

## 2023-12-20 DIAGNOSIS — M17.0 PRIMARY OSTEOARTHRITIS OF BOTH KNEES: ICD-10-CM

## 2023-12-20 DIAGNOSIS — E78.2 MIXED HYPERLIPIDEMIA: ICD-10-CM

## 2023-12-20 DIAGNOSIS — Z12.31 ENCOUNTER FOR SCREENING MAMMOGRAM FOR MALIGNANT NEOPLASM OF BREAST: ICD-10-CM

## 2023-12-20 DIAGNOSIS — L82.1 OTHER SEBORRHEIC KERATOSIS: ICD-10-CM

## 2023-12-20 DIAGNOSIS — Z85.3 HISTORY OF MALIGNANT NEOPLASM OF FEMALE BREAST: ICD-10-CM

## 2023-12-20 DIAGNOSIS — Z78.0 ASYMPTOMATIC MENOPAUSAL STATE: ICD-10-CM

## 2023-12-20 DIAGNOSIS — E66.01 CLASS 2 SEVERE OBESITY DUE TO EXCESS CALORIES WITH SERIOUS COMORBIDITY AND BODY MASS INDEX (BMI) OF 35.0 TO 35.9 IN ADULT: ICD-10-CM

## 2023-12-20 DIAGNOSIS — Z12.11 COLON CANCER SCREENING: ICD-10-CM

## 2023-12-20 NOTE — ASSESSMENT & PLAN NOTE
Patient's (Body mass index is 35.71 kg/m².) indicates that they are obese (BMI >30) with health conditions that include hypertension and dyslipidemias . Weight is improving with treatment. BMI  is above average; BMI management plan is completed. We discussed portion control and increasing exercise.

## 2023-12-21 LAB
APPEARANCE UR: CLEAR
BACTERIA #/AREA URNS HPF: NORMAL /[HPF]
BILIRUB UR QL STRIP: NEGATIVE
CASTS URNS QL MICRO: NORMAL /LPF
COLOR UR: YELLOW
EPI CELLS #/AREA URNS HPF: NORMAL /HPF (ref 0–10)
GLUCOSE UR QL STRIP: NEGATIVE
HGB UR QL STRIP: NEGATIVE
KETONES UR QL STRIP: NEGATIVE
LEUKOCYTE ESTERASE UR QL STRIP: NEGATIVE
MICRO URNS: NORMAL
MICRO URNS: NORMAL
NITRITE UR QL STRIP: NEGATIVE
PH UR STRIP: 6.5 [PH] (ref 5–7.5)
PROT UR QL STRIP: NEGATIVE
RBC #/AREA URNS HPF: NORMAL /HPF (ref 0–2)
SP GR UR STRIP: 1.01 (ref 1–1.03)
UROBILINOGEN UR STRIP-MCNC: 0.2 MG/DL (ref 0.2–1)
WBC #/AREA URNS HPF: NORMAL /HPF (ref 0–5)

## 2023-12-28 NOTE — PROGRESS NOTES
[email or web address] message sent  Good morning we have your pathology report back and per Dr. Aclazar  Your pathology is benign, which means not cancerous.

## 2024-01-21 DIAGNOSIS — J30.1 CHRONIC ALLERGIC RHINITIS DUE TO POLLEN: ICD-10-CM

## 2024-01-21 DIAGNOSIS — E78.2 MIXED HYPERLIPIDEMIA: ICD-10-CM

## 2024-01-22 RX ORDER — MONTELUKAST SODIUM 10 MG/1
10 TABLET ORAL NIGHTLY
Qty: 90 TABLET | Refills: 3 | Status: SHIPPED | OUTPATIENT
Start: 2024-01-22

## 2024-01-22 RX ORDER — ROSUVASTATIN CALCIUM 5 MG/1
5 TABLET, COATED ORAL DAILY
Qty: 90 TABLET | Refills: 3 | Status: SHIPPED | OUTPATIENT
Start: 2024-01-22

## 2024-06-04 ENCOUNTER — TELEPHONE (OUTPATIENT)
Dept: FAMILY MEDICINE CLINIC | Facility: CLINIC | Age: 69
End: 2024-06-04
Payer: MEDICARE

## 2024-06-04 DIAGNOSIS — J06.9 VIRAL UPPER RESPIRATORY TRACT INFECTION: Primary | ICD-10-CM

## 2024-06-04 RX ORDER — AZITHROMYCIN 250 MG/1
TABLET, FILM COATED ORAL
Qty: 6 TABLET | Refills: 0 | Status: SHIPPED | OUTPATIENT
Start: 2024-06-04

## 2024-06-04 NOTE — TELEPHONE ENCOUNTER
Caller: Montserrat Cueva    Relationship: Self    Best call back number: [phone]     What medication are you requesting: ANTIBIOTIC    What are your current symptoms: YELLOW PHLEGM     How long have you been experiencing symptoms: 2 WEEKS    If a prescription is needed, what is your preferred pharmacy and phone number: [facility]  [place], [place] - [address] - [phone]  - [phone]     Additional notes:

## 2024-08-16 ENCOUNTER — TELEPHONE (OUTPATIENT)
Dept: FAMILY MEDICINE CLINIC | Facility: CLINIC | Age: 69
End: 2024-08-16
Payer: MEDICARE

## 2024-08-16 NOTE — TELEPHONE ENCOUNTER
Patient walked in today saying she heard Dr. Alcazar was retiring and she hadn't been notified about this. I offered for her to make an appt to discuss with Dr. Alcazar but she said she has an appt in December. She would like a call to discuss what doctor she will be seeing or if she needs to find another one.

## 2024-09-24 NOTE — PROGRESS NOTES
"Chief Complaint  Skin Lesion    Subjective     CC  Problem List  Visit Diagnosis   Encounters  Notes  Medications  Labs  Result Review Imaging  Media    Montserrat Cueva presents to [facility] for   History of Present Illness  Skin Lesion:   Patient complains of a skin lesion of the face. The lesion has been present for 3 months. Lesion has not changed in 3 months. Symptoms associated with the lesion are: itching, bleeding, unsightliness, drainage, none. Patient denies increasing diameter, increasing thickness, increasing number of lesions, none  The lesion is located over the right mid chin.       Review of Systems   Constitutional:  Negative for fever and unexpected weight loss.   Respiratory:  Negative for shortness of breath.    Cardiovascular:  Negative for chest pain and palpitations.   Skin:  Positive for skin lesions (right mid chin).        Objective   Vital Signs:   BP (!) 158/102 (BP Location: Right arm, Patient Position: Sitting, Cuff Size: Adult)   Pulse 76   Temp 97.7 °F (36.5 °C) (Temporal)   Resp 18   Ht 170.2 cm (67.01\")   Wt 102 kg (224 lb)   SpO2 97%   BMI 35.07 kg/m²     Physical Exam  Skin:     Findings: Lesion (There is a 0.6 mm irregular flesh colored papule over the right mid chin. It may have a central crater.) present.   Neurological:      Mental Status: She is alert.        Result Review :Labs  Result Review  Imaging  Med Tab  Media          Skin Excision    Date/Time: 9/27/2024 10:09 PM    Performed by: Nyla Alcazar MD  Authorized by: Nyla Alcazar MD    Consent:     Consent obtained:  Written    Consent given by:  Patient    Risks discussed:  Bleeding, infection and poor cosmetic result    Alternatives discussed:  Referral  Pre-procedure details:     Preparation: Patient was prepped and draped in usual sterile fashion    Anesthesia:     Anesthesia method:  Local infiltration    Local anesthetic:  Lidocaine 2% WITH " epi  Procedure details:     Body Area:  Face (right mid chin)    Malignancy: benign lesion      Initial Size:  6 (mm)    Final defect size (mm):  6 (mm)  Post-procedure details:     Patient tolerance of procedure:  Tolerated well, no immediate complications  Comments:      The 6 mm flesh colored papular lesion over the right mid chin was cleansed with betadine, anesthestized with 2% lidocaine with epinephrine.Under sterile conditions the lesion was excised completely by shaving. The specimen was sent for pathology. Bleeding was minimal and controlled with cautery. She tolerated the procedure well. She received wound care precautions and she will notify us prn sxs of infection We will notify her of pathology results.          Assessment and Plan CC Problem List  Visit Diagnosis  ROS  Review (Popup)  Health Maintenance  Quality  BestPractice  Medications  SmartSets  SnapShot Encounters  Media  Problem List Items Addressed This Visit    None  Visit Diagnoses       Follicular cyst of skin    -  Primary    wound care precautions and instructions s/p shaving. notify of pathology.    Relevant Orders    Reference Histopathology (Completed)    Skin Excision            Follow Up Instructions Charge Capture  Follow-up Communications  Return sxs of infection or problems..  Patient was given instructions and counseling regarding her condition or for health maintenance advice. Please see specific information pulled into the AVS if appropriate.

## 2024-09-27 ENCOUNTER — OFFICE VISIT (OUTPATIENT)
Dept: FAMILY MEDICINE CLINIC | Facility: CLINIC | Age: 69
End: 2024-09-27
Payer: MEDICARE

## 2024-09-27 VITALS
DIASTOLIC BLOOD PRESSURE: 102 MMHG | TEMPERATURE: 97.7 F | OXYGEN SATURATION: 97 % | BODY MASS INDEX: 35.16 KG/M2 | HEART RATE: 76 BPM | WEIGHT: 224 LBS | SYSTOLIC BLOOD PRESSURE: 158 MMHG | HEIGHT: 67 IN | RESPIRATION RATE: 18 BRPM

## 2024-09-27 DIAGNOSIS — L72.9 FOLLICULAR CYST OF SKIN: Primary | ICD-10-CM

## 2024-10-01 NOTE — PROGRESS NOTES
Called patient about lab   Good afternoon we have your pathology back and per Dr. Alcazar   The area that was removed has come back as a benign cyst. This is good as I felt it was more and it is not. If it was to come back then we should excise it more but we should not have any more issues from it.

## 2024-10-02 ENCOUNTER — TELEPHONE (OUTPATIENT)
Dept: FAMILY MEDICINE CLINIC | Facility: CLINIC | Age: 69
End: 2024-10-02
Payer: MEDICARE

## 2024-10-02 NOTE — TELEPHONE ENCOUNTER
Caller: Montserrat Cueva    Relationship: Self    Best call back number: [phone]    Caller requesting test results:     What test was performed: LABS    When was the test performed: 9/27/24    Where was the test performed: IN OFFICE

## 2024-10-17 ENCOUNTER — FLU SHOT (OUTPATIENT)
Dept: FAMILY MEDICINE CLINIC | Facility: CLINIC | Age: 69
End: 2024-10-17
Payer: MEDICARE

## 2024-10-17 DIAGNOSIS — Z23 NEED FOR INFLUENZA VACCINATION: Primary | ICD-10-CM

## 2024-10-17 PROCEDURE — 90662 IIV NO PRSV INCREASED AG IM: CPT | Performed by: FAMILY MEDICINE

## 2024-10-17 PROCEDURE — G0008 ADMIN INFLUENZA VIRUS VAC: HCPCS | Performed by: FAMILY MEDICINE

## 2024-11-18 ENCOUNTER — HOSPITAL ENCOUNTER (OUTPATIENT)
Dept: MAMMOGRAPHY | Facility: HOSPITAL | Age: 69
Discharge: HOME | End: 2024-11-18
Admitting: FAMILY MEDICINE
Payer: MEDICARE

## 2024-11-18 DIAGNOSIS — Z12.31 ENCOUNTER FOR SCREENING MAMMOGRAM FOR MALIGNANT NEOPLASM OF BREAST: ICD-10-CM

## 2024-11-18 PROCEDURE — 77067 SCR MAMMO BI INCL CAD: CPT

## 2024-11-18 PROCEDURE — 77063 BREAST TOMOSYNTHESIS BI: CPT

## 2024-11-18 NOTE — PROGRESS NOTES
[employer] message was sent   Good morning we have your mammogram back and per Dr. Alcazar   There are scattered areas of fibroglandular density, No suspicious masses  No mammographic evidence of malignancy in the left breast.  Recommend  annual screening mammography in 1 year.

## 2024-12-03 ENCOUNTER — CLINICAL SUPPORT (OUTPATIENT)
Dept: FAMILY MEDICINE CLINIC | Facility: CLINIC | Age: 69
End: 2024-12-03
Payer: MEDICARE

## 2024-12-03 VITALS — WEIGHT: 230.6 LBS | BODY MASS INDEX: 36.11 KG/M2

## 2024-12-05 ENCOUNTER — TELEPHONE (OUTPATIENT)
Dept: FAMILY MEDICINE CLINIC | Facility: CLINIC | Age: 69
End: 2024-12-05
Payer: MEDICARE

## 2024-12-05 NOTE — TELEPHONE ENCOUNTER
Called patient back and she said that she has been very stressed as of lately due to her father passing and she said that she felt she was drinking way to much sweet drinks and she stopped them cold turkey and she states that she started feeling off and she started to notice her pressure higher at this time. She said she has not had any chest pain and or no numbness or tingling. She reports she felt hot and flush. A couple of the pressure were normal and there was a couple that was not. She said that she felt she could monitor her pressure more and come in next week for appointment and she will bring the log with her and Dr. Alcazar can take a look at it and see if there is a medication that was needed.

## 2024-12-18 NOTE — PROGRESS NOTES
Subjective   The ABCs of the Annual Wellness Visit  Medicare Wellness Visit      Montserrat Cueva is a 69 y.o. patient who presents for a Medicare Wellness Visit.    The following portions of the patient's history were reviewed and   updated as appropriate: allergies, current medications, past family history, past medical history, past social history, past surgical history, and problem list.    Compared to one year ago, the patient's physical   health is the same.  Compared to one year ago, the patient's mental   health is the same.    Recent Hospitalizations:  She was not admitted to the hospital during the last year.     Current Medical Providers:  Patient Care Team:  Nyla Alcazar MD as PCP - General  [facility]  Amilcar Raya MD as Consulting Physician (Cardiology)  Luis Carbajal MD as Consulting Physician (Allergy)    Outpatient Medications Prior to Visit   Medication Sig Dispense Refill    albuterol sulfate  (90 Base) MCG/ACT inhaler       albuterol sulfate  (90 Base) MCG/ACT inhaler Inhale 2 puffs Every 4 (Four) Hours As Needed for Wheezing or Shortness of Air. 6.7 g 0    Ascorbic Acid (Vitamin C) 500 MG capsule Take  by mouth.      aspirin 81 MG EC tablet Take 1 tablet by mouth Daily.      Breyna 160-4.5 MCG/ACT inhaler Inhale 2 puffs 2 (Two) Times a Day.      cholecalciferol (VITAMIN D3) 25 MCG (1000 UT) tablet Take 1 tablet by mouth Daily.      EPINEPHrine (EPIPEN) 0.3 MG/0.3ML solution auto-injector injection Inject 0.3 mL into the appropriate muscle as directed by prescriber As Needed for Allergies.  3    fluticasone (FLONASE) 50 MCG/ACT nasal spray 2 sprays into the nostril(s) as directed by provider Daily. 16 g 12    montelukast (SINGULAIR) 10 MG tablet TAKE 1 TABLET BY MOUTH ONCE DAILY AT NIGHT 90 tablet 3    Multiple Vitamins-Minerals (ZINC PO) Take 1 tablet by mouth Daily.      rosuvastatin (CRESTOR) 5 MG tablet Take 1 tablet by mouth once  "daily 90 tablet 3    Zinc 50 MG tablet Take  by mouth.       No facility-administered medications prior to visit.     No opioid medication identified on active medication list. I have reviewed chart for other potential  high risk medication/s and harmful drug interactions in the elderly.      Aspirin is on active medication list. Aspirin use is indicated based on review of current medical condition/s. Pros and cons of this therapy have been discussed today. Benefits of this medication outweigh potential harm.  Patient has been encouraged to continue taking this medication.  .      Patient Active Problem List   Diagnosis    Chronic allergic rhinitis due to pollen    History of malignant neoplasm of female breast    Mixed hyperlipidemia    Asymptomatic menopausal state    Class 2 severe obesity due to excess calories with serious comorbidity and body mass index (BMI) of 35.0 to 35.9 in adult    Screening for malignant neoplasm of cervix    Medicare annual wellness visit, subsequent    Encounter for screening mammogram for malignant neoplasm of breast    Paroxysmal atrial fibrillation    Reactive airways dysfunction syndrome    Primary osteoarthritis of both knees     Advance Care Planning Advance Directive is not on file.  ACP discussion was held with the patient during this visit. Patient does not have an advance directive, information provided.            Objective   Vitals:    12/23/24 1042   BP: 138/72   BP Location: Right arm   Patient Position: Sitting   Cuff Size: Large Adult   Pulse: 76   Resp: 18   Temp: 98.7 °F (37.1 °C)   TempSrc: Temporal   SpO2: 96%  Comment: room air   Weight: 103 kg (227 lb 9.6 oz)   Height: 170.2 cm (67.01\")   PainSc: 0-No pain       Estimated body mass index is 35.64 kg/m² as calculated from the following:    Height as of this encounter: 170.2 cm (67.01\").    Weight as of this encounter: 103 kg (227 lb 9.6 oz).                Does the patient have evidence of cognitive impairment? " No  Lab Results   Component Value Date    CHLPL 160 2024    TRIG 79 2024    HDL 57 2024    LDL 88 2024    VLDL 15 2024    HGBA1C 5.6 2024                                                                                                Health  Risk Assessment    Smoking Status:  Social History     Tobacco Use   Smoking Status Never   Smokeless Tobacco Never     Alcohol Consumption:  Social History     Substance and Sexual Activity   Alcohol Use No       Fall Risk Screen  STEADI Fall Risk Assessment has not been completed.  5 min spent obtaining and discussing screen    Depression Screening   Little interest or pleasure in doing things? Several days   Feeling down, depressed, or hopeless? Several days   PHQ-2 Total Score 2   Trouble falling or staying asleep, or sleeping too much? Not at all   Feeling tired or having little energy? Not at all   Poor appetite or overeating? Not at all   Feeling bad about yourself - or that you are a failure or have let yourself or your family down? Not at all   Trouble concentrating on things, such as reading the newspaper or watching television? Not at all   Moving or speaking so slowly that other people could have noticed? Or the opposite - being so fidgety or restless that you have been moving around a lot more than usual? Not at all   Thoughts that you would be better off dead, or of hurting yourself in some way? Not at all   PHQ-9 Total Score 2   If you checked off any problems, how difficult have these problems made it for you to do your work, take care of things at home, or get along with other people?        Health Habits and Functional and Cognitive Screenin/23/2024    10:00 AM   Functional & Cognitive Status   Do you have difficulty preparing food and eating? No   Do you have difficulty bathing yourself, getting dressed or grooming yourself? No   Do you have difficulty using the toilet? No   Do you have difficulty moving around from  place to place? No   Do you have trouble with steps or getting out of a bed or a chair? No   Current Diet Limited Junk Food   Dental Exam Up to date   Eye Exam Not up to date   Exercise (times per week) 2 times per week   Current Exercises Include House Cleaning   Do you need help using the phone?  No   Are you deaf or do you have serious difficulty hearing?  No   Do you need help to go to places out of walking distance? No   Do you need help shopping? No   Do you need help preparing meals?  No   Do you need help with housework?  No   Do you need help with laundry? No   Do you need help taking your medications? No   Do you need help managing money? No   Do you ever drive or ride in a car without wearing a seat belt? No   Have you felt unusual stress, anger or loneliness in the last month? Yes   Who do you live with? Spouse   If you need help, do you have trouble finding someone available to you? No   Have you been bothered in the last four weeks by sexual problems? No   Do you have difficulty concentrating, remembering or making decisions? No           Age-appropriate Screening Schedule:  Refer to the list below for future screening recommendations based on patient's age, sex and/or medical conditions. Orders for these recommended tests are listed in the plan section. The patient has been provided with a written plan.    Health Maintenance List  Health Maintenance   Topic Date Due    ZOSTER VACCINE (2 of 3) 01/13/2016    TDAP/TD VACCINES (3 - Td or Tdap) 04/12/2022    COVID-19 Vaccine (2 - 2024-25 season) 09/01/2024    DXA SCAN  11/28/2024    ANNUAL WELLNESS VISIT  12/23/2025    LIPID PANEL  12/23/2025    BMI FOLLOWUP  12/23/2025    COLORECTAL CANCER SCREENING  01/03/2026    MAMMOGRAM  11/18/2026    HEPATITIS C SCREENING  Completed    INFLUENZA VACCINE  Completed    Pneumococcal Vaccine 65+  Completed                                                                                                                                                 CMS Preventative Services Quick Reference  Risk Factors Identified During Encounter  Cardiovascular risk.  Her risk factors are modified.     The above risks/problems have been discussed with the patient.  Pertinent information has been shared with the patient in the After Visit Summary.  An After Visit Summary and PPPS were made available to the patient.    Follow Up:   Next Medicare Wellness visit to be scheduled in 1 year.         Additional E&M Note during same encounter follows:  Patient has additional, significant, and separately identifiable condition(s)/problem(s) that require work above and beyond the Medicare Wellness Visit     Chief Complaint  Medicare Wellness-subsequent, Atrial Fibrillation, and Hyperlipidemia    Subjective   Atrial Fibrillation  Presents for follow-up visit. Symptoms are negative for chest pain, dizziness, palpitations, shortness of breath, syncope and weakness. The symptoms have been stable. Past medical history includes atrial fibrillation and hyperlipidemia. There are no medication compliance problems.   Hyperlipidemia  This is a chronic problem. The current episode started more than 1 year ago. The problem is controlled. Exacerbating diseases include obesity. She has no history of chronic renal disease, diabetes, hypothyroidism or liver disease. Factors aggravating her hyperlipidemia include fatty foods. Pertinent negatives include no chest pain, focal sensory loss, leg pain or shortness of breath. Current antihyperlipidemic treatment includes statins. The current treatment provides mild improvement of lipids. There are no compliance problems.  Risk factors for coronary artery disease include obesity, dyslipidemia and post-menopausal.     Montserrat is also being seen today for additional medical problem/s.    Review of Systems   Constitutional:  Negative for activity change, appetite change, fatigue and fever.   Eyes:  Negative for visual disturbance.  "  Respiratory:  Negative for shortness of breath and wheezing.    Cardiovascular:  Negative for chest pain, palpitations, leg swelling and syncope.   Gastrointestinal:  Negative for abdominal pain, constipation, diarrhea and nausea.   Endocrine: Negative for cold intolerance, heat intolerance, polydipsia and polyuria.   Genitourinary:  Negative for dysuria, frequency and urgency.   Musculoskeletal:  Negative for arthralgias and joint swelling.   Skin:  Negative for rash.   Neurological:  Negative for dizziness, syncope, weakness, numbness and confusion.   Hematological:  Negative for adenopathy. Does not bruise/bleed easily.   Psychiatric/Behavioral:  Negative for suicidal ideas. The patient is not nervous/anxious.               Objective   Vital Signs:  /72 (BP Location: Right arm, Patient Position: Sitting, Cuff Size: Large Adult)   Pulse 76   Temp 98.7 °F (37.1 °C) (Temporal)   Resp 18   Ht 170.2 cm (67.01\")   Wt 103 kg (227 lb 9.6 oz)   SpO2 96% Comment: room air  BMI 35.64 kg/m²   Physical Exam  Constitutional:       General: She is not in acute distress.     Appearance: She is well-developed.   HENT:      Head: Normocephalic. Hair is normal.      Right Ear: Tympanic membrane and external ear normal.      Left Ear: Tympanic membrane and external ear normal.      Nose: Nose normal. No mucosal edema.      Mouth/Throat:      Pharynx: Uvula midline.   Eyes:      General:         Right eye: No discharge.         Left eye: No discharge.      Conjunctiva/sclera: Conjunctivae normal.      Pupils: Pupils are equal, round, and reactive to light.   Neck:      Thyroid: No thyromegaly.      Vascular: No JVD.   Cardiovascular:      Rate and Rhythm: Normal rate and regular rhythm.      Chest Wall: PMI is not displaced.      Pulses:           Carotid pulses are 2+ on the right side and 2+ on the left side.       Femoral pulses are 2+ on the right side and 2+ on the left side.       Dorsalis pedis pulses are 2+ on " the right side and 2+ on the left side.      Heart sounds: Normal heart sounds. No murmur heard.     No friction rub. No gallop.      Comments: Negative Homans' no edema  Pulmonary:      Effort: Pulmonary effort is normal. No respiratory distress.      Breath sounds: Normal breath sounds. No decreased breath sounds, wheezing, rhonchi or rales.   Chest:   Breasts:     Right: Absent.      Left: No inverted nipple, mass, nipple discharge, skin change or tenderness.   Abdominal:      General: Bowel sounds are normal. There is no distension or abdominal bruit.      Palpations: Abdomen is soft. There is no mass.      Tenderness: There is no abdominal tenderness.   Musculoskeletal:         General: Deformity (knees and distal fingers, with decreased ROM.) present. No tenderness.      Cervical back: Normal range of motion and neck supple. No muscular tenderness.   Lymphadenopathy:      Cervical: No cervical adenopathy.      Upper Body:      Right upper body: No supraclavicular or axillary adenopathy.      Left upper body: No supraclavicular or axillary adenopathy.   Skin:     General: Skin is warm and dry.      Findings: No ecchymosis, erythema, lesion or rash.   Neurological:      Mental Status: She is alert and oriented to person, place, and time.      Cranial Nerves: No cranial nerve deficit.      Sensory: No sensory deficit.      Motor: No abnormal muscle tone.      Coordination: Coordination normal.      Deep Tendon Reflexes: Reflexes are normal and symmetric. Reflexes normal.   Psychiatric:         Speech: Speech normal.         Behavior: Behavior normal.         Thought Content: Thought content normal. Thought content does not include suicidal ideation.         Cognition and Memory: She does not exhibit impaired recent memory or impaired remote memory.         Judgment: Judgment normal. Judgment is not impulsive.       The following data was reviewed by: Nyla Alcazar MD on 12/23/2024:        Medicare annual  wellness visit, subsequent    Paroxysmal atrial fibrillation    Reactive airways dysfunction syndrome    Mixed hyperlipidemia   Lipid abnormalities are stable    Plan:  Continue same medication/s without change.      Discussed medication dosage, use, side effects, and goals of treatment in detail.    Counseled patient on lifestyle modifications to help control hyperlipidemia.     Patient Treatment Goals:   LDL goal is less than 70    Followup in 3 months.  Hyperglycemia    Asymptomatic menopausal state    Chronic allergic rhinitis due to pollen    Primary osteoarthritis of both knees    Class 2 severe obesity due to excess calories with serious comorbidity and body mass index (BMI) of 35.0 to 35.9 in adult  Patient's (Body mass index is 35.64 kg/m².) indicates that they are obese (BMI >30) with health conditions that include hypertension and dyslipidemias . Weight is unchanged. BMI  is above average; BMI management plan is completed. We discussed low calorie, low carb based diet program, portion control, and increasing exercise.   History of malignant neoplasm of female breast    Encounter for screening mammogram for malignant neoplasm of breast    Frequent urination    Screening for malignant neoplasm of cervix    Colon cancer screening      Orders Placed This Encounter   Procedures    Comprehensive Metabolic Panel     Order Specific Question:   Release to patient     Answer:   Routine Release [[id number]]    Lipid Panel With / Chol / HDL Ratio     Order Specific Question:   Release to patient     Answer:   Routine Release [[id number]]    TSH     Order Specific Question:   Release to patient     Answer:   Routine Release [[id number]]    Hemoglobin A1c     Order Specific Question:   Release to patient     Answer:   Routine Release [[id number]]    Hemoglobin A1c    Microscopic Examination -    POCT urinalysis dipstick, manual     Order Specific Question:   Release to patient     Answer:   Routine Release [[id number]]     CBC & Differential     Order Specific Question:   Manual Differential     Answer:   No     Order Specific Question:   Release to patient     Answer:   Routine Release [[id number]]    Urinalysis With Microscopic - Urine, Random Void     Order Specific Question:   Release to patient     Answer:   Routine Release [[id number]]        Assessment and Plan CC Problem List  Visit Diagnosis  ROS  Review (Popup)  Health Maintenance  Quality  BestPractice  Medications  SmartSets  SnapShot Encounters  Media  Problem List Items Addressed This Visit          High    Paroxysmal atrial fibrillation    Overview     Onset 01/08/2021  No recurrence she is off anti-arrthymic, and anti coagulation   ASA 81 mg  She is followed with [facility]   Sinus Rhythm by exam,    Occurrence likely post covid inflammation which is documented w/ hx of pleural effusion.  No hx of recurrence to date            Medium    Mixed hyperlipidemia    Overview     Controlled compliant with meds.         Current Assessment & Plan      Lipid abnormalities are stable    Plan:  Continue same medication/s without change.      Discussed medication dosage, use, side effects, and goals of treatment in detail.    Counseled patient on lifestyle modifications to help control hyperlipidemia.     Patient Treatment Goals:   LDL goal is less than 70    Followup in 3 months.         Relevant Orders    Lipid Panel With / Chol / HDL Ratio (Completed)    TSH (Completed)    Reactive airways dysfunction syndrome    Overview     2 yr post covid hx of bilateral plerual effusions draining of right  pleural effusion. Neg CXR 12/17/2023  Contine inhaled steroids prn rescue inhalers prn  No sxs presently            Low    Chronic allergic rhinitis due to pollen    Overview     She is on immunRx   She is compliant with meds which are continue.  Avoidance, mask for yard mowing and follow up if no improvement          History of malignant neoplasm of female breast    Overview      stage II, s/p right modified mastectomy 1993, negative exam         Class 2 severe obesity due to excess calories with serious comorbidity and body mass index (BMI) of 35.0 to 35.9 in adult    Overview     Healthy diet and regular exercise encouraged.           Current Assessment & Plan     Patient's (Body mass index is 35.64 kg/m².) indicates that they are  with health conditions that include hypertension and dyslipidemias . Weight is unchanged. BMI  . We discussed low calorie, low carb based diet program, portion control, and increasing exercise.          Primary osteoarthritis of both knees    Overview     Mild to moderate on xay 5/2023  Wt loss encouraged  Sxs are improved.             Unprioritized    Asymptomatic menopausal state    Overview     dexa scan 02/12/2014. WNL Repeat 2019         Screening for malignant neoplasm of cervix    Overview     S/P [id number] BSO for fibroids.  Pap smear 01/04/2013. WNL         Medicare annual wellness visit, subsequent - Primary    Overview     Diet exercise breast self exam, seat belts sunscreens, left breast self exam, fall prevention, and general safety. Present lab reviewed in detail.         Relevant Orders    CBC & Differential (Completed)    Comprehensive Metabolic Panel (Completed)    POCT urinalysis dipstick, manual (Completed)    Encounter for screening mammogram for malignant neoplasm of breast    Overview     Last mammogram 10/30/2023 Normal Left breast mammogram and recommended normal screening in 1 year.          RESOLVED: Colon cancer screening    Overview     Cologuard 12/25/20222           Other Visit Diagnoses       Hyperglycemia        A1c 5.6 12/23/2024    Relevant Orders    Hemoglobin A1c    Frequent urination        Relevant Orders    Urinalysis With Microscopic - Urine, Random Void (Completed)            Follow Up   Return in about 3 months (around 3/23/2025).  Patient was given instructions and counseling regarding her condition or for health maintenance  advice. Please see specific information pulled into the AVS if appropriate.    Advanced Care Planning:  ACP discussion was held with the patient during this visit. Patient does not have an advance directive, information provided.    A face-to-face visit was completed today with patient.  Counseling explanation, and discussion of advanced directives was performed.   The last advanced care visit was performed in 2023.  In a near life ending situation, from which she is not expected to recover functionally, and she is not able to speak for herself, she does not want life sustaining measures. We discussed feeding tubes, ventilators and cardiac support as well as dialysis.    I spent more than 16 minutes discussing Advanced Care Planning information and documenting in the chart.

## 2024-12-23 ENCOUNTER — OFFICE VISIT (OUTPATIENT)
Dept: FAMILY MEDICINE CLINIC | Facility: CLINIC | Age: 69
End: 2024-12-23
Payer: MEDICARE

## 2024-12-23 VITALS
RESPIRATION RATE: 18 BRPM | BODY MASS INDEX: 35.72 KG/M2 | OXYGEN SATURATION: 96 % | WEIGHT: 227.6 LBS | HEART RATE: 76 BPM | HEIGHT: 67 IN | TEMPERATURE: 98.7 F | DIASTOLIC BLOOD PRESSURE: 72 MMHG | SYSTOLIC BLOOD PRESSURE: 138 MMHG

## 2024-12-23 DIAGNOSIS — J68.3 REACTIVE AIRWAYS DYSFUNCTION SYNDROME: ICD-10-CM

## 2024-12-23 DIAGNOSIS — E66.812 CLASS 2 SEVERE OBESITY DUE TO EXCESS CALORIES WITH SERIOUS COMORBIDITY AND BODY MASS INDEX (BMI) OF 35.0 TO 35.9 IN ADULT: ICD-10-CM

## 2024-12-23 DIAGNOSIS — J30.1 CHRONIC ALLERGIC RHINITIS DUE TO POLLEN: ICD-10-CM

## 2024-12-23 DIAGNOSIS — R73.9 HYPERGLYCEMIA: ICD-10-CM

## 2024-12-23 DIAGNOSIS — M17.0 PRIMARY OSTEOARTHRITIS OF BOTH KNEES: ICD-10-CM

## 2024-12-23 DIAGNOSIS — E66.01 CLASS 2 SEVERE OBESITY DUE TO EXCESS CALORIES WITH SERIOUS COMORBIDITY AND BODY MASS INDEX (BMI) OF 35.0 TO 35.9 IN ADULT: ICD-10-CM

## 2024-12-23 DIAGNOSIS — I48.0 PAROXYSMAL ATRIAL FIBRILLATION: ICD-10-CM

## 2024-12-23 DIAGNOSIS — E78.2 MIXED HYPERLIPIDEMIA: ICD-10-CM

## 2024-12-23 DIAGNOSIS — Z78.0 ASYMPTOMATIC MENOPAUSAL STATE: ICD-10-CM

## 2024-12-23 DIAGNOSIS — Z12.31 ENCOUNTER FOR SCREENING MAMMOGRAM FOR MALIGNANT NEOPLASM OF BREAST: ICD-10-CM

## 2024-12-23 DIAGNOSIS — Z85.3 HISTORY OF MALIGNANT NEOPLASM OF FEMALE BREAST: ICD-10-CM

## 2024-12-23 DIAGNOSIS — Z00.00 MEDICARE ANNUAL WELLNESS VISIT, SUBSEQUENT: Primary | ICD-10-CM

## 2024-12-23 DIAGNOSIS — Z12.4 SCREENING FOR MALIGNANT NEOPLASM OF CERVIX: ICD-10-CM

## 2024-12-23 DIAGNOSIS — R35.0 FREQUENT URINATION: ICD-10-CM

## 2024-12-23 DIAGNOSIS — Z12.11 COLON CANCER SCREENING: ICD-10-CM

## 2024-12-23 PROBLEM — R03.0 ELEVATED BLOOD PRESSURE READING: Status: ACTIVE | Noted: 2024-12-23

## 2024-12-23 LAB
BILIRUB BLD-MCNC: NEGATIVE MG/DL
CLARITY, POC: CLEAR
COLOR UR: YELLOW
GLUCOSE UR STRIP-MCNC: NEGATIVE MG/DL
KETONES UR QL: NEGATIVE
LEUKOCYTE EST, POC: NEGATIVE
NITRITE UR-MCNC: NEGATIVE MG/ML
PH UR: 5 [PH] (ref 5–8)
PROT UR STRIP-MCNC: NEGATIVE MG/DL
RBC # UR STRIP: ABNORMAL /UL
SP GR UR: 1.01 (ref 1–1.03)
UROBILINOGEN UR QL: ABNORMAL

## 2024-12-23 PROCEDURE — G0439 PPPS, SUBSEQ VISIT: HCPCS | Performed by: FAMILY MEDICINE

## 2024-12-23 PROCEDURE — 81002 URINALYSIS NONAUTO W/O SCOPE: CPT | Performed by: FAMILY MEDICINE

## 2024-12-23 PROCEDURE — 1160F RVW MEDS BY RX/DR IN RCRD: CPT | Performed by: FAMILY MEDICINE

## 2024-12-23 PROCEDURE — 1159F MED LIST DOCD IN RCRD: CPT | Performed by: FAMILY MEDICINE

## 2024-12-23 PROCEDURE — 1126F AMNT PAIN NOTED NONE PRSNT: CPT | Performed by: FAMILY MEDICINE

## 2024-12-23 PROCEDURE — 99497 ADVNCD CARE PLAN 30 MIN: CPT | Performed by: FAMILY MEDICINE

## 2024-12-23 PROCEDURE — G0444 DEPRESSION SCREEN ANNUAL: HCPCS | Performed by: FAMILY MEDICINE

## 2024-12-23 PROCEDURE — 99214 OFFICE O/P EST MOD 30 MIN: CPT | Performed by: FAMILY MEDICINE

## 2024-12-23 RX ORDER — BUDESONIDE AND FORMOTEROL FUMARATE 160; 4.5 UG/1; UG/1
2 AEROSOL, METERED RESPIRATORY (INHALATION)
COMMUNITY
Start: 2024-11-17

## 2024-12-24 ENCOUNTER — TELEPHONE (OUTPATIENT)
Dept: FAMILY MEDICINE CLINIC | Facility: CLINIC | Age: 69
End: 2024-12-24
Payer: MEDICARE

## 2024-12-24 LAB
ALBUMIN SERPL-MCNC: 4.2 G/DL (ref 3.9–4.9)
ALP SERPL-CCNC: 89 IU/L (ref 44–121)
ALT SERPL-CCNC: 16 IU/L (ref 0–32)
APPEARANCE UR: CLEAR
AST SERPL-CCNC: 17 IU/L (ref 0–40)
BACTERIA #/AREA URNS HPF: NORMAL /[HPF]
BASOPHILS # BLD AUTO: 0 X10E3/UL (ref 0–0.2)
BASOPHILS NFR BLD AUTO: 0 %
BILIRUB SERPL-MCNC: 0.3 MG/DL (ref 0–1.2)
BILIRUB UR QL STRIP: NEGATIVE
BUN SERPL-MCNC: 15 MG/DL (ref 8–27)
BUN/CREAT SERPL: 27 (ref 12–28)
CALCIUM SERPL-MCNC: 9 MG/DL (ref 8.7–10.3)
CASTS URNS QL MICRO: NORMAL /LPF
CHLORIDE SERPL-SCNC: 105 MMOL/L (ref 96–106)
CHOLEST SERPL-MCNC: 160 MG/DL (ref 100–199)
CHOLEST/HDLC SERPL: 2.8 RATIO (ref 0–4.4)
CO2 SERPL-SCNC: 20 MMOL/L (ref 20–29)
COLOR UR: YELLOW
CREAT SERPL-MCNC: 0.55 MG/DL (ref 0.57–1)
EGFRCR SERPLBLD CKD-EPI 2021: 99 ML/MIN/1.73
EOSINOPHIL # BLD AUTO: 0.3 X10E3/UL (ref 0–0.4)
EOSINOPHIL NFR BLD AUTO: 4 %
EPI CELLS #/AREA URNS HPF: NORMAL /HPF (ref 0–10)
ERYTHROCYTE [DISTWIDTH] IN BLOOD BY AUTOMATED COUNT: 13 % (ref 11.7–15.4)
GLOBULIN SER CALC-MCNC: 2.8 G/DL (ref 1.5–4.5)
GLUCOSE SERPL-MCNC: ABNORMAL MG/DL
GLUCOSE UR QL STRIP: NEGATIVE
HBA1C MFR BLD: 5.6 % (ref 4.8–5.6)
HCT VFR BLD AUTO: 42.6 % (ref 34–46.6)
HDLC SERPL-MCNC: 57 MG/DL
HGB BLD-MCNC: 13.6 G/DL (ref 11.1–15.9)
HGB UR QL STRIP: NEGATIVE
IMM GRANULOCYTES # BLD AUTO: 0 X10E3/UL (ref 0–0.1)
IMM GRANULOCYTES NFR BLD AUTO: 0 %
KETONES UR QL STRIP: NEGATIVE
LDLC SERPL CALC-MCNC: 88 MG/DL (ref 0–99)
LEUKOCYTE ESTERASE UR QL STRIP: ABNORMAL
LYMPHOCYTES # BLD AUTO: 2.2 X10E3/UL (ref 0.7–3.1)
LYMPHOCYTES NFR BLD AUTO: 32 %
MCH RBC QN AUTO: 28.6 PG (ref 26.6–33)
MCHC RBC AUTO-ENTMCNC: 31.9 G/DL (ref 31.5–35.7)
MCV RBC AUTO: 90 FL (ref 79–97)
MICRO URNS: ABNORMAL
MONOCYTES # BLD AUTO: 0.6 X10E3/UL (ref 0.1–0.9)
MONOCYTES NFR BLD AUTO: 9 %
NEUTROPHILS # BLD AUTO: 3.7 X10E3/UL (ref 1.4–7)
NEUTROPHILS NFR BLD AUTO: 55 %
NITRITE UR QL STRIP: NEGATIVE
PH UR STRIP: 6 [PH] (ref 5–7.5)
PLATELET # BLD AUTO: 286 X10E3/UL (ref 150–450)
POTASSIUM SERPL-SCNC: ABNORMAL MMOL/L
PROT SERPL-MCNC: 7 G/DL (ref 6–8.5)
PROT UR QL STRIP: NEGATIVE
RBC # BLD AUTO: 4.75 X10E6/UL (ref 3.77–5.28)
RBC #/AREA URNS HPF: NORMAL /HPF (ref 0–2)
SODIUM SERPL-SCNC: 140 MMOL/L (ref 134–144)
SP GR UR STRIP: 1.02 (ref 1–1.03)
TRIGL SERPL-MCNC: 79 MG/DL (ref 0–149)
TSH SERPL DL<=0.005 MIU/L-ACNC: 0.91 UIU/ML (ref 0.45–4.5)
UROBILINOGEN UR STRIP-MCNC: 0.2 MG/DL (ref 0.2–1)
VLDLC SERPL CALC-MCNC: 15 MG/DL (ref 5–40)
WBC # BLD AUTO: 6.9 X10E3/UL (ref 3.4–10.8)
WBC #/AREA URNS HPF: NORMAL /HPF (ref 0–5)

## 2024-12-24 NOTE — PROGRESS NOTES
[employer] message sent  Good morning we have your labs back and per Dr. Alcazar  You have normal white blood cell count, normal platelet count ( this is the blood clotting factor) and no signs of anemia.   You have normal liver, kidney and thyroid function, your electrolyte function is normal as well. Your A1c (average blood sugar over 3 months) was 5.6, this means you are not in the prediabetic range.    Your total cholesterol was 160 this has increased from the last time it was checked as it was 156, your triglycerides was at 79 this has increased from the last time it was checked as it was 71, your HDL (healthy cholesterol) was 57 this has increased from the last time it was checked as it was 56( we want this to be as high as we can get it and we do this by exercise, exercise), your LDL ( lousy cholesterol) was 88 this has increased from the last time it was checked as it was 86. Your total cholesterol/cardiac ratio was 2.8 this has remained the same from the last time as it was 2.8.  Continue to work on diet along with exercise and taking your medications as prescribed, and we will check labs again in a year.     Your microscopic urine(better look for blood in the urine) is normal no blood seen within your urine.

## 2024-12-24 NOTE — TELEPHONE ENCOUNTER
Caller: Montserrat Cueva    Relationship: Self    Best call back number:   Telephone Information:   Mobile [phone]             What test was performed: LABS    When was the test performed: 12/22/24    Where was the test performed: IN HOUSE    Additional notes: PATIENT REQUESTING CALL BACK TO DISCUSS LAB RESULTS

## 2025-01-06 PROBLEM — R03.0 ELEVATED BLOOD PRESSURE READING: Status: RESOLVED | Noted: 2024-12-23 | Resolved: 2025-01-06

## 2025-01-06 PROBLEM — Z12.11 COLON CANCER SCREENING: Status: RESOLVED | Noted: 2019-10-14 | Resolved: 2025-01-06

## 2025-01-06 PROBLEM — Z86.16 HISTORY OF COVID-19: Status: RESOLVED | Noted: 2021-03-10 | Resolved: 2025-01-06

## 2025-01-07 NOTE — ASSESSMENT & PLAN NOTE
Patient's (Body mass index is 35.64 kg/m².) indicates that they are obese (BMI >30) with health conditions that include hypertension and dyslipidemias . Weight is unchanged. BMI  is above average; BMI management plan is completed. We discussed low calorie, low carb based diet program, portion control, and increasing exercise.

## 2025-02-01 DIAGNOSIS — E78.2 MIXED HYPERLIPIDEMIA: ICD-10-CM

## 2025-02-01 DIAGNOSIS — J30.1 CHRONIC ALLERGIC RHINITIS DUE TO POLLEN: ICD-10-CM

## 2025-02-03 RX ORDER — ROSUVASTATIN CALCIUM 5 MG/1
5 TABLET, COATED ORAL DAILY
Qty: 90 TABLET | Refills: 0 | Status: SHIPPED | OUTPATIENT
Start: 2025-02-03

## 2025-02-03 RX ORDER — MONTELUKAST SODIUM 10 MG/1
10 TABLET ORAL NIGHTLY
Qty: 90 TABLET | Refills: 0 | Status: SHIPPED | OUTPATIENT
Start: 2025-02-03

## 2025-03-14 ENCOUNTER — OFFICE VISIT (OUTPATIENT)
Dept: FAMILY MEDICINE CLINIC | Facility: CLINIC | Age: 70
End: 2025-03-14
Payer: MEDICARE

## 2025-03-14 ENCOUNTER — HOSPITAL ENCOUNTER (OUTPATIENT)
Dept: GENERAL RADIOLOGY | Facility: HOSPITAL | Age: 70
Discharge: HOME | End: 2025-03-14
Payer: MEDICARE

## 2025-03-14 VITALS
HEART RATE: 71 BPM | OXYGEN SATURATION: 96 % | WEIGHT: 232.8 LBS | HEIGHT: 67 IN | TEMPERATURE: 98.9 F | DIASTOLIC BLOOD PRESSURE: 68 MMHG | SYSTOLIC BLOOD PRESSURE: 128 MMHG | BODY MASS INDEX: 36.54 KG/M2 | RESPIRATION RATE: 16 BRPM

## 2025-03-14 DIAGNOSIS — M25.562 BILATERAL CHRONIC KNEE PAIN: ICD-10-CM

## 2025-03-14 DIAGNOSIS — G89.29 BILATERAL CHRONIC KNEE PAIN: ICD-10-CM

## 2025-03-14 DIAGNOSIS — M25.561 BILATERAL CHRONIC KNEE PAIN: ICD-10-CM

## 2025-03-14 DIAGNOSIS — E66.9 OBESITY (BMI 35.0-39.9 WITHOUT COMORBIDITY): ICD-10-CM

## 2025-03-14 DIAGNOSIS — R03.0 ELEVATED BLOOD PRESSURE READING: Primary | ICD-10-CM

## 2025-03-14 PROBLEM — Z12.4 SCREENING FOR MALIGNANT NEOPLASM OF CERVIX: Status: RESOLVED | Noted: 2019-10-14 | Resolved: 2025-03-14

## 2025-03-14 PROBLEM — Z78.0 ASYMPTOMATIC MENOPAUSAL STATE: Status: RESOLVED | Noted: 2019-10-14 | Resolved: 2025-03-14

## 2025-03-14 PROBLEM — Z12.31 ENCOUNTER FOR SCREENING MAMMOGRAM FOR MALIGNANT NEOPLASM OF BREAST: Status: RESOLVED | Noted: 2019-10-14 | Resolved: 2025-03-14

## 2025-03-14 PROBLEM — Z86.79 HISTORY OF ATRIAL FIBRILLATION: Status: ACTIVE | Noted: 2025-03-14

## 2025-03-14 PROBLEM — I48.0 PAROXYSMAL ATRIAL FIBRILLATION: Status: RESOLVED | Noted: 2021-01-08 | Resolved: 2025-03-14

## 2025-03-14 PROBLEM — Z00.00 MEDICARE ANNUAL WELLNESS VISIT, SUBSEQUENT: Status: RESOLVED | Noted: 2019-10-14 | Resolved: 2025-03-14

## 2025-03-14 PROCEDURE — 73562 X-RAY EXAM OF KNEE 3: CPT

## 2025-03-14 PROCEDURE — 99213 OFFICE O/P EST LOW 20 MIN: CPT | Performed by: STUDENT IN AN ORGANIZED HEALTH CARE EDUCATION/TRAINING PROGRAM

## 2025-03-14 NOTE — PATIENT INSTRUCTIONS
For Muscular or joint pains, use:  -  heat/ice, voltaren gel, icy/hot, biofreeze, lidocaine gel, compression sleeve  -  For muscular pain: TENS unit ($20-$30 off [employer]), bengay, gentle stretching

## 2025-03-14 NOTE — PROGRESS NOTES
Subjective   Montserrat Cueva is a 69 y.o. female.   Chief Complaint   Patient presents with    Establish Care     Pt transferring from Dr. Nyla Alcazar    Elevated Blood Pressure       History of Present Illness     Elevated blood pressure  -Current medications  None  -Patient does check blood pressure at home.   -Patient denies chest pain, blurred vision,fatigue, palpitations, shortness of breath or headaches.  -Last two blood pressures:   138/72  2024   158/102 2024   -Blood pressure today: 128/68  -Patient states that her father had  last year and she feels this is why her blood pressure was elevated during those times      Bilateral knee pain:  Started:  1 year ago   Symptoms include left knee worse than right.  Pain soreness to touch and ambulation.  Swelling   Treatment elevation with no improvement  - will take ibuprofen on occasion and this works well      History of Paroxysmal atrial fibrillation  -Patient reported palpitations after she had recovered from COVID.  EKG in PCPs office showed atrial fibrillation with rapid ventricular heart rate.    -Saw cardiology on 2022 who noted she was maintaining sinus rhythm (converted back with sotalol), Patient had not had any recurrence and cardiology would see patient as needed.  Did advise maintaining aspirin 81 mg daily and if atrial fibrillation recurred to call cardiology and he would see the patient      Paroxyxmal atrial fibrillation, saw cardsin  no recurrence, stated would see as needed. Pt statesd this started soon after got covid vaccine        Preventative:  -Offered COVID vaccine, but pt declines  -Pt will contact insurance for coverage of Shingrix and Tdap vaccines   -Offered DEXA Scan, pt prefers this to be done in November with her mammogram      The following portions of the patient's history were reviewed and updated as appropriate: allergies, current medications, past family history, past medical history, past social  history, past surgical history, and problem list.    Patient Active Problem List   Diagnosis    Chronic allergic rhinitis due to pollen    History of malignant neoplasm of female breast    Mixed hyperlipidemia    Class 2 severe obesity due to excess calories with serious comorbidity and body mass index (BMI) of 35.0 to 35.9 in adult    Reactive airways dysfunction syndrome    Primary osteoarthritis of both knees    History of atrial fibrillation       Current Outpatient Medications on File Prior to Visit   Medication Sig Dispense Refill    albuterol sulfate  (90 Base) MCG/ACT inhaler Inhale 2 puffs Every 4 (Four) Hours As Needed for Wheezing or Shortness of Air. 6.7 g 0    Ascorbic Acid (Vitamin C) 500 MG capsule Take  by mouth.      aspirin 81 MG EC tablet Take 1 tablet by mouth Daily.      cholecalciferol (VITAMIN D3) 25 MCG (1000 UT) tablet Take 1 tablet by mouth Daily.      EPINEPHrine (EPIPEN) 0.3 MG/0.3ML solution auto-injector injection Inject 0.3 mL into the appropriate muscle as directed by prescriber As Needed for Allergies.  3    fluticasone (FLONASE) 50 MCG/ACT nasal spray 2 sprays into the nostril(s) as directed by provider Daily. 16 g 12    montelukast (SINGULAIR) 10 MG tablet Take 1 tablet by mouth nightly 90 tablet 0    Multiple Vitamins-Minerals (ZINC PO) Take 1 tablet by mouth Daily.      rosuvastatin (CRESTOR) 5 MG tablet Take 1 tablet by mouth once daily 90 tablet 0    Zinc 50 MG tablet Take  by mouth.      [DISCONTINUED] albuterol sulfate  (90 Base) MCG/ACT inhaler       [DISCONTINUED] Breyna 160-4.5 MCG/ACT inhaler Inhale 2 puffs 2 (Two) Times a Day.       No current facility-administered medications on file prior to visit.     Current outpatient and discharge medications have been reconciled for the patient.  Reviewed by: Ivonne Medina DO      No Known Allergies      Objective   Visit Vitals  /68 (BP Location: Left arm, Patient Position: Sitting, Cuff Size: Large Adult)  "  Pulse 71   Temp 98.9 °F (37.2 °C) (Skin)   Resp 16   Ht 170.2 cm (67.01\")   Wt 106 kg (232 lb 12.8 oz)   LMP 01/01/1985   SpO2 96%   BMI 36.45 kg/m²       Physical Exam  HENT:      Head: Normocephalic and atraumatic.   Eyes:      Conjunctiva/sclera: Conjunctivae normal.   Neurological:      General: No focal deficit present.      Mental Status: She is alert and oriented to person, place, and time.   Psychiatric:         Mood and Affect: Mood normal.         Behavior: Behavior normal.           Diagnoses and all orders for this visit:    1. Elevated blood pressure reading (Primary)  -Resolved.  Patient states that she was emotional after her father had passed away and this is why her blood pressure was elevated    2. Bilateral chronic knee pain  -     XR Knee 3 View Bilateral  -Listed conservative measures by after visit summary.  Patient will try a few of them, did discuss different types of NSAIDs she can try over-the-counter   - offered physical therapy but patient does not feel that she needs to do that just yet      3. Obesity (BMI 35.0-39.9 without comorbidity)  Class 2 Severe Obesity (BMI >=35 and <=39.9). Obesity-related health conditions include the following: dyslipidemias. Obesity is unchanged. BMI is is above average; BMI management plan is completed. We discussed portion control and increasing exercise.             Follow Up  -12/23/2025 for annual wellness exam  -Patient wants to follow-up as needed for her knee pain    Expected course, medications, and adverse effects discussed as appropriate.  Call or return if worsening or persistent symptoms. Hand hygiene was performed before donning protective equipment and after removal when leaving the room.    This document is intended for medical expert use only. Reading of this document by patients and/or patient's family without participating medical staff guidance may result in misinterpretation and unintended morbidity. Any interpretation of such data is " the responsibility of the patient and/or family member responsible for the patient in concert with their primary or specialist providers, not to be left for sources of online searches such as inSelly, [employer] or similar queries. Relying on these approaches to knowledge may result in misinterpretation, misguided goals of care and even death should patients or family members try recommendations outside of the realm of professional medical care.

## 2025-04-21 NOTE — TELEPHONE ENCOUNTER
[facility]    History and Physical - Hospitalist Service       Date of Admission:  4/20/2025    Assessment & Plan      Warren Jaramillo is a 43 year old male with PMH of nonalcoholic liver cirrhosis requiring recurrent paracentesis, SBP, left arm DVT about 2 months ago on Eliquis, and IDDM, COPD, HTN, CHF, LBBB, Rojas's disease, ADHD, fetal alcohol syndrome, autistic disorder,  admitted on 4/20/2025 with:    #Portal hypertension with ascites and recurrent SBP.  #Dyspnea on exertion.  Likely CHF exacerbation.  proBNP elevated at 765.  Most recent echo on 4/3/2025: LVEF 60-65%  Reviewed patient's weight chart, which was texted to him from his group home-today's p.m. weight is up to 287 pounds, historically he has been 281-283.  Previous paracentesis on 4/2/25-4.85 L of cloudy bushra fluid.  CT abdomen-cirrhotic liver morphology small volume ascites.  CXR-no focal lungs.  Disease, stable borderline cardiomegaly.  Diagnostic paracentesis in ER, total nucleated cells 1301.  Empirically treating suspected SBP with ceftriaxone.  Follow ascitic fluid cultures.  US guided therapeutic paracentesis in a.m.  GI consult  PTN p.o. Lasix, will give 40 mg IV Lasix now.  Telemetry     # DM2 (diabetes mellitus, type 2) diet controlled, (H) A1c 6.2.  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  Renal function baseline.  - Monitor trend  - Avoid nephrotoxic meds     #AVA treated with BiPAP 16/20  #COPD right-sided heart failure  -Use home CPAP when available  -Clinically stable respiratory status, does not appear in fluid fluid overload not wheezing     #Hx of DVT of axillary vein left about a month  Eliquis.s in AM.    # Mental health disorders: Depression,ADHD, fetal alcohol syndrome, autistic disorder  -Continue PTA Zyprexa Prozac  Patient is calling stating she is concerned about her blood pressure being high & she gets real flush. Readings have been as follows:  Monday /84, /87  Tuesday /66, /69  Wednesday /81, /69.  Patient states she has cut out caffeine completely thinking that would help. She says it may all be due to recently losing her father. She questions if she needs to be seen or if she should be started on a blood pressure medication. States years ago Dr. Alcazar discussed a possible need for blood pressure medicine. Patient is aware Dr. Alcazar is out of the office today & no appts available. She verified no chest pain or arm pain or numbness. Please advise.    "gabapentin    Diet:  Low-sodium  DVT Prophylaxis: DOAC  Khan Catheter: Not present  Lines: None     Cardiac Monitoring: None  Code Status:  Full    Clinically Significant Risk Factors Present on Admission        # Drug Induced Coagulation Defect: home medication list includes an anticoagulant medication    # Hypertension: Noted on problem list  # Chronic heart failure with preserved ejection fraction: heart failure noted on problem list and last echo with EF >50%    # Severe Obesity: Estimated body mass index is 48.62 kg/m  as calculated from the following:    Height as of this encounter: 1.651 m (5' 5\").    Weight as of this encounter: 132.5 kg (292 lb 3.2 oz).       # Financial/Environmental Concerns:        Disposition Plan     Medically Ready for Discharge: Anticipated Tomorrow    Marybeth Camargo MD  Hospitalist Service  [facility]  Securely message with [employer] (more info)  Text page via [facility] Paging/Directory   _____________________________________________________________________    Chief Complaint   Dyspnea, increased abdominal girth    History is obtained from the patient, electronic health record, and emergency department physician    History of Present Illness   Warren Jaramillo is a 43 year old male with PMH of nonalcoholic liver cirrhosis requiring recurrent paracentesis, SBP, left arm DVT about 2 months ago on Eliquis, and IDDM, COPD, HTN, CHF, LBBB, Rojas's disease, ADHD, fetal alcohol syndrome, autistic disorder, presented to ED for evaluation of exertional dyspnea with abdominal girth.  Patient believes he gained near 5 pounds in 1-2 days.  His group home texted his weight, which showed today's weight of 287 pounds, yesterday was 282.  Patient denies associated chest pain, cardiac palpitations, nausea, vomiting, leg edema.  He was recently on April 4-5 hospitalized for tachypnea secondary to choking and hypoxia, NSTEMI.  Past Medical History    Past Medical History: "   Diagnosis Date    COPD exacerbation (H) 12/02/2024    DM2 (diabetes mellitus, type 2) (H) 04/28/2020    HTN (hypertension) 07/30/2012    Sleep apnea     Thyroid nodule 07/31/2019    Rojas's disease (H)      Past Surgical History   Past Surgical History:   Procedure Laterality Date    COLONOSCOPY      ESOPHAGOSCOPY, GASTROSCOPY, DUODENOSCOPY (EGD), COMBINED N/A 7/21/2023    Procedure: ESOPHAGOGASTRODUODENOSCOPY WITH GASTRIC AND ESOPHAGEAL BIOPSIES;  Surgeon: Filiberto Aragon MD;  Location: [facility] [place] OR    ESOPHAGOSCOPY, GASTROSCOPY, DUODENOSCOPY (EGD), COMBINED N/A 4/4/2025    Procedure: ESOPHAGOGASTRODUODENOSCOPY;  Surgeon: Ramesh Talbot MD;  Location: [facility] [place] OR    TOOTH EXTRACTION       Prior to Admission Medications   Prior to Admission Medications   Prescriptions Last Dose Informant Patient Reported? Taking?   Benzocaine (SOLARCAINE ALOE VERA EX)  Care Giver Yes No   Sig: Externally apply topically daily as needed.   Calamine external lotion  Care Giver Yes No   Sig: Apply topically as needed for itching   Continuous Glucose Sensor (FREESTYLE MEGHANN 3 PLUS SENSOR) MISC  Care Giver Yes No   Sig: USE TO READ BLOOD SUGAR TWICE DAILY AND AS NEEDED. CHANGE SENSOR EVERY 15 DAYS   EPINEPHrine (ANY BX GENERIC EQUIV) 0.3 MG/0.3ML injection 2-pack  Care Giver Yes No   Sig: Inject 0.3 mg into the muscle as needed for anaphylaxis. May repeat one time in 5-15 minutes if response to initial dose is inadequate.   FLUoxetine 20 MG tablet  Care Giver Yes No   Sig: Take 20 mg by mouth every morning.   GAVILAX 17 GM/SCOOP powder  Care Giver Yes No   Sig: Take 17 g by mouth daily as needed.   Hydrocortisone (PREPARATION H EX)  Care Giver Yes No   Sig: Externally apply topically daily as needed.   JARDIANCE 10 MG TABS tablet  Care Giver Yes No   Sig: Take 10 mg by mouth every morning.   OLANZapine (ZYPREXA) 10 MG tablet  Care Giver Yes No   Sig: Take 10 mg by mouth At Bedtime   TRELEGY ELLIPTA 100-62.5-25 MCG/ACT  oral inhaler  Care Giver Yes No   Sig: Inhale 1 puff into the lungs every morning.   Urea 40 % CREA  Care Giver Yes No   Sig: Apply topically daily as needed.   Vitamins A & D (VITAMIN A & D) OINT  Care Giver Yes No   Sig: Externally apply topically daily as needed.   albuterol (PROAIR HFA/PROVENTIL HFA/VENTOLIN HFA) 108 (90 Base) MCG/ACT inhaler  Care Giver No No   Sig: Inhale 1-2 puffs into the lungs every 6 hours as needed for shortness of breath, wheezing or cough   albuterol (PROVENTIL) (2.5 MG/3ML) 0.083% neb solution  Care Giver Yes No   Sig: Take 2.5 mg by nebulization 3 times daily as needed for shortness of breath, wheezing or cough   aloe vera GEL  Care Giver Yes No   Sig: Apply 1 g topically every hour as needed for skin care Per bottle directions   apixaban ANTICOAGULANT (ELIQUIS) 5 MG tablet  Care Giver Yes No   Sig: Take 5 mg by mouth 2 times daily.   bacitracin 500 UNIT/GM OINT  Care Giver Yes No   Sig: Apply topically 3 times daily as needed for wound care   benzonatate (TESSALON) 200 MG capsule  Care Giver No No   Sig: Take 1 capsule (200 mg) by mouth 3 times daily as needed for cough.   carbamide peroxide (DEBROX) 6.5 % otic solution  Care Giver Yes No   Sig: Place 5 drops into both ears daily as needed for other.   clotrimazole (LOTRIMIN) 1 % external cream  Care Giver Yes No   Sig: Apply topically 2 times daily as needed (skin irritation)   dextromethorphan-guaiFENesin (MUCINEX DM)  MG 12 hr tablet  Care Giver Yes No   Sig: Take 1 tablet by mouth 2 times daily as needed.   diclofenac (VOLTAREN) 1 % topical gel  Care Giver Yes No   Sig: Apply 2 g topically daily as needed for moderate pain To joints/back   dicyclomine (BENTYL) 20 MG tablet  Care Giver No No   Sig: Take 1 tablet (20 mg) by mouth 4 times daily as needed (abdominal pain).   famotidine (PEPCID) 20 MG tablet  Care Giver No No   Sig: Take 1 tablet (20 mg) by mouth 2 times daily   furosemide (LASIX) 40 MG tablet  Care Giver No No    Sig: Take 1 tablet (40 mg) by mouth daily.   hydrocortisone (CORTAID) 1 % external cream  Care Giver Yes No   Sig: Apply topically daily as needed.   levothyroxine (SYNTHROID/LEVOTHROID) 25 MCG tablet  Care Giver No No   Sig: Take 0.25 tablets (6.25 mcg) by mouth every morning (before breakfast).   lisinopril (ZESTRIL) 10 MG tablet  Care Giver Yes No   Sig: Take 10 mg by mouth every morning.   loperamide (IMODIUM) 2 MG capsule  Care Giver Yes No   Sig: Take 4 mg by mouth 4 times daily as needed for diarrhea.   loratadine (CLARITIN) 10 MG tablet  Care Giver Yes No   Sig: Take 10 mg by mouth daily as needed for allergies.   magnesium hydroxide (MILK OF MAGNESIA) 400 MG/5ML suspension  Care Giver Yes No   Sig: Take 30 mLs by mouth daily as needed.   metFORMIN (GLUCOPHAGE) 1000 MG tablet  Care Giver Yes No   Sig: Take 1,000 mg by mouth 2 times daily (with meals)   methocarbamol (ROBAXIN) 500 MG tablet  Care Giver No No   Sig: Take 1 tablet (500 mg) by mouth 4 times daily as needed for muscle spasms.   montelukast (SINGULAIR) 10 MG tablet  Care Giver Yes No   Sig: Take 10 mg by mouth every morning.   nicotine (NICODERM CQ) 21 MG/24HR 24 hr patch  Care Giver No No   Sig: Place 1 patch over 24 hours onto the skin daily.   omeprazole (PRILOSEC) 40 MG DR capsule  Care Giver Yes No   Sig: Take 40 mg by mouth every morning.   ondansetron (ZOFRAN ODT) 4 MG ODT tab  Care Giver No No   Sig: Take 1 tablet (4 mg) by mouth every 8 hours as needed for nausea.   pramoxine-calamine (AVEENO) 1-8 % LOTN  Care Giver Yes No   Sig: Apply topically daily as needed for itching.   rosuvastatin (CRESTOR) 10 MG tablet  Care Giver Yes No   Sig: Take 10 mg by mouth At Bedtime   spironolactone (ALDACTONE) 100 MG tablet  Care Giver No No   Sig: Take 1 tablet (100 mg) by mouth daily.   sucralfate (CARAFATE) 1 GM/10ML suspension  Care Giver Yes No   Sig: Take 10 mLs (1 g) by mouth 4 times daily as needed for nausea (heartburn).    sulfamethoxazole-trimethoprim (BACTRIM DS) 800-160 MG tablet  Care Giver Yes No   Sig: Take 1 tablet by mouth once.   traZODone (DESYREL) 100 MG tablet  Care Giver Yes No   Sig: Take 100 mg by mouth at bedtime.   witch hazel-glycerin (TUCKS) pad  Care Giver Yes No   Sig: Apply topically as needed for hemorrhoids.      Facility-Administered Medications: None     Review of Systems    The 10 point Review of Systems is negative other than noted in the HPI.    Social History   I have reviewed this patient's social history and updated it with pertinent information if needed.  Social History     Tobacco Use    Smoking status: Every Day     Current packs/day: 1.00     Average packs/day: 1 pack/day for 21.3 years (21.3 ttl pk-yrs)     Types: Cigarettes     Start date: 1/1/2004    Smokeless tobacco: Never   Vaping Use    Vaping status: Never Used   Substance Use Topics    Alcohol use: Not Currently     Comment: Last 8/7/2024     Family History   I have reviewed this patient's family history and updated it with pertinent information if needed.  Family History   Problem Relation Age of Onset    Unknown/Adopted Father     Unknown/Adopted Maternal Grandmother     C.A.D. Maternal Grandfather     Diabetes Maternal Grandfather     Cerebrovascular Disease Maternal Grandfather     Unknown/Adopted Paternal Grandmother     Unknown/Adopted Paternal Grandfather     Unknown/Adopted Brother     Unknown/Adopted Sister      Allergies   Allergies   Allergen Reactions    Apricot Flavoring Agent (Non-Screening) Anaphylaxis    Banana Anaphylaxis     Throat swelling      Bees Anaphylaxis     Has an Epi pen    Wasp Venom Protein Shortness Of Breath     Other reaction(s): Respiratory Distress  Has an Epi pen        Methylphenidate Itching     Other reaction(s): Nightmares    Prunus      Other reaction(s): *Unknown    Sulfa Antibiotics      Headaches and nausea     Physical Exam   Vital Signs: Temp: 98.8  F (37.1  C) Temp src: Oral BP: (!) 140/72  Pulse: 84   Resp: 20 SpO2: 97 % O2 Device: None (Room air)    Weight: 292 lbs 3.2 oz  General: Alert and oriented x 3. Not in obvious distress.  HEENT: NC, AT. Neck- supple, No JVP elevation, lymphadenopathy or thyromegaly. Trachea-central.  Chest: Clear to auscultation bilaterally.  Heart: S1S2 regular. No M/R/G.  Abdomen: Obese, soft, nontender, nondistended, audible bowel sounds.  Back: No spine tenderness. No CVA tenderness.  Extremities: No leg swelling. Peripheral pulses 2+ bilaterally.  Neuro: Cranial nerves 1-12 grossly normal. No focal neurological deficit    Medical Decision Making       76 MINUTES SPENT BY ME on the date of service doing chart review, history, exam, documentation & further activities per the note.      Data     I have personally reviewed the following data over the past 24 hrs:    12.6 (H)  \   12.8 (L)   / 300     138 101 18.2 /  134 (H)   4.1 23 1.22 (H) \     ALT: 22 AST: 18 AP: 218 (H) TBILI: 0.3   ALB: 4.2 TOT PROTEIN: 7.2 LIPASE: 27     Trop: N/A BNP: 765 (H)       Imaging results reviewed over the past 24 hrs:   Recent Results (from the past 24 hours)   CT Abdomen Pelvis w Contrast    Narrative    EXAM: CT ABDOMEN PELVIS W CONTRAST  LOCATION: [facility]  DATE: 4/20/2025  INDICATION: Abdominal distention and pain  COMPARISON: CTA chest, abdomen and pelvis 4/2/2025  TECHNIQUE: CT scan of the abdomen and pelvis was performed following injection of IV contrast. Multiplanar reformats were obtained. Dose reduction techniques were used.  CONTRAST: [id number] 90ml  FINDINGS:   LOWER CHEST: Normal.  HEPATOBILIARY: Unchanged steatosis and morphologic changes suggesting cirrhosis. No focal lesion. Gallbladder is contracted.  PANCREAS: Unremarkable.  SPLEEN: Nonenlarged.  ADRENAL GLANDS: Unchanged benign bilateral adrenal myelolipomas.  KIDNEYS/BLADDER: The kidneys enhance symmetrically. No urolithiasis or hydronephrosis. Urinary bladder is unremarkable.  BOWEL: No  bowel obstruction. Normal appendix. Small volume ascites, similar to 4/2/2025. No peritoneal thickening. No pneumatosis or pneumoperitoneum.  LYMPH NODES: Scattered prominent retroperitoneal lymph nodes are unchanged and likely reactive.  VASCULATURE: The abdominal aorta is nonaneurysmal.  PELVIC ORGANS: Unremarkable.  MUSCULOSKELETAL: No acute osseous abnormality.      Impression    IMPRESSION:   1.  No acute abnormality in the abdomen or pelvis.  2.  Unchanged cirrhotic liver morphology and findings suggesting portal hypertension with small volume ascites.   Chest XR,  PA & LAT    Narrative    EXAM: XR CHEST 2 VIEWS  LOCATION: [facility]  DATE: 4/20/2025  INDICATION: Shortness of breath  COMPARISON: 3/21/2025      Impression    IMPRESSION:   No focal airspace disease. No pleural effusion or pneumothorax.  Stable borderline cardiomegaly.  Multilevel degenerative changes of the spine.   This document is created with the help of Dragon dictation system. All grammatical errors are unintentional.

## 2025-04-23 DIAGNOSIS — J30.1 CHRONIC ALLERGIC RHINITIS DUE TO POLLEN: ICD-10-CM

## 2025-04-24 RX ORDER — FLUTICASONE PROPIONATE 50 MCG
2 SPRAY, SUSPENSION (ML) NASAL DAILY
Qty: 16 G | Refills: 3 | Status: SHIPPED | OUTPATIENT
Start: 2025-04-24

## 2025-05-12 DIAGNOSIS — E78.2 MIXED HYPERLIPIDEMIA: ICD-10-CM

## 2025-05-12 DIAGNOSIS — J30.1 CHRONIC ALLERGIC RHINITIS DUE TO POLLEN: ICD-10-CM

## 2025-05-13 RX ORDER — ROSUVASTATIN CALCIUM 5 MG/1
5 TABLET, COATED ORAL DAILY
Qty: 90 TABLET | Refills: 3 | Status: SHIPPED | OUTPATIENT
Start: 2025-05-13

## 2025-05-13 RX ORDER — MONTELUKAST SODIUM 10 MG/1
10 TABLET ORAL NIGHTLY
Qty: 90 TABLET | Refills: 3 | Status: SHIPPED | OUTPATIENT
Start: 2025-05-13